# Patient Record
Sex: FEMALE | Race: BLACK OR AFRICAN AMERICAN | NOT HISPANIC OR LATINO | Employment: FULL TIME | URBAN - METROPOLITAN AREA
[De-identification: names, ages, dates, MRNs, and addresses within clinical notes are randomized per-mention and may not be internally consistent; named-entity substitution may affect disease eponyms.]

---

## 2017-03-10 ENCOUNTER — TELEPHONE (OUTPATIENT)
Dept: INTERNAL MEDICINE | Facility: CLINIC | Age: 27
End: 2017-03-10

## 2017-03-10 ENCOUNTER — TELEPHONE (OUTPATIENT)
Dept: OBGYN | Facility: CLINIC | Age: 27
End: 2017-03-10

## 2017-03-10 DIAGNOSIS — N63.0 LUMP OR MASS IN BREAST: Primary | ICD-10-CM

## 2017-03-10 NOTE — TELEPHONE ENCOUNTER
Pt calling in as she has an increased size of lump on left breast.  In 2015 this same lump was looked at, she didn't seem to know the results    She's wondering her next steps.  To have an biopsy ?  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  Biopsy up to the Radiologist.    Pt has not been seen since 5/9/2016 by Dr. Cali.  When discussing whom to see, she chose Dr. Ivan  I explained that Dr. Ivan may want to see pt prior to witting any orders.    Orders pending  Please advise  Brayan RIOS RN

## 2017-03-15 NOTE — TELEPHONE ENCOUNTER
Pt called and advised, phone number given so she can call and schedule (she said they did try calling her, but she missed their call).

## 2017-03-16 ENCOUNTER — HOSPITAL ENCOUNTER (OUTPATIENT)
Dept: ULTRASOUND IMAGING | Facility: CLINIC | Age: 27
Discharge: HOME | End: 2017-03-16
Attending: INTERNAL MEDICINE | Admitting: INTERNAL MEDICINE
Payer: COMMERCIAL

## 2017-03-16 DIAGNOSIS — N63.0 LUMP OR MASS IN BREAST: ICD-10-CM

## 2017-03-16 PROCEDURE — 76642 ULTRASOUND BREAST LIMITED: CPT | Mod: LT

## 2017-03-21 ENCOUNTER — HOSPITAL ENCOUNTER (OUTPATIENT)
Dept: ULTRASOUND IMAGING | Facility: CLINIC | Age: 27
Discharge: HOME | End: 2017-03-21
Attending: INTERNAL MEDICINE | Admitting: INTERNAL MEDICINE
Payer: COMMERCIAL

## 2017-03-21 DIAGNOSIS — N63.0 BREAST MASS: ICD-10-CM

## 2017-03-21 PROCEDURE — 27210206 US BREAST BIOPSY CORE NEEDLE LEFT

## 2017-03-21 PROCEDURE — 88305 TISSUE EXAM BY PATHOLOGIST: CPT | Mod: 26 | Performed by: RADIOLOGY

## 2017-03-21 PROCEDURE — 88305 TISSUE EXAM BY PATHOLOGIST: CPT | Performed by: RADIOLOGY

## 2017-03-21 NOTE — DISCHARGE INSTRUCTIONS
Page 1 of 1  For informational purposes only. Not to replace the advice of your health care provider. Copyright   2010 [facility]. All rights reserved. [employer] [id number] - REV 02/16.  After Your Breast Biopsy   Bleeding or bruising  Slight bruising is normal. If you bleed through the bandage, put direct pressure on the breast for 10 minutes.   If the breast begins to swell, or you have a lot of bleeding after 10 minutes of pressure, call the doctor who ordered your exam. Or, go to the emergency room.   Bandages  Keep your bandage in place until tomorrow morning. Do not get it wet.   If you have small pieces of tape on the skin, leave them in place. They will fall off on their own, or you can remove them after 5 days.   Activity  You may shower the morning after the exam. No heavy activity (lifting, vacuuming) on the day of your exam. You may go back to normal activity the next day, unless you had a lot of bleeding or pain.  Discomfort  You may take Tylenol (acetaminophen) today for pain. Tomorrow, you may take an anti-inflammatory medicine (aspirin, ibuprofen, Motrin, Aleve, Advil), unless your doctor tells you not to.  Wear your bra overnight to support the breast. You may also use an ice pack: Place it over the area for 15-20 minutes several times a day.  Infection  Infection is rare. Symptoms include fever, redness, increasing pain and fluid draining from the biopsy site. If you have any of these symptoms, please call the doctor who ordered your exam.  Results  Results may take up to 5 business days. A nurse or doctor from the [facility] will call with your results. We will also send the results to the doctor who ordered your biopsy.  If you have not heard your results in 5 days, please call the Breast Center.   Other instructions  ______________________________________________________________________________________________________________________________  Call your doctor if:    You have  bleeding that lasts more than 10 minutes.    You have pain that cannot be controlled.   You have signs of infection (fever, redness, drainage or other signs).   You have not received your results within 5 days.    Please call the [facility] Center nurse navigator at [phone] if you have questions or concerns about your biopsy.

## 2017-03-22 ENCOUNTER — TELEPHONE (OUTPATIENT)
Dept: MAMMOGRAPHY | Facility: CLINIC | Age: 27
End: 2017-03-22

## 2017-03-22 LAB — COPATH REPORT: NORMAL

## 2017-03-22 NOTE — TELEPHONE ENCOUNTER
Pathology report reviewed with breast radiologist Felipe. I phoned and informed patient of results showing benign fibroadenoma. Recommended follow up is routine screening at age 40.   Patient states no problems with biopsy site. Questions were answered and my phone number given if she has further questions or concerns.

## 2017-08-02 ENCOUNTER — HOSPITAL ENCOUNTER (EMERGENCY)
Facility: CLINIC | Age: 27
Discharge: HOME | End: 2017-08-02
Attending: EMERGENCY MEDICINE | Admitting: EMERGENCY MEDICINE
Payer: COMMERCIAL

## 2017-08-02 VITALS
HEART RATE: 79 BPM | SYSTOLIC BLOOD PRESSURE: 137 MMHG | DIASTOLIC BLOOD PRESSURE: 95 MMHG | TEMPERATURE: 97.2 F | RESPIRATION RATE: 18 BRPM | OXYGEN SATURATION: 99 %

## 2017-08-02 DIAGNOSIS — J02.9 ACUTE VIRAL PHARYNGITIS: ICD-10-CM

## 2017-08-02 LAB
DEPRECATED S PYO AG THROAT QL EIA: NORMAL
MICRO REPORT STATUS: NORMAL
SPECIMEN SOURCE: NORMAL

## 2017-08-02 PROCEDURE — 87880 STREP A ASSAY W/OPTIC: CPT | Performed by: EMERGENCY MEDICINE

## 2017-08-02 PROCEDURE — 99283 EMERGENCY DEPT VISIT LOW MDM: CPT

## 2017-08-02 PROCEDURE — 87081 CULTURE SCREEN ONLY: CPT | Performed by: EMERGENCY MEDICINE

## 2017-08-02 ASSESSMENT — ENCOUNTER SYMPTOMS
FEVER: 0
SORE THROAT: 1
COUGH: 1

## 2017-08-02 NOTE — ED AVS SNAPSHOT
[facility] Emergency Department    [address] [place] [address]    Phone:  [phone]    Fax:  [phone]                                       Yael Johnson   MRN: [id number]    Department:  [facility] Emergency Department   Date of Visit:  8/2/2017           Patient Information     Date Of Birth          1990        Your diagnoses for this visit were:     Acute viral pharyngitis        You were seen by Arnoldo Payne MD.      Follow-up Information     Follow up with PCP as needed.        Discharge Instructions         Viral Pharyngitis (Sore Throat)    You (or your child, if your child is the patient) have pharyngitis (sore throat). This infection is caused by a virus. It can cause throat pain that is worse when swallowing, aching all over, headache, and fever. The infection may be spread by coughing, kissing, or touching others after touching your mouth or nose. Antibiotic medications do not work against viruses, so they are not used for treating this condition.  Home care    If your symptoms are severe, rest at home. Return to work or school when you feel well enough.     Drink plenty of fluids to avoid dehydration.    For children: Use acetaminophen for fever, fussiness or discomfort. In infants over six months of age, you may use ibuprofen instead of acetaminophen. (NOTE: If your child has chronic liver or kidney disease or ever had a stomach ulcer or GI bleeding, talk with your doctor before using these medicines.) (NOTE: Aspirin should never be used in anyone under 18 years of age who is ill with a fever. It may cause severe liver damage.)     For adults: You may use acetaminophen or ibuprofen to control pain or fever, unless another medicine was prescribed for this. (NOTE: If you have chronic liver or kidney disease or ever had a stomach ulcer or GI bleeding, talk with your doctor before using these medicines.)    Throat lozenges or numbing throat  sprays can help reduce pain. Gargling with warm salt water will also help reduce throat pain. For this, dissolve 1/2 teaspoon of salt in 1 glass of warm water. To help soothe a sore throat, children can sip on juice or a popsicle. Children 5 years and older can also suck on a lollipop or hard candy.    Avoid salty or spicy foods, which can be irritating to the throat.  Follow-up care  Follow up with your healthcare provider or our staff if you are not improving over the next week.  When to seek medical advice  Call your healthcare provider right away if any of these occur:    Fever as directed by your doctor.  For children, seek care if:    Your child is of any age and has repeated fevers above 104 F (40 C).    Your child is younger than 2 years of age and has a fever of 100.4 F (38 C) that continues for more than 1 day.    Your child is 2 years old or older and has a fever of 100.4 F (38 C) that continues for more than 3 days.    New or worsening ear pain, sinus pain, or headache    Painful lumps in the back of neck    Stiff neck    Lymph nodes are getting larger    Inability to swallow liquids, excessive drooling, or inability to open mouth wide due to throat pain    Signs of dehydration (very dark urine or no urine, sunken eyes, dizziness)    Trouble breathing or noisy breathing    Muffled voice    New rash    Child appears to be getting sicker  Date Last Reviewed: 4/13/2015 2000-2017 [employer]. [address] [place] [address]. All rights reserved. This information is not intended as a substitute for professional medical care. Always follow your healthcare professional's instructions.          24 Hour Appointment Hotline       To make an appointment at any [facility] clinic, call [phone] ([phone]). If you don't have a family doctor or clinic, we will help you find one. [facility] clinics are conveniently located to serve the needs of you and your family.             Review  of your medicines      Our records show that you are taking the medicines listed below. If these are incorrect, please call your family doctor or clinic.        Dose / Directions Last dose taken    albuterol (2.5 MG/3ML) 0.083% neb solution   Dose:  1 vial   Quantity:  1 Box        Take 1 vial (2.5 mg) by nebulization every 6 hours as needed for shortness of breath / dyspnea   Refills:  11        fluticasone-salmeterol 100-50 MCG/DOSE diskus inhaler   Commonly known as:  ADVAIR DISKUS   Dose:  1 puff   Quantity:  1 Inhaler        Inhale 1 puff into the lungs every 12 hours   Refills:  0                Procedures and tests performed during your visit     Beta strep group A culture    Rapid strep screen      Orders Needing Specimen Collection     None      Pending Results     Date and Time Order Name Status Description    8/2/2017 0612 Beta strep group A culture In process             Pending Culture Results     Date and Time Order Name Status Description    8/2/2017 0612 Beta strep group A culture In process             Pending Results Instructions     If you had any lab results that were not finalized at the time of your Discharge, you can call the ED Lab Result RN at [phone]. You will be contacted by this team for any positive Lab results or changes in treatment. The nurses are available 7 days a week from 10A to 6:30P.  You can leave a message 24 hours per day and they will return your call.        Test Results From Your Hospital Stay        8/2/2017  6:43 AM      Component Results     Component    Specimen Description    Throat    Rapid Strep A Screen    NEGATIVE: No Group A streptococcal antigen detected by immunoassay, await   culture report.      Micro Report Status    FINAL 08/02/2017 8/2/2017  6:44 AM                Clinical Quality Measure: Blood Pressure Screening     Your blood pressure was checked while you were in the emergency department today. The last reading we obtained was  BP: (!)  "137/95 . Please read the guidelines below about what these numbers mean and what you should do about them.  If your systolic blood pressure (the top number) is less than 120 and your diastolic blood pressure (the bottom number) is less than 80, then your blood pressure is normal. There is nothing more that you need to do about it.  If your systolic blood pressure (the top number) is 120-139 or your diastolic blood pressure (the bottom number) is 80-89, your blood pressure may be higher than it should be. You should have your blood pressure rechecked within a year by a primary care provider.  If your systolic blood pressure (the top number) is 140 or greater or your diastolic blood pressure (the bottom number) is 90 or greater, you may have high blood pressure. High blood pressure is treatable, but if left untreated over time it can put you at risk for heart attack, stroke, or kidney failure. You should have your blood pressure rechecked by a primary care provider within the next 4 weeks.  If your provider in the emergency department today gave you specific instructions to follow-up with your doctor or provider even sooner than that, you should follow that instruction and not wait for up to 4 weeks for your follow-up visit.        Thank you for choosing [facility]       Thank you for choosing [facility] for your care. Our goal is always to provide you with excellent care. Hearing back from our patients is one way we can continue to improve our services. Please take a few minutes to complete the written survey that you may receive in the mail after you visit with us. Thank you!        [employer] Information     [employer] lets you send messages to your doctor, view your test results, renew your prescriptions, schedule appointments and more. To sign up, go to [email or web address] . Click on \"Log in\" on the left side of the screen, which will take you to the Welcome page. Then click on \"Sign up Now\" on the right side of the " page.     You will be asked to enter the access code listed below, as well as some personal information. Please follow the directions to create your username and password.     Your access code is: [email or web address]  Expires: 10/31/2017  6:45 AM     Your access code will  in 90 days. If you need help or a new code, please call your [facility] clinic or [phone].        Care EveryWhere ID     This is your Care EveryWhere ID. This could be used by other organizations to access your [facility] medical records  [id number]        Equal Access to Services     [facility] : Anshu Kline, jermaine goodson, tremaine shepard, demetra lucio . So [facility] [phone].    ATENCIÓN: Si habla español, tiene a moran disposición servicios gratuitos de asistencia lingüística. Llame al [phone].    We comply with applicable federal civil rights laws and [place] laws. We do not discriminate on the basis of race, color, national origin, age, disability sex, sexual orientation or gender identity.            After Visit Summary       This is your record. Keep this with you and show to your community pharmacist(s) and doctor(s) at your next visit.

## 2017-08-02 NOTE — ED AVS SNAPSHOT
[facility] Emergency Department    [address]    [facility] [address]    Phone:  [phone]    Fax:  [phone]                                       Yael Johnson   MRN: [id number]    Department:  [facility] Emergency Department   Date of Visit:  8/2/2017           After Visit Summary Signature Page     I have received my discharge instructions, and my questions have been answered. I have discussed any challenges I see with this plan with the nurse or doctor.    ..........................................................................................................................................  Patient/Patient Representative Signature      ..........................................................................................................................................  Patient Representative Print Name and Relationship to Patient    ..................................................               ................................................  Date                                            Time    ..........................................................................................................................................  Reviewed by Signature/Title    ...................................................              ..............................................  Date                                                            Time

## 2017-08-02 NOTE — LETTER
To Whom it may concern:      Yael Johnson was seen in our Emergency Department today, 08/02/17.  I expect her condition to improve over the next  day.  she may return to work/school when improved.    Sincerely,  Wendi Perry RN

## 2017-08-02 NOTE — ED PROVIDER NOTES
History     Chief Complaint:  Sore throat    HPI   Yael Johnson is a 26 year old female with history of asthma, who presents with sore throat. She woke up with a sore throat and odynophagia, which improved after taking a shower. It is now 5/10 in intensity. No otalgia or subjective fever. She reports having cough and sneezing over the past few days. She has not taken any ibuprofen / tylenol.    Allergies:  The patient has no known drug allergies.      Medications:    Advair Diskus  Albuterol neb    Past Medical History:    Moderate asthma  Tonsil hypertrophy     Past Surgical History:    History reviewed.  No significant past surgical history.      Family History:    Lung disease  HTN  DM type 2  Arthritis     Social History:  Marital Status:  Single   Smoking status: negative   Alcohol status: occasional   Patient presents alone.     Review of Systems   Constitutional: Negative for fever.   HENT: Positive for sore throat. Negative for ear pain.    Respiratory: Positive for cough.    All other systems reviewed and are negative.      Physical Exam   First Vitals:  BP: 137/95  Heart Rate: 79  Temp: 97.2  F (36.2  C)  Resp: 18  SpO2: 99 %      Physical Exam  Nursing note and vitals reviewed.  Constitutional: Cooperative. Sitting up comfortably in a chair.   HENT:   Mouth/Throat: Mucous membranes are normal. Mild erythema to the posterior oropharynx. No abscess or exudate. TM'S normal bilaterally.  Cardiovascular: Normal rate, regular rhythm and normal heart sounds.  No murmur.  Pulmonary/Chest: Effort normal and breath sounds normal. No respiratory distress. No wheezes. No rales.   Neurological: Alert.   Skin: Skin is warm and dry.  Psychiatric: Normal mood and affect.      Emergency Department Course     Laboratory:  Laboratory findings were communicated with the patient who voiced understanding of the findings.    Rapid strep screen: Negative  Beta strep group A culture: Pending     Emergency Department  Course:  Nursing notes and vitals reviewed.  I performed an exam of the patient as documented above.     Swab sample obtained for strep testing, results above.     I discussed the findings and treatment plan with the patient. She expressed understanding of this plan and consented to discharge. She will be discharged home with instructions for care and follow up. In addition, the patient will return to the emergency department if their symptoms persist, worsen, if new symptoms arise or if there is any concern.  All questions were answered.     Impression & Plan      Medical Decision Making:  Yael Johnson is a 26 year old female who presents for evaluation of a sore throat and clinical evidence of pharyngitis.  The rapid strep test is negative, and formal culture has been set up in the laboratory. There is no clinical evidence of peritonsillar abscess, retropharyngeal abscess, Lemierre's Syndrome, epiglottis, or Misael's angina. The etiology is most likely viral. I have recommended treatment with analgesics, and we will await formal culture results.  If the culture is positive, an ED physician will call the patient to initiate anti-microbial therapy. Return if increasing pain, change in voice, neck pain, vomiting, fever, or shortness of breath. Follow-up with primary physician if not improving in 3-5 days. Given well appearance, I would not test further for other etiologies of serious bacterial infections.   Patient has a concurrent cough and congestion, making viral etiologies more likely.      Diagnosis:    ICD-10-CM   1. Acute viral pharyngitis J02.8    B97.89       Disposition:   Discharge     Scribe Disclosure:  Cathy MARC, am serving as a scribe at 6:27 AM on 8/2/2017 to document services personally performed by Arnoldo Payne MD, based on my observations and the provider's statements to me.    [facility] EMERGENCY DEPARTMENT       Arnoldo Payne MD  08/02/17 0623

## 2017-08-02 NOTE — DISCHARGE INSTRUCTIONS
Viral Pharyngitis (Sore Throat)    You (or your child, if your child is the patient) have pharyngitis (sore throat). This infection is caused by a virus. It can cause throat pain that is worse when swallowing, aching all over, headache, and fever. The infection may be spread by coughing, kissing, or touching others after touching your mouth or nose. Antibiotic medications do not work against viruses, so they are not used for treating this condition.  Home care    If your symptoms are severe, rest at home. Return to work or school when you feel well enough.     Drink plenty of fluids to avoid dehydration.    For children: Use acetaminophen for fever, fussiness or discomfort. In infants over six months of age, you may use ibuprofen instead of acetaminophen. (NOTE: If your child has chronic liver or kidney disease or ever had a stomach ulcer or GI bleeding, talk with your doctor before using these medicines.) (NOTE: Aspirin should never be used in anyone under 18 years of age who is ill with a fever. It may cause severe liver damage.)     For adults: You may use acetaminophen or ibuprofen to control pain or fever, unless another medicine was prescribed for this. (NOTE: If you have chronic liver or kidney disease or ever had a stomach ulcer or GI bleeding, talk with your doctor before using these medicines.)    Throat lozenges or numbing throat sprays can help reduce pain. Gargling with warm salt water will also help reduce throat pain. For this, dissolve 1/2 teaspoon of salt in 1 glass of warm water. To help soothe a sore throat, children can sip on juice or a popsicle. Children 5 years and older can also suck on a lollipop or hard candy.    Avoid salty or spicy foods, which can be irritating to the throat.  Follow-up care  Follow up with your healthcare provider or our staff if you are not improving over the next week.  When to seek medical advice  Call your healthcare provider right away if any of these  occur:    Fever as directed by your doctor.  For children, seek care if:    Your child is of any age and has repeated fevers above 104 F (40 C).    Your child is younger than 2 years of age and has a fever of 100.4 F (38 C) that continues for more than 1 day.    Your child is 2 years old or older and has a fever of 100.4 F (38 C) that continues for more than 3 days.    New or worsening ear pain, sinus pain, or headache    Painful lumps in the back of neck    Stiff neck    Lymph nodes are getting larger    Inability to swallow liquids, excessive drooling, or inability to open mouth wide due to throat pain    Signs of dehydration (very dark urine or no urine, sunken eyes, dizziness)    Trouble breathing or noisy breathing    Muffled voice    New rash    Child appears to be getting sicker  Date Last Reviewed: 4/13/2015 2000-2017 [employer]. [address] [place] [address], [place] [address]. All rights reserved. This information is not intended as a substitute for professional medical care. Always follow your healthcare professional's instructions.

## 2017-08-04 LAB
BACTERIA SPEC CULT: NORMAL
Lab: NORMAL
MICRO REPORT STATUS: NORMAL
SPECIMEN SOURCE: NORMAL

## 2017-10-16 ENCOUNTER — TELEPHONE (OUTPATIENT)
Dept: INTERNAL MEDICINE | Facility: CLINIC | Age: 27
End: 2017-10-16

## 2017-10-16 NOTE — TELEPHONE ENCOUNTER
Patient calls requesting an appointment fro enlarged tonsils, wants them removed. Advised appointment, scheduled at 1:20pm 10/19/17 which was earliest pt could work with her schedule.

## 2017-11-09 ENCOUNTER — TELEPHONE (OUTPATIENT)
Dept: INTERNAL MEDICINE | Facility: CLINIC | Age: 27
End: 2017-11-09

## 2017-11-09 DIAGNOSIS — J45.40 MODERATE PERSISTENT ASTHMA WITHOUT COMPLICATION: ICD-10-CM

## 2017-11-09 NOTE — TELEPHONE ENCOUNTER
Yael Johnson is a 27 year old female who calls with difficulty breathing.    NURSING ASSESSMENT:  Description:  Difficulty breathing  Onset/duration:  About 1 week  Precip. factors:  Out of Advair for about 10 days, needs refill, but no longer as a primary at our office (former pt of Dr. Cali)  Associated symptoms:  Wheezing, coughing  Improves/worsens symptoms:  Albuterol neb  Last exam/Treatment:  5/9/16  Allergies:   Allergies   Allergen Reactions     Cats      Dogs        MEDICATIONS:   Taking medication(s) as prescribed? No and ran out of Advair  Taking over the counter medication(s?) N/A  Any medication side effects? No significant side effects    Any barriers to taking medication(s) as prescribed?  No  Medication(s) improving/managing symptoms?  Yes  Medication reconciliation completed: No      NURSING PLAN: Nursing advice to patient will refill Advair, schedule appt to establish care with a new provider    RECOMMENDED DISPOSITION:  See within 2 weeks - appt scheduled with Anastacia Santana.   Next 5 appointments (look out 90 days)     Nov 14, 2017  2:00 PM CST   SHORT with Anastacia Santana NP   [facility] ([facility])    [address] [place] [id number] [phone]                 Will comply with recommendation: Yes  If further questions/concerns or if symptoms do not improve, worsen or new symptoms develop, call your PCP or [facility] as soon as possible.      Guideline used:  Telephone Triage Protocols for Nurses, Fourth Edition, Eloina Mccullough    Medication is being filled for 1 time refill only due to:  Patient needs to be seen because it has been more than one year since last visit. Future appt scheduled.     Ida Garner RN

## 2017-12-14 DIAGNOSIS — J45.40 MODERATE PERSISTENT ASTHMA WITHOUT COMPLICATION: ICD-10-CM

## 2017-12-18 NOTE — TELEPHONE ENCOUNTER
Requested Prescriptions   Pending Prescriptions Disp Refills     ADVAIR DISKUS 100-50 MCG/DOSE diskus inhaler [Pharmacy Med Name: ADVAIR 100-50 DISKUS]  0     Sig: INHALE 1 PUFF INTO THE LUNGS EVERY 12 HOURS    Inhaled Steroids Protocol Failed    12/14/2017  9:43 AM       Failed - Asthma control test 20 or greater in last 6 months    Please review ACT score.          Failed - Recent (6 mo) or future visit with authorizing provider's specialty    Patient had office visit in the last 6 months or has a visit in the next 30 days with authorizing provider.  See chart review.            Passed - Patient is age 12 or older        Routing refill request to provider for review/approval because:  Failed protocol, Patient informed 11/9/17 needed OV and F/u

## 2017-12-18 NOTE — TELEPHONE ENCOUNTER
[facility] left voice message checking on status of refill request.     Former patient of Dr. Cali' - last appt 5/9/16. She was given one time refill in Nov to last until appt with Anastacia Santana. Pt has no-showed last two appts in our office.     Left voice message for pharmacy that pt is due for office visit, needs to be seen for refills.     Attempted to contact pt. Left voice message to call back.

## 2018-05-03 ENCOUNTER — OFFICE VISIT (OUTPATIENT)
Dept: FAMILY MEDICINE | Facility: CLINIC | Age: 28
End: 2018-05-03
Payer: COMMERCIAL

## 2018-05-03 VITALS
OXYGEN SATURATION: 98 % | RESPIRATION RATE: 20 BRPM | TEMPERATURE: 96.9 F | SYSTOLIC BLOOD PRESSURE: 121 MMHG | HEART RATE: 86 BPM | DIASTOLIC BLOOD PRESSURE: 81 MMHG

## 2018-05-03 DIAGNOSIS — J45.40 MODERATE PERSISTENT ASTHMA WITHOUT COMPLICATION: ICD-10-CM

## 2018-05-03 DIAGNOSIS — Z23 NEED FOR VACCINATION: ICD-10-CM

## 2018-05-03 DIAGNOSIS — N76.0 BV (BACTERIAL VAGINOSIS): Primary | ICD-10-CM

## 2018-05-03 DIAGNOSIS — N89.8 VAGINAL DISCHARGE: ICD-10-CM

## 2018-05-03 DIAGNOSIS — B96.89 BV (BACTERIAL VAGINOSIS): Primary | ICD-10-CM

## 2018-05-03 LAB
SPECIMEN SOURCE: ABNORMAL
WET PREP SPEC: ABNORMAL
WET PREP SPEC: ABNORMAL

## 2018-05-03 PROCEDURE — 90471 IMMUNIZATION ADMIN: CPT | Performed by: PHYSICIAN ASSISTANT

## 2018-05-03 PROCEDURE — 90715 TDAP VACCINE 7 YRS/> IM: CPT | Performed by: PHYSICIAN ASSISTANT

## 2018-05-03 PROCEDURE — 99214 OFFICE O/P EST MOD 30 MIN: CPT | Mod: 25 | Performed by: PHYSICIAN ASSISTANT

## 2018-05-03 PROCEDURE — 87210 SMEAR WET MOUNT SALINE/INK: CPT | Performed by: PHYSICIAN ASSISTANT

## 2018-05-03 RX ORDER — METRONIDAZOLE 500 MG/1
500 TABLET ORAL 2 TIMES DAILY
Qty: 14 TABLET | Refills: 0 | Status: SHIPPED | OUTPATIENT
Start: 2018-05-03 | End: 2018-05-31

## 2018-05-03 NOTE — PATIENT INSTRUCTIONS
"Discussed importance of safe sex practices and condom use with patient.  Treatment: Flagyl 500 BID x 7 days  ROV prn if symptoms persist or worsen.  Vaginitis Prevention and Self-Care    Wear loose-fitting, cotton clothing. Stay away from nylon and synthetics, because they hold heat and moisture close to the skin, which makes it easier for an infection to start. You may want to remove pajama bottoms or underwear when you sleep.     Avoid sex while vaginal tissues are irritated to allow them to heal.     Consider using a water-based lubricant during sexual activity.    Do not scratch the vaginal area. Relieve itching with a cold water compress or cool baths. Warm baths may also relieve pain and itching.    You may use an over-the-counter hydrocortisone cream for relief of itching.  Do not use for more that 2 weeks at a time.    Do not douche, use scented soaps, sprays or other \"feminine products\" as these can irritate vaginal tissue.    Properly clean the genital area while bathing or showering. Proper wiping after using the toilet can also help (front to back).   Contact your care provider if:    Unexpected vaginal bleeding develops.     A fever develops.     You have moderate to severe pain.     Your symptoms become more severe or frequent.     "

## 2018-05-03 NOTE — PROGRESS NOTES
SUBJECTIVE:   Yael Johnosn is a 27 year old female who presents to clinic today for the following health issues:    Vaginal Symptoms      Duration: 1 week    Description  odor    Intensity:  none    Accompanying signs and symptoms (fever/dysuria/abdominal or back pain): None    History  Sexually active: not at present  Possibility of pregnancy: No  Recent antibiotic use: no     Therapies tried and outcome: none       Patient also wondering about her asthma - history of being on higher dose of her daily Advair with good results - feels like she would benefit from this increase again.      Problem list and histories reviewed & adjusted, as indicated.  Additional history: as documented    Patient Active Problem List   Diagnosis     CARDIOVASCULAR SCREENING; LDL GOAL LESS THAN 160     Family history of diabetes mellitus     Enlarged tonsils     Moderate persistent asthma     Past Surgical History:   Procedure Laterality Date     none         Social History   Substance Use Topics     Smoking status: Never Smoker     Smokeless tobacco: Never Used     Alcohol use Yes      Comment: OCC     Family History   Problem Relation Age of Onset     Hypertension Mother      DIABETES Mother      type 2     Arthritis Mother      DIABETES Maternal Grandmother      type 2     Cardiovascular Paternal Grandfather      Lung         Current Outpatient Prescriptions   Medication Sig Dispense Refill     albuterol (2.5 MG/3ML) 0.083% nebulizer solution Take 1 vial (2.5 mg) by nebulization every 6 hours as needed for shortness of breath / dyspnea 1 Box 11     fluticasone-salmeterol (ADVAIR) 250-50 MCG/DOSE diskus inhaler Inhale 1 puff into the lungs 2 times daily 3 Inhaler 1     metroNIDAZOLE (FLAGYL) 500 MG tablet Take 1 tablet (500 mg) by mouth 2 times daily 14 tablet 0     [DISCONTINUED] ADVAIR DISKUS 100-50 MCG/DOSE diskus inhaler INHALE 1 PUFF INTO THE LUNGS EVERY 12 HOURS 1 Inhaler 11     Allergies   Allergen Reactions     Cats       Dogs        Reviewed and updated as needed this visit by clinical staff  Tobacco  Allergies  Meds  Problems  Med Hx  Surg Hx  Fam Hx  Soc Hx        Reviewed and updated as needed this visit by Provider  Allergies  Meds  Problems         ROS:  Constitutional, HEENT, cardiovascular, pulmonary, GI, , musculoskeletal, neuro, skin, endocrine and psych systems are negative, except as otherwise noted.    OBJECTIVE:     /81 (BP Location: Left arm, Patient Position: Sitting, Cuff Size: Adult Large)  Pulse 86  Temp 96.9  F (36.1  C) (Tympanic)  Resp 20  SpO2 98%  There is no height or weight on file to calculate BMI.  GENERAL: healthy, alert and no distress  RESP: lungs clear to auscultation - no rales, rhonchi or wheezes  CV: regular rate and rhythm, normal S1 S2, no S3 or S4, no murmur, click or rub, no peripheral edema and peripheral pulses strong  : self wet prep done  PSYCH: mentation appears normal, affect normal/bright    Diagnostic Test Results:  Results for orders placed or performed in visit on 05/03/18   Wet prep   Result Value Ref Range    Specimen Description Vagina     Wet Prep Clue cells seen  No Trichomonas seen   (A)     Wet Prep No yeast seen         ASSESSMENT/PLAN:       ICD-10-CM    1. BV (bacterial vaginosis) N76.0 metroNIDAZOLE (FLAGYL) 500 MG tablet    B96.89    2. Vaginal discharge N89.8 Wet prep   3. Moderate persistent asthma without complication J45.40 fluticasone-salmeterol (ADVAIR) 250-50 MCG/DOSE diskus inhaler   4. Need for vaccination Z23 TDAP VACCINE (ADACEL)     ADMIN 1st VACCINE       Patient Instructions   Discussed importance of safe sex practices and condom use with patient.  Treatment: Flagyl 500 BID x 7 days  ROV prn if symptoms persist or worsen.  Vaginitis Prevention and Self-Care    Wear loose-fitting, cotton clothing. Stay away from nylon and synthetics, because they hold heat and moisture close to the skin, which makes it easier for an infection to start.  "You may want to remove pajama bottoms or underwear when you sleep.     Avoid sex while vaginal tissues are irritated to allow them to heal.     Consider using a water-based lubricant during sexual activity.    Do not scratch the vaginal area. Relieve itching with a cold water compress or cool baths. Warm baths may also relieve pain and itching.    You may use an over-the-counter hydrocortisone cream for relief of itching.  Do not use for more that 2 weeks at a time.    Do not douche, use scented soaps, sprays or other \"feminine products\" as these can irritate vaginal tissue.    Properly clean the genital area while bathing or showering. Proper wiping after using the toilet can also help (front to back).   Contact your care provider if:    Unexpected vaginal bleeding develops.     A fever develops.     You have moderate to severe pain.     Your symptoms become more severe or frequent.         Rebecca Jama PA-C  [facility]  "

## 2018-05-03 NOTE — MR AVS SNAPSHOT
"              After Visit Summary   5/3/2018    Yael Johnson    MRN: [id number]           Patient Information     Date Of Birth          1990        Visit Information        Provider Department      5/3/2018 2:20 PM Rebecca Jama PA-C [facility] [place]        Today's Diagnoses     BV (bacterial vaginosis)    -  1    Vaginal discharge        Moderate persistent asthma without complication        Need for vaccination          Care Instructions    Discussed importance of safe sex practices and condom use with patient.  Treatment: Flagyl 500 BID x 7 days  ROV prn if symptoms persist or worsen.  Vaginitis Prevention and Self-Care    Wear loose-fitting, cotton clothing. Stay away from nylon and synthetics, because they hold heat and moisture close to the skin, which makes it easier for an infection to start. You may want to remove pajama bottoms or underwear when you sleep.     Avoid sex while vaginal tissues are irritated to allow them to heal.     Consider using a water-based lubricant during sexual activity.    Do not scratch the vaginal area. Relieve itching with a cold water compress or cool baths. Warm baths may also relieve pain and itching.    You may use an over-the-counter hydrocortisone cream for relief of itching.  Do not use for more that 2 weeks at a time.    Do not douche, use scented soaps, sprays or other \"feminine products\" as these can irritate vaginal tissue.    Properly clean the genital area while bathing or showering. Proper wiping after using the toilet can also help (front to back).   Contact your care provider if:    Unexpected vaginal bleeding develops.     A fever develops.     You have moderate to severe pain.     Your symptoms become more severe or frequent.             Follow-ups after your visit        Follow-up notes from your care team     Return if symptoms worsen or fail to improve.      Who to contact     If you have questions or need follow up information " "about today's clinic visit or your schedule please contact [facility] directly at [phone].  Normal or non-critical lab and imaging results will be communicated to you by [employer], letter or phone within 4 business days after the clinic has received the results. If you do not hear from us within 7 days, please contact the clinic through [employer] or phone. If you have a critical or abnormal lab result, we will notify you by phone as soon as possible.  Submit refill requests through [employer] or call your pharmacy and they will forward the refill request to us. Please allow 3 business days for your refill to be completed.          Additional Information About Your Visit        [employer] Information     [employer] lets you send messages to your doctor, view your test results, renew your prescriptions, schedule appointments and more. To sign up, go to [email or web address] . Click on \"Log in\" on the left side of the screen, which will take you to the Welcome page. Then click on \"Sign up Now\" on the right side of the page.     You will be asked to enter the access code listed below, as well as some personal information. Please follow the directions to create your username and password.     Your access code is: [email or web address]  Expires: 2018  3:21 PM     Your access code will  in 90 days. If you need help or a new code, please call your [place] clinic or [phone].        Care EveryWhere ID     This is your Care EveryWhere ID. This could be used by other organizations to access your [place] medical records  [id number]        Your Vitals Were     Pulse Temperature Respirations Pulse Oximetry          86 96.9  F (36.1  C) (Tympanic) 20 98%         Blood Pressure from Last 3 Encounters:   18 121/81   17 (!) 137/95   10/07/16 122/68    Weight from Last 3 Encounters:   10/07/16 242 lb 4.8 oz (109.9 kg)   16 236 lb (107 kg)   12 203 lb 1.6 oz (92.1 kg)              We Performed " the Following     ADMIN 1st VACCINE     TDAP VACCINE (ADACEL)     Wet prep          Today's Medication Changes          These changes are accurate as of 5/3/18  3:21 PM.  If you have any questions, ask your nurse or doctor.               Start taking these medicines.        Dose/Directions    fluticasone-salmeterol 250-50 MCG/DOSE diskus inhaler   Commonly known as:  ADVAIR   Used for:  Moderate persistent asthma without complication   Replaces:  ADVAIR DISKUS 100-50 MCG/DOSE diskus inhaler   Started by:  Rebecca Jama PA-C        Dose:  1 puff   Inhale 1 puff into the lungs 2 times daily   Quantity:  3 Inhaler   Refills:  1       metroNIDAZOLE 500 MG tablet   Commonly known as:  FLAGYL   Used for:  BV (bacterial vaginosis)   Started by:  Rebecca Jama PA-C        Dose:  500 mg   Take 1 tablet (500 mg) by mouth 2 times daily   Quantity:  14 tablet   Refills:  0         Stop taking these medicines if you haven't already. Please contact your care team if you have questions.     ADVAIR DISKUS 100-50 MCG/DOSE diskus inhaler   Generic drug:  fluticasone-salmeterol   Replaced by:  fluticasone-salmeterol 250-50 MCG/DOSE diskus inhaler   Stopped by:  Rebecca Jama PA-C                Where to get your medicines      These medications were sent to [facility] [address]  [address], [facility] [address]     Phone:  [phone]     fluticasone-salmeterol 250-50 MCG/DOSE diskus inhaler    metroNIDAZOLE 500 MG tablet                Primary Care Provider Fax #    Physician No Ref-Primary [phone]       No address on file        Equal Access to Services     [facility] : Anshu Kline, waaxda luchristal, qaybta kaaljohn shepard, demetra joya. So [facility] [phone].    ATENCIÓN: Si habla español, tiene a moran disposición servicios gratuitos de asistencia lingüística. Llame al [phone].    We comply  with applicable federal civil rights laws and [place] laws. We do not discriminate on the basis of race, color, national origin, age, disability, sex, sexual orientation, or gender identity.            Thank you!     Thank you for choosing [facility]  for your care. Our goal is always to provide you with excellent care. Hearing back from our patients is one way we can continue to improve our services. Please take a few minutes to complete the written survey that you may receive in the mail after your visit with us. Thank you!             Your Updated Medication List - Protect others around you: Learn how to safely use, store and throw away your medicines at www.disposemymeds.org.          This list is accurate as of 5/3/18  3:21 PM.  Always use your most recent med list.                   Brand Name Dispense Instructions for use Diagnosis    albuterol (2.5 MG/3ML) 0.083% neb solution     1 Box    Take 1 vial (2.5 mg) by nebulization every 6 hours as needed for shortness of breath / dyspnea    Mild persistent asthma without complication       fluticasone-salmeterol 250-50 MCG/DOSE diskus inhaler    ADVAIR    3 Inhaler    Inhale 1 puff into the lungs 2 times daily    Moderate persistent asthma without complication       metroNIDAZOLE 500 MG tablet    FLAGYL    14 tablet    Take 1 tablet (500 mg) by mouth 2 times daily    BV (bacterial vaginosis)

## 2018-05-03 NOTE — NURSING NOTE
Screening Questionnaire for Adult Immunization     Are you sick today?   No    Do you have allergies to medications, food or any vaccine?   No    Have you ever had a serious reaction after receiving a vaccination?   No    Do you have a long-term health problem with heart disease, lung disease,  asthma, kidney disease, diabetes, anemia, metabolic or blood disease?   Yes    Do you have cancer, leukemia, AIDS, or any immune system problem?   No    Do you take cortisone, prednisone, other steroids, or anticancer drugs, or  have you had any x-ray (radiation) treatments?   Yes    Have you had a seizure, brain, or other nervous system problem?   No    During the past year, have you received a transfusion of blood or blood       products, or been given a medicine called immune (gamma) globulin?   No    For women: Are you pregnant or is there a chance you could become         pregnant during the next month?   No    Have you received any vaccinations in the past 4 weeks?   No     Immunization questionnaire was positive for at least one answer.  Notified PLosser.      MNVFC doesn't apply on this patient      Per orders of Plosser, injection of tdap given by Meena Cuevas. Patient instructed to remain in clinic for 20 minutes afterwards, and to report any adverse reaction to me immediately.    Prior to injection verified patient identity using patient's name and date of birth.         Screening performed by Meena Cuevas, CMA

## 2018-05-04 ASSESSMENT — ASTHMA QUESTIONNAIRES: ACT_TOTALSCORE: 17

## 2018-05-07 ENCOUNTER — TELEPHONE (OUTPATIENT)
Dept: FAMILY MEDICINE | Facility: CLINIC | Age: 28
End: 2018-05-07

## 2018-05-31 ENCOUNTER — OFFICE VISIT (OUTPATIENT)
Dept: FAMILY MEDICINE | Facility: CLINIC | Age: 28
End: 2018-05-31
Payer: COMMERCIAL

## 2018-05-31 VITALS
OXYGEN SATURATION: 100 % | BODY MASS INDEX: 40.4 KG/M2 | SYSTOLIC BLOOD PRESSURE: 124 MMHG | TEMPERATURE: 98.3 F | HEIGHT: 65 IN | DIASTOLIC BLOOD PRESSURE: 67 MMHG | WEIGHT: 242.5 LBS | HEART RATE: 78 BPM | RESPIRATION RATE: 17 BRPM

## 2018-05-31 DIAGNOSIS — Z11.3 SCREEN FOR STD (SEXUALLY TRANSMITTED DISEASE): ICD-10-CM

## 2018-05-31 DIAGNOSIS — J45.40 MODERATE PERSISTENT ASTHMA WITHOUT COMPLICATION: ICD-10-CM

## 2018-05-31 DIAGNOSIS — N76.0 BV (BACTERIAL VAGINOSIS): ICD-10-CM

## 2018-05-31 DIAGNOSIS — N89.8 VAGINAL ODOR: Primary | ICD-10-CM

## 2018-05-31 DIAGNOSIS — J35.1 ENLARGED TONSILS: ICD-10-CM

## 2018-05-31 DIAGNOSIS — M25.562 LEFT KNEE PAIN, UNSPECIFIED CHRONICITY: ICD-10-CM

## 2018-05-31 DIAGNOSIS — E66.01 MORBID OBESITY (H): ICD-10-CM

## 2018-05-31 DIAGNOSIS — B96.89 BV (BACTERIAL VAGINOSIS): ICD-10-CM

## 2018-05-31 DIAGNOSIS — Z83.3 FAMILY HISTORY OF DIABETES MELLITUS: ICD-10-CM

## 2018-05-31 LAB
BASOPHILS # BLD AUTO: 0 10E9/L (ref 0–0.2)
BASOPHILS NFR BLD AUTO: 0.2 %
DIFFERENTIAL METHOD BLD: NORMAL
EOSINOPHIL # BLD AUTO: 0.3 10E9/L (ref 0–0.7)
EOSINOPHIL NFR BLD AUTO: 3.7 %
ERYTHROCYTE [DISTWIDTH] IN BLOOD BY AUTOMATED COUNT: 14.4 % (ref 10–15)
HBA1C MFR BLD: 5.5 % (ref 0–5.6)
HCT VFR BLD AUTO: 39.4 % (ref 35–47)
HGB BLD-MCNC: 13.1 G/DL (ref 11.7–15.7)
LYMPHOCYTES # BLD AUTO: 2.1 10E9/L (ref 0.8–5.3)
LYMPHOCYTES NFR BLD AUTO: 23.9 %
Lab: ABNORMAL
MCH RBC QN AUTO: 27 PG (ref 26.5–33)
MCHC RBC AUTO-ENTMCNC: 33.2 G/DL (ref 31.5–36.5)
MCV RBC AUTO: 81 FL (ref 78–100)
MONOCYTES # BLD AUTO: 0.6 10E9/L (ref 0–1.3)
MONOCYTES NFR BLD AUTO: 6.9 %
NEUTROPHILS # BLD AUTO: 5.7 10E9/L (ref 1.6–8.3)
NEUTROPHILS NFR BLD AUTO: 65.3 %
PLATELET # BLD AUTO: 199 10E9/L (ref 150–450)
RBC # BLD AUTO: 4.86 10E12/L (ref 3.8–5.2)
SPECIMEN SOURCE: ABNORMAL
WBC # BLD AUTO: 8.7 10E9/L (ref 4–11)
WET PREP SPEC: ABNORMAL

## 2018-05-31 PROCEDURE — 87389 HIV-1 AG W/HIV-1&-2 AB AG IA: CPT | Performed by: FAMILY MEDICINE

## 2018-05-31 PROCEDURE — 83036 HEMOGLOBIN GLYCOSYLATED A1C: CPT | Performed by: FAMILY MEDICINE

## 2018-05-31 PROCEDURE — 86803 HEPATITIS C AB TEST: CPT | Performed by: FAMILY MEDICINE

## 2018-05-31 PROCEDURE — 84443 ASSAY THYROID STIM HORMONE: CPT | Performed by: FAMILY MEDICINE

## 2018-05-31 PROCEDURE — 87210 SMEAR WET MOUNT SALINE/INK: CPT | Performed by: FAMILY MEDICINE

## 2018-05-31 PROCEDURE — 87591 N.GONORRHOEAE DNA AMP PROB: CPT | Performed by: FAMILY MEDICINE

## 2018-05-31 PROCEDURE — 87340 HEPATITIS B SURFACE AG IA: CPT | Performed by: FAMILY MEDICINE

## 2018-05-31 PROCEDURE — 86706 HEP B SURFACE ANTIBODY: CPT | Performed by: FAMILY MEDICINE

## 2018-05-31 PROCEDURE — 99214 OFFICE O/P EST MOD 30 MIN: CPT | Performed by: FAMILY MEDICINE

## 2018-05-31 PROCEDURE — 87491 CHLMYD TRACH DNA AMP PROBE: CPT | Performed by: FAMILY MEDICINE

## 2018-05-31 PROCEDURE — 80061 LIPID PANEL: CPT | Performed by: FAMILY MEDICINE

## 2018-05-31 PROCEDURE — 86780 TREPONEMA PALLIDUM: CPT | Performed by: FAMILY MEDICINE

## 2018-05-31 PROCEDURE — 80053 COMPREHEN METABOLIC PANEL: CPT | Performed by: FAMILY MEDICINE

## 2018-05-31 PROCEDURE — 36415 COLL VENOUS BLD VENIPUNCTURE: CPT | Performed by: FAMILY MEDICINE

## 2018-05-31 PROCEDURE — 85025 COMPLETE CBC W/AUTO DIFF WBC: CPT | Performed by: FAMILY MEDICINE

## 2018-05-31 RX ORDER — METRONIDAZOLE 7.5 MG/G
1 GEL VAGINAL AT BEDTIME
Qty: 70 G | Refills: 0 | Status: SHIPPED | OUTPATIENT
Start: 2018-05-31 | End: 2018-06-05

## 2018-05-31 RX ORDER — ALBUTEROL SULFATE 90 UG/1
2 AEROSOL, METERED RESPIRATORY (INHALATION) EVERY 6 HOURS PRN
Qty: 1 INHALER | Refills: 1 | Status: SHIPPED | OUTPATIENT
Start: 2018-05-31 | End: 2020-11-11

## 2018-05-31 NOTE — PROGRESS NOTES
SUBJECTIVE:   Yael Johnson is a 27 year old female who presents to clinic today for the following health issues:      Vaginal Symptoms        Duration: >1 week     Description  Odor unusual than normal  No BV a lot of times, may be twice now  Symptoms never cleared up  since 5/3/18  Has an odor like cleaning supplies  In bathroom wipes seat with something but smell is also on her underwear so doesnt think from wiping the seat    Intensity:  none    Accompanying signs and symptoms (fever/dysuria/abdominal or back pain): None    History  Sexually active: not at present, last in dec 2017  Possibility of pregnancy: No  Recent antibiotic use: yes , flagyl 5/3/18 for 7 days for BV    Therapies tried and outcome: none        Hx of morbid obesity, enlarged tonsils, moderate persistent asthma on albuterol neb, inhaler and Advair, FH of diabetes, allergic to cats and dogs, seen by [facility] 5/3/18 given flagyl 1 week for BV and Advair dose increased and given Tdap. Here as symptoms persisted despite treatment. Has had No itching or burning.     No sex since dec 2017       LMP: uses tori and usually when tori says will start gets it so pretty regular LMP 5/19/18    Asthma improved, ACT =24 still on lower dose Advair , will have 20 doses of that then go on to higher dose, asked for higher dose at last visit felt getting wheezy more often on lower dose but improved on her own on lower dose but would still like to continue being on the higher dose and plans to start that by end of the month. Does not have a rescue inhaler though has a albuterol for nebs at home. Finishing up last Advair, not started higher one yet, ACT,improved. Needs rescue inhaler to have on hand. Asthma action plan given.     Enlarged tonsils, lives with family. No snoring? Not loudly any way. No apnea episodes. No recent recurrent throat infections this past year.    Left knee sometimes hurts after sitting long, sharp like pain on extending leg after  sitting a while. Occurring one months, no fall or injury, no swelling or redness or warmth. ? Locking, no buckling. No change with going up or down stairs.     No fever or chills, no headache or dizziness, no double or blurry vision, no facial pain, earache, sore throat, runny nose, post nasal drip, no trouble hearing, smelling, tasting or swallowing, no cough , no chest pain, trouble breathing or palpitations, No abdominal pain, heart burn, reflux, nausea or vomiting or diarrhea or constipation, no blood in stools or black stools, no weight loss or night sweats. No dysuria, hematuria, frequency, urgency, hesitancy, incontinence, No rash.    Problem list and histories reviewed & adjusted, as indicated.  Additional history: as documented    Patient Active Problem List   Diagnosis     Family history of diabetes mellitus     Enlarged tonsils     Moderate persistent asthma     Morbid obesity (H)     Past Surgical History:   Procedure Laterality Date     US BREAST BIOPSY LT  2017    benign, still has a lump       Social History   Substance Use Topics     Smoking status: Never Smoker     Smokeless tobacco: Never Used     Alcohol use Yes      Comment: OCC     Family History   Problem Relation Age of Onset     Hypertension Mother      DIABETES Mother      type 2     Arthritis Mother      DIABETES Maternal Grandmother      type 2     Cardiovascular Paternal Grandfather      Lung         Current Outpatient Prescriptions   Medication Sig Dispense Refill     albuterol (2.5 MG/3ML) 0.083% nebulizer solution Take 1 vial (2.5 mg) by nebulization every 6 hours as needed for shortness of breath / dyspnea 1 Box 11     albuterol (PROAIR HFA/PROVENTIL HFA/VENTOLIN HFA) 108 (90 Base) MCG/ACT Inhaler Inhale 2 puffs into the lungs every 6 hours as needed for shortness of breath / dyspnea or wheezing 1 Inhaler 1     fluticasone-salmeterol (ADVAIR) 250-50 MCG/DOSE diskus inhaler Inhale 1 puff into the lungs 2 times daily 3 Inhaler 1      "metroNIDAZOLE (METROGEL) 0.75 % vaginal gel Place 1 applicator (5 g) vaginally At Bedtime for 5 days 70 g 0     Allergies   Allergen Reactions     Cats      Dogs      Recent Labs   Lab Test  05/31/18   1458  10/07/16   1449  05/09/16   1606  04/20/13   2125  05/16/11   1327   A1C  5.5   --    --    --    --    LDL   --    --   70   --    --    HDL   --    --   30*   --    --    TRIG   --    --   69   --    --    ALT   --    --   20  24  12   CR   --    --   1.02  0.71   --    GFRESTIMATED   --    --   67  >90   --    GFRESTBLACK   --    --   81  >90   --    POTASSIUM   --    --   3.8  3.8   --    TSH   --   0.98  0.82   --   0.75      BP Readings from Last 3 Encounters:   05/31/18 124/67   05/03/18 121/81   08/02/17 (!) 137/95    Wt Readings from Last 3 Encounters:   05/31/18 242 lb 8 oz (110 kg)   10/07/16 242 lb 4.8 oz (109.9 kg)   05/09/16 236 lb (107 kg)                  Labs reviewed in EPIC    Reviewed and updated as needed this visit by clinical staff       Reviewed and updated as needed this visit by Provider         ROS:  Constitutional, HEENT, cardiovascular, pulmonary, GI, , musculoskeletal, neuro, skin, endocrine and psych systems are negative, except as otherwise noted.    OBJECTIVE:     /67 (BP Location: Right arm, Patient Position: Chair, Cuff Size: Adult Large)  Pulse 78  Temp 98.3  F (36.8  C) (Oral)  Resp 17  Ht 5' 4.5\" (1.638 m)  Wt 242 lb 8 oz (110 kg)  LMP 05/19/2018  SpO2 100%  BMI 40.98 kg/m2  Body mass index is 40.98 kg/(m^2).  GENERAL: healthy, alert and no distress  EYES: Eyes grossly normal to inspection, PERRL and conjunctivae and sclerae normal  HENT: ear canals and TM's normal, nose and mouth without ulcers or lesions  NECK: no adenopathy, no asymmetry, masses, or scars and thyroid normal to palpation  RESP: lungs clear to auscultation - no rales, rhonchi or wheezes  CV: regular rate and rhythm, normal S1 S2, no S3 or S4, no murmur, click or rub, no peripheral edema and " peripheral pulses strong  ABDOMEN: soft, non tender, no hepatosplenomegaly, no masses and bowel sounds normal  MS: no gross musculoskeletal defects noted, no edema  SKIN: no suspicious lesions or rashes  NEURO: Normal strength and tone, mentation intact and speech normal  PSYCH: mentation appears normal, affect normal/bright    Diagnostic Test Results:  Results for orders placed or performed in visit on 05/31/18 (from the past 24 hour(s))   Wet prep   Result Value Ref Range    Specimen Description Vagina     Special Requests Vagina     Wet Prep Clue cells seen (A)     Wet Prep No Trichomonas seen     Wet Prep No yeast seen    Hemoglobin A1c   Result Value Ref Range    Hemoglobin A1C 5.5 0 - 5.6 %   CBC with platelets differential   Result Value Ref Range    WBC 8.7 4.0 - 11.0 10e9/L    RBC Count 4.86 3.8 - 5.2 10e12/L    Hemoglobin 13.1 11.7 - 15.7 g/dL    Hematocrit 39.4 35.0 - 47.0 %    MCV 81 78 - 100 fl    MCH 27.0 26.5 - 33.0 pg    MCHC 33.2 31.5 - 36.5 g/dL    RDW 14.4 10.0 - 15.0 %    Platelet Count 199 150 - 450 10e9/L    Diff Method Automated Method     % Neutrophils 65.3 %    % Lymphocytes 23.9 %    % Monocytes 6.9 %    % Eosinophils 3.7 %    % Basophils 0.2 %    Absolute Neutrophil 5.7 1.6 - 8.3 10e9/L    Absolute Lymphocytes 2.1 0.8 - 5.3 10e9/L    Absolute Monocytes 0.6 0.0 - 1.3 10e9/L    Absolute Eosinophils 0.3 0.0 - 0.7 10e9/L    Absolute Basophils 0.0 0.0 - 0.2 10e9/L       ASSESSMENT/PLAN:   Hx of obesity, enlarged tonsils, moderate persistent asthma, FH of diabetes, allergic to cats and dogs, seen by [facility] 5/3/18 and given flagyl for BV  Tdap and Advair dose increased but not started yet, here for     1. Vaginal odor  Go to lab for blood tests. Work on weight loss. Return for a physical  list of suggestions that may help treat vaginal infections and may help maintain a healthy vaginal environment.    Many of these suggestions are for;    1. boosting your immune system so you can heal faster  2.  changing the vaginal environment to a more acid state    3.  increasing the good healthy bacteria    Read through them and try the ones that seem to fit you and your life style.    Soak in a warm bath tub, no soap, no bubble bath and no oils.  Add one cup vinegar or lemon juice to bath water once in a while,     Keep a water bottle with a squirt top in your bathroom, fill with warm water and use as a spray after wiping, then pat dry.  May add 1-3 TBS vinegar to help maintain an acidic environment.    Wear cotton underwear; loose pants or skirt, no pantyhose.  No thong underwear.    Do not wear underwear to bed.  The vaginal environment needs to breathe.    Keep vaginal area dry, you can even use a hairdryer on cool setting after shower or bath    To boost your immune system increase daily intake of;  1. Rest  2. Fluids (2-3 quarts per day),    3. Foods such as nuts, grains, raw vegetables, yogurt, darin, grapefruit, unsweetened cranberries and juices (8 oz daily)  4. Vitamin intake (if not pregnant)              Vitamin B complex 100 mg              Calcium 1000 mg              Magnesium 500 mg              Vitamin C 2-4 grams              Vitamin E 1,000 IU              Vitamin A 50,000 IU    Decrease daily intake of refined sugars, honey, red meat and alcohol    At each meal drink 1tsp apple cider vinegar and 1 tsp honey in   cup warm water    Acidophilus 4-5 TBS in one quart of water 3-4 times daily or as tablets 2-3 tabs 3-4 times a day    Apply plain yogurt externally to vaginal area, chamomile or chickweed cream - applied externally for relief of itching (may be found commercially).    Echinacea - 3 times a day for chronic problem or every 2 hours for acute symptoms    Take garlic daily, capsules or fresh.      Boric acid, insert 2 capsules  00  daily into vagina for seven days (found at most [employer] stores or co-ops)    If your symptoms do not resolve or if you have questions please call the clinic.   "    - Wet prep  - NEISSERIA GONORRHOEA PCR  - CHLAMYDIA TRACHOMATIS PCR  - metroNIDAZOLE (METROGEL) 0.75 % vaginal gel; Place 1 applicator (5 G) vaginally At Bedtime for 5 days  Dispense: 70 G; Refill: 0    2. BV (bacterial vaginosis)  The lab test shows evidence of bacterial vaginosis(BV).It is one of the most common cause of vaginitis.It represents a complex change in the vaginal dani.Approximately 50 to 75 percent of women with BV are asymptomatic. Those with symptoms present with an unpleasant, \"fishy smelling\" discharge that is more noticeable after coitus.I do recommend treatment with metronidazole gel as given flagyl oral not too long ago. Avoid alcohol while using this medication.  Please call if any questions or concerns. Supportive care options to try to improve vaginal health given.   - metroNIDAZOLE (METROGEL) 0.75 % vaginal gel; Place 1 applicator (5 G) vaginally At Bedtime for 5 days  Dispense: 70 G; Refill: 0    3. Screen for STD (sexually transmitted disease)  - NEISSERIA GONORRHOEA PCR  - CHLAMYDIA TRACHOMATIS PCR  - Hepatitis B Surface Antibody  - Hepatitis B surface antigen  - Hepatitis C Screen Reflex to HCV RNA Quant and Genotype  - HIV Antigen Antibody Combo  - Treponema Abs w Reflex to RPR and Titer    4. Left knee pain, unspecified chronicity  Exam benign. Weight loss will help. Do quad strengthening for left knee pain for now. If not better or worse can do imaging and or refer to PT / ortho.     5. Moderate persistent asthma without complication  Improved on its on pn previous lower dose Advair still has 20 doses of that left, will go to 250/50 dose by end of month. Asthma action plan given. Reassess in a year if worth staying on higher dose or lower dose if cn be controlled on it then  - albuterol (PROAIR HFA/PROVENTIL HFA/VENTOLIN HFA) 108 (90 Base) MCG/ACT Inhaler; Inhale 2 puffs into the lungs every 6 hours as needed for shortness of breath / dyspnea or wheezing  Dispense: 1 Inhaler; " Refill: 1    6. Enlarged tonsils  See ENT if snoring or recurrent infections from enlarged tonsils.  - OTOLARYNGOLOGY REFERRAL    7. Morbid obesity (H)  Diet, exercise, weight loss at least 5 5 body weight. Discussed effect on groin health ( humidity ) and knees ) and risk of diabetes given family hx and snoring and sleep apnea  - TSH with free T4 reflex  - CBC with platelets differential  - Comprehensive metabolic panel  - Lipid panel reflex to direct LDL Non-fasting    8. Family history of diabetes mellitus  - Hemoglobin A1c    See Patient Instructions    Angela Pelayo MD  [facility]

## 2018-05-31 NOTE — PROGRESS NOTES
Lyle Blaser,  Some of your results came back and are within acceptable limits. -Normal red blood cell (hgb) levels, normal white blood cell count and normal platelet levels.. If you have any further concerns please do not hesitate to contact us by message, phone or making an appointment.  Have a good day   Dr Pierce XAVIER

## 2018-05-31 NOTE — PATIENT INSTRUCTIONS
"The lab test shows evidence of bacterial vaginosis(BV).It is one of the most common cause of vaginitis.It represents a complex change in the vaginal dani.Approximately 50 to 75 percent of women with BV are asymptomatic. Those with symptoms present with an unpleasant, \"fishy smelling\" discharge that is more noticeable after coitus.I do recommend treatment with metronidazole gel. Avoid alcohol while using this medication.  Please call if any questions or concerns.     Go to lab for blood tests  Asthma action plan given  work on weight loss  Do quad strengthening for left knee pain for now  See ENt if snoring or recurrent infections form enlarged tonsils  Return for a physical      Here is a list of suggestions that may help treat vaginal infections and may help maintain a healthy vaginal environment.    Many of these suggestions are for;    1. boosting your immune system so you can heal faster  2. changing the vaginal environment to a more acid state    3.  increasing the good healthy bacteria    Read through them and try the ones that seem to fit you and your life style.    Soak in a warm bath tub, no soap, no bubble bath and no oils.  Add one cup vinegar or lemon juice to bath water once in a while,     Keep a water bottle with a squirt top in your bathroom, fill with warm water and use as a spray after wiping, then pat dry.  May add 1-3 TBS vinegar to help maintain an acidic environment.    Wear cotton underwear; loose pants or skirt, no pantyhose.  No thong underwear.    Do not wear underwear to bed.  The vaginal environment needs to breathe.    Keep vaginal area dry, you can even use a hairdryer on cool setting after shower or bath    To boost your immune system increase daily intake of;  1. Rest  2. Fluids (2-3 quarts per day),    3. Foods such as nuts, grains, raw vegetables, yogurt, darin, grapefruit, unsweetened cranberries and juices (8 oz daily)  4. Vitamin intake (if not pregnant)              Vitamin B " complex 100mg              Calcium 1000mg              Magnesium 500mg              Vitamin C 2-4 grams              Vitamin E 1,000 IU              Vitamin A 50,000 IU    Decrease daily intake of refined sugars, honey, red meat and alcohol    At each meal drink 1tsp apple cider vinegar and 1 tsp honey in   cup warm water    Acidophilus 4-5 TBS in one quart of water 3-4 times daily or as tablets 2-3 tabs 3-4 times a day    Apply plain yogurt externally to vaginal area, chamomile or chickweed cream - applied externally for relief of itching (may be found commercially).    Echinacea - 3 times a day for chronic problem or every 2 hours for acute symptoms    Take garlic daily, capsules or fresh.      Boric acid, insert 2 capsules  00  daily into vagina for seven days (found at most health food stores or co-ops)    If your symptoms do not resolve or if you have questions please call the clinic.

## 2018-05-31 NOTE — LETTER
My Asthma Action Plan  Name: Yael Johnson   YOB: 1990  Date: 5/31/2018   My doctor: Angela Pelayo MD   My clinic: [facility]        My Control Medicine: Fluticasone + salmeterol (Advair) -  Diskus 250/50 mcg 1 puff twice a day ( currently completing 100/50 1 puff twice a day )   My Rescue Medicine: Albuterol (Proair/Ventolin/Proventil) inhaler 2 puffs every 6 hrs as needed    My Asthma Severity: mild persistent  Avoid your asthma triggers: animal dander, exercise or sports and cold air               GREEN ZONE   Good Control    I feel good    No cough or wheeze    Can work, sleep and play without asthma symptoms       Take your asthma control medicine every day.     1. If exercise triggers your asthma, take your rescue medication    15 minutes before exercise or sports, and    During exercise if you have asthma symptoms  2. Spacer to use with inhaler: If you have a spacer, make sure to use it with your inhaler             YELLOW ZONE Getting Worse  I have ANY of these:    I do not feel good    Cough or wheeze    Chest feels tight    Wake up at night   1. Keep taking your Green Zone medications  2. Start taking your rescue medicine:    every 20 minutes for up to 1 hour. Then every 4 hours for 24-48 hours.  3. If you stay in the Yellow Zone for more than 12-24 hours, contact your doctor.  4. If you do not return to the Green Zone in 12-24 hours or you get worse, start taking your oral steroid medicine if prescribed by your provider.           RED ZONE Medical Alert - Get Help  I have ANY of these:    I feel awful    Medicine is not helping    Breathing getting harder    Trouble walking or talking    Nose opens wide to breathe       1. Take your rescue medicine NOW  2. If your provider has prescribed an oral steroid medicine, start taking it NOW  3. Call your doctor NOW  4. If you are still in the Red Zone after 20 minutes and you have not reached your doctor:    Take your rescue  medicine again and    Call 911 or go to the emergency room right away    See your regular doctor within 2 weeks of an Emergency Room or Urgent Care visit for follow-up treatment.          Annual Reminders:  Meet with Asthma Educator,  Flu Shot in the Fall, consider Pneumonia Vaccination for patients with asthma (aged 19 and older).    Pharmacy:    [facility] [id number] [place], [place] - [address] AT [facility] [place] [place] [address] [facility] [place], [place] - [address]  [id number] - [place], [place] - [address]                      Asthma Triggers  How To Control Things That Make Your Asthma Worse    Triggers are things that make your asthma worse.  Look at the list below to help you find your triggers and what you can do about them.  You can help prevent asthma flare-ups by staying away from your triggers.      Trigger                                                          What you can do   Cigarette Smoke  Tobacco smoke can make asthma worse. Do not allow smoking in your home, car or around you.  Be sure no one smokes at a child s day care or school.  If you smoke, ask your health care provider for ways to help you quit.  Ask family members to quit too.  Ask your health care provider for a referral to Quit Plan to help you quit smoking, or call 2-153-111-PLAN.     Colds, Flu, Bronchitis  These are common triggers of asthma. Wash your hands often.  Don t touch your eyes, nose or mouth.  Get a flu shot every year.     Dust Mites  These are tiny bugs that live in cloth or carpet. They are too small to see. Wash sheets and blankets in hot water every week.   Encase pillows and mattress in dust mite proof covers.  Avoid having carpet if you can. If you have carpet, vacuum weekly.   Use a dust mask and HEPA vacuum.   Pollen and Outdoor Mold  Some people are allergic to trees, grass, or weed pollen, or molds. Try to keep your windows closed.  Limit time  out doors when pollen count is high.   Ask you health care provider about taking medicine during allergy season.     Animal Dander  Some people are allergic to skin flakes, urine or saliva from pets with fur or feathers. Keep pets with fur or feathers out of your home.    If you can t keep the pet outdoors, then keep the pet out of your bedroom.  Keep the bedroom door closed.  Keep pets off cloth furniture and away from stuffed toys.     Mice, Rats, and Cockroaches  Some people are allergic to the waste from these pests.   Cover food and garbage.  Clean up spills and food crumbs.  Store grease in the refrigerator.   Keep food out of the bedroom.   Indoor Mold  This can be a trigger if your home has high moisture. Fix leaking faucets, pipes, or other sources of water.   Clean moldy surfaces.  Dehumidify basement if it is damp and smelly.   Smoke, Strong Odors, and Sprays  These can reduce air quality. Stay away from strong odors and sprays, such as perfume, powder, hair spray, paints, smoke incense, paint, cleaning products, candles and new carpet.   Exercise or Sports  Some people with asthma have this trigger. Be active!  Ask your doctor about taking medicine before sports or exercise to prevent symptoms.    Warm up for 5-10 minutes before and after sports or exercise.     Other Triggers of Asthma  Cold air:  Cover your nose and mouth with a scarf.  Sometimes laughing or crying can be a trigger.  Some medicines and food can trigger asthma.

## 2018-05-31 NOTE — MR AVS SNAPSHOT
"              After Visit Summary   5/31/2018    Yael Johnson    MRN: [id number]           Patient Information     Date Of Birth          1990        Visit Information        Provider Department      5/31/2018 2:00 PM Angela Pelayo MD [facility]        Today's Diagnoses     Vaginal odor    -  1    BV (bacterial vaginosis)        Screen for STD (sexually transmitted disease)        Morbid obesity (H)        Moderate persistent asthma without complication        Left knee pain, unspecified chronicity        Enlarged tonsils        Family history of diabetes mellitus          Care Instructions    The lab test shows evidence of bacterial vaginosis(BV).It is one of the most common cause of vaginitis.It represents a complex change in the vaginal dani.Approximately 50 to 75 percent of women with BV are asymptomatic. Those with symptoms present with an unpleasant, \"fishy smelling\" discharge that is more noticeable after coitus.I do recommend treatment with metronidazole gel. Avoid alcohol while using this medication.  Please call if any questions or concerns.     Go to lab for blood tests  Asthma action plan given  work on weight loss  Do quad strengthening for left knee pain for now  See ENt if snoring or recurrent infections form enlarged tonsils  Return for a physical      Here is a list of suggestions that may help treat vaginal infections and may help maintain a healthy vaginal environment.    Many of these suggestions are for;    1. boosting your immune system so you can heal faster  2. changing the vaginal environment to a more acid state    3.  increasing the good healthy bacteria    Read through them and try the ones that seem to fit you and your life style.    Soak in a warm bath tub, no soap, no bubble bath and no oils.  Add one cup vinegar or lemon juice to bath water once in a while,     Keep a water bottle with a squirt top in your bathroom, fill with warm water and use as a spray " after wiping, then pat dry.  May add 1-3 TBS vinegar to help maintain an acidic environment.    Wear cotton underwear; loose pants or skirt, no pantyhose.  No thong underwear.    Do not wear underwear to bed.  The vaginal environment needs to breathe.    Keep vaginal area dry, you can even use a hairdryer on cool setting after shower or bath    To boost your immune system increase daily intake of;  1. Rest  2. Fluids (2-3 quarts per day),    3. Foods such as nuts, grains, raw vegetables, yogurt, darin, grapefruit, unsweetened cranberries and juices (8 oz daily)  4. Vitamin intake (if not pregnant)              Vitamin B complex 100mg              Calcium 1000mg              Magnesium 500mg              Vitamin C 2-4 grams              Vitamin E 1,000 IU              Vitamin A 50,000 IU    Decrease daily intake of refined sugars, honey, red meat and alcohol    At each meal drink 1tsp apple cider vinegar and 1 tsp honey in   cup warm water    Acidophilus 4-5 TBS in one quart of water 3-4 times daily or as tablets 2-3 tabs 3-4 times a day    Apply plain yogurt externally to vaginal area, chamomile or chickweed cream - applied externally for relief of itching (may be found commercially).    Echinacea - 3 times a day for chronic problem or every 2 hours for acute symptoms    Take garlic daily, capsules or fresh.      Boric acid, insert 2 capsules  00  daily into vagina for seven days (found at most health food stores or co-ops)    If your symptoms do not resolve or if you have questions please call the clinic.              Follow-ups after your visit        Additional Services     OTOLARYNGOLOGY REFERRAL       Your provider has referred you to: [facility] - [place] [phone]  [email or web address]  [facility]: [facility] [place] [phone] [email or web address]    Please be aware that coverage of these  "services is subject to the terms and limitations of your health insurance plan.  Call member services at your health plan with any benefit or coverage questions.      Please bring the following with you to your appointment:    (1) Any X-Rays, CTs or MRIs which have been performed.  Contact the facility where they were done to arrange for  prior to your scheduled appointment.   (2) List of current medications  (3) This referral request   (4) Any documents/labs given to you for this referral                  Who to contact     If you have questions or need follow up information about today's clinic visit or your schedule please contact [facility] directly at [phone].  Normal or non-critical lab and imaging results will be communicated to you by MyChart, letter or phone within 4 business days after the clinic has received the results. If you do not hear from us within 7 days, please contact the clinic through MyChart or phone. If you have a critical or abnormal lab result, we will notify you by phone as soon as possible.  Submit refill requests through [employer] or call your pharmacy and they will forward the refill request to us. Please allow 3 business days for your refill to be completed.          Additional Information About Your Visit        [employer] Information     [employer] lets you send messages to your doctor, view your test results, renew your prescriptions, schedule appointments and more. To sign up, go to [email or web address] . Click on \"Log in\" on the left side of the screen, which will take you to the Welcome page. Then click on \"Sign up Now\" on the right side of the page.     You will be asked to enter the access code listed below, as well as some personal information. Please follow the directions to create your username and password.     Your access code is: [email or web address]  Expires: 2018  3:21 PM     Your access code will  in 90 days. If you need help or a new code, " "please call your [facility] clinic or [phone].        Care EveryWhere ID     This is your Care EveryWhere ID. This could be used by other organizations to access your [facility] medical records  [id number]        Your Vitals Were     Pulse Temperature Respirations Height Last Period Pulse Oximetry    78 98.3  F (36.8  C) (Oral) 17 5' 4.5\" (1.638 m) 05/19/2018 100%    BMI (Body Mass Index)                   40.98 kg/m2            Blood Pressure from Last 3 Encounters:   05/31/18 124/67   05/03/18 121/81   08/02/17 (!) 137/95    Weight from Last 3 Encounters:   05/31/18 242 lb 8 oz (110 kg)   10/07/16 242 lb 4.8 oz (109.9 kg)   05/09/16 236 lb (107 kg)              We Performed the Following     CBC with platelets differential     CHLAMYDIA TRACHOMATIS PCR     Comprehensive metabolic panel     Hemoglobin A1c     Hepatitis B Surface Antibody     Hepatitis B surface antigen     Hepatitis C Screen Reflex to HCV RNA Quant and Genotype     HIV Antigen Antibody Combo     Lipid panel reflex to direct LDL Non-fasting     NEISSERIA GONORRHOEA PCR     OTOLARYNGOLOGY REFERRAL     Treponema Abs w Reflex to RPR and Titer     TSH with free T4 reflex     Wet prep          Today's Medication Changes          These changes are accurate as of 5/31/18  2:43 PM.  If you have any questions, ask your nurse or doctor.               Start taking these medicines.        Dose/Directions    metroNIDAZOLE 0.75 % vaginal gel   Commonly known as:  METROGEL   Used for:  Vaginal odor, BV (bacterial vaginosis)   Started by:  Angela Pelayo MD        Dose:  1 applicator   Place 1 applicator (5 g) vaginally At Bedtime for 5 days   Quantity:  70 g   Refills:  0         These medicines have changed or have updated prescriptions.        Dose/Directions    * albuterol (2.5 MG/3ML) 0.083% neb solution   This may have changed:  Another medication with the same name was added. Make sure you understand how and when to take each.   Used for:  Mild persistent " asthma without complication   Changed by:  Angela Pelayo MD        Dose:  1 vial   Take 1 vial (2.5 mg) by nebulization every 6 hours as needed for shortness of breath / dyspnea   Quantity:  1 Box   Refills:  11       * albuterol 108 (90 Base) MCG/ACT Inhaler   Commonly known as:  PROAIR HFA/PROVENTIL HFA/VENTOLIN HFA   This may have changed:  You were already taking a medication with the same name, and this prescription was added. Make sure you understand how and when to take each.   Used for:  Moderate persistent asthma without complication   Changed by:  Angela Pelayo MD        Dose:  2 puff   Inhale 2 puffs into the lungs every 6 hours as needed for shortness of breath / dyspnea or wheezing   Quantity:  1 Inhaler   Refills:  1       * Notice:  This list has 2 medication(s) that are the same as other medications prescribed for you. Read the directions carefully, and ask your doctor or other care provider to review them with you.         Where to get your medicines      These medications were sent to [facility] [address]  [address] [facility] [address]     Phone:  [phone]     albuterol 108 (90 Base) MCG/ACT Inhaler    metroNIDAZOLE 0.75 % vaginal gel                Primary Care Provider Fax #    Physician No Ref-Primary [phone]       No address on file        Equal Access to Services     ZONIA FITZGERALD AH: Hadii adrian tripletto Sodanuta, waaxda luqadaha, qaybta kaalmada adedrake, demetra joya. So [facility] [phone].    ATENCIÓN: Si habla español, tiene a moran disposición servicios gratuitos de asistencia lingüística. Llame al [phone].    We comply with applicable federal civil rights laws and [place] laws. We do not discriminate on the basis of race, color, national origin, age, disability, sex, sexual orientation, or gender identity.            Thank you!     Thank you for choosing [facility]  for your care. Our  goal is always to provide you with excellent care. Hearing back from our patients is one way we can continue to improve our services. Please take a few minutes to complete the written survey that you may receive in the mail after your visit with us. Thank you!             Your Updated Medication List - Protect others around you: Learn how to safely use, store and throw away your medicines at www.disposemymeds.org.          This list is accurate as of 5/31/18  2:43 PM.  Always use your most recent med list.                   Brand Name Dispense Instructions for use Diagnosis    * albuterol (2.5 MG/3ML) 0.083% neb solution     1 Box    Take 1 vial (2.5 mg) by nebulization every 6 hours as needed for shortness of breath / dyspnea    Mild persistent asthma without complication       * albuterol 108 (90 Base) MCG/ACT Inhaler    PROAIR HFA/PROVENTIL HFA/VENTOLIN HFA    1 Inhaler    Inhale 2 puffs into the lungs every 6 hours as needed for shortness of breath / dyspnea or wheezing    Moderate persistent asthma without complication       fluticasone-salmeterol 250-50 MCG/DOSE diskus inhaler    ADVAIR    3 Inhaler    Inhale 1 puff into the lungs 2 times daily    Moderate persistent asthma without complication       metroNIDAZOLE 0.75 % vaginal gel    METROGEL    70 g    Place 1 applicator (5 g) vaginally At Bedtime for 5 days    Vaginal odor, BV (bacterial vaginosis)       * Notice:  This list has 2 medication(s) that are the same as other medications prescribed for you. Read the directions carefully, and ask your doctor or other care provider to review them with you.

## 2018-06-01 LAB
ALBUMIN SERPL-MCNC: 4 G/DL (ref 3.4–5)
ALP SERPL-CCNC: 65 U/L (ref 40–150)
ALT SERPL W P-5'-P-CCNC: 22 U/L (ref 0–50)
ANION GAP SERPL CALCULATED.3IONS-SCNC: 9 MMOL/L (ref 3–14)
AST SERPL W P-5'-P-CCNC: 16 U/L (ref 0–45)
BILIRUB SERPL-MCNC: 0.6 MG/DL (ref 0.2–1.3)
BUN SERPL-MCNC: 11 MG/DL (ref 7–30)
C TRACH DNA SPEC QL NAA+PROBE: NEGATIVE
CALCIUM SERPL-MCNC: 8.7 MG/DL (ref 8.5–10.1)
CHLORIDE SERPL-SCNC: 106 MMOL/L (ref 94–109)
CHOLEST SERPL-MCNC: 96 MG/DL
CO2 SERPL-SCNC: 24 MMOL/L (ref 20–32)
CREAT SERPL-MCNC: 0.73 MG/DL (ref 0.52–1.04)
GFR SERPL CREATININE-BSD FRML MDRD: >90 ML/MIN/1.7M2
GLUCOSE SERPL-MCNC: 97 MG/DL (ref 70–99)
HBV SURFACE AB SERPL IA-ACNC: >1000 M[IU]/ML
HBV SURFACE AG SERPL QL IA: NONREACTIVE
HCV AB SERPL QL IA: NONREACTIVE
HDLC SERPL-MCNC: 29 MG/DL
HIV 1+2 AB+HIV1 P24 AG SERPL QL IA: NONREACTIVE
LDLC SERPL CALC-MCNC: 52 MG/DL
N GONORRHOEA DNA SPEC QL NAA+PROBE: NEGATIVE
NONHDLC SERPL-MCNC: 67 MG/DL
POTASSIUM SERPL-SCNC: 3.5 MMOL/L (ref 3.4–5.3)
PROT SERPL-MCNC: 8 G/DL (ref 6.8–8.8)
SODIUM SERPL-SCNC: 139 MMOL/L (ref 133–144)
SPECIMEN SOURCE: NORMAL
SPECIMEN SOURCE: NORMAL
T PALLIDUM AB SER QL: NONREACTIVE
TRIGL SERPL-MCNC: 73 MG/DL
TSH SERPL DL<=0.005 MIU/L-ACNC: 0.55 MU/L (ref 0.4–4)

## 2018-06-01 ASSESSMENT — ASTHMA QUESTIONNAIRES: ACT_TOTALSCORE: 24

## 2018-06-01 NOTE — PROGRESS NOTES
Lyle Ms. Johnson,  Your results came back negative for Hep B, Hep C & HIV infection. You are immune to hep b from prior vaccinations which is good. If you have any further concerns please do not hesitate to contact us by message, phone or making an appointment.  Have a good day   Dr Pierce XAVIER

## 2018-06-01 NOTE — PROGRESS NOTES
Lyle Ms. Johnson,  Your results came back and are within acceptable limits. -Liver and gallbladder tests are normal. (ALT,AST, Alk phos, bilirubin), kidney function is normal (Cr, GFR), Sodium is normal, Potassium is normal, Calcium is normal, Glucose is normal (diabetes screening test).   -Cholesterol levels (LDL,HDL, Triglycerides) are normal.  ADVISE: rechecking in 1 year.  -TSH (thyroid stimulating hormone) level is normal which indicates normal thyroid function.. If you have any further concerns please do not hesitate to contact us by message, phone or making an appointment.  Have a good day   Dr Pierce XAVIER

## 2018-06-01 NOTE — PROGRESS NOTES
Lyle Ms. Johnson,  Your results came back and are within acceptable limits. Negative for syphilis. If you have any further concerns please do not hesitate to contact us by message, phone or making an appointment.  Have a good day   Dr Pierce XAVIER

## 2018-06-01 NOTE — PROGRESS NOTES
Lyle Ms. Johnson,  Your results came back and are within acceptable limits. -Chlamydia and gonnohrea tests are normal.. If you have any further concerns please do not hesitate to contact us by message, phone or making an appointment.  Have a good day   Dr Pierce XAVIER

## 2018-12-14 ENCOUNTER — PATIENT OUTREACH (OUTPATIENT)
Dept: CARE COORDINATION | Facility: CLINIC | Age: 28
End: 2018-12-14

## 2018-12-17 NOTE — PROGRESS NOTES
Clinic Care Coordination Contact    Received call from patient with question about Advair inhaler.    Patient states she is almost out of her inhaler.  She recently started new job and insurance is not active until 1 jan.  Patient states she cannot afford to pay out of pocket for inhaler.   Patient is wondering if pcp could write fill the Rx and she could be billed for Advir.    RN CC reviewed chart. Patient has not been seen at [facility] in over a year.  PCP is listed as Dr. Angela Pelayo, patient verified this was correct.      RNCC encouraged patient to speak to her PCP directly about this.  Another option would be to go to Advair 's website and request a sample to get by for next few weeks.      Plan:  Patient will request sample from .  No other needs at this time.  Encounter will be closed.      Olga Lidia Lilly RN-BSN   Care Coordination  Phone:  [phone]  Email: [email or web address]

## 2019-01-03 ENCOUNTER — TELEPHONE (OUTPATIENT)
Dept: FAMILY MEDICINE | Facility: CLINIC | Age: 29
End: 2019-01-03

## 2019-01-03 NOTE — TELEPHONE ENCOUNTER
Prior Authorization Retail Medication Request    Medication/Dose: advair  ICD code (if different than what is on RX):    Previously Tried and Failed:    Rationale:      Insurance Name:  [facility]/[employer] health  Insurance ID:  [id number]      Pharmacy Information (if different than what is on RX)  Name:    Phone:        Thank you!  Ghada Hsu  [facility]  On behalf of [facility]

## 2019-01-04 NOTE — TELEPHONE ENCOUNTER
Pt called about her Advair.  Pt is wanting the 100/50.  I did tell the pt that the advair was sent to her pharmacy yest.  It looks like this med may be requiring a PA.    Pt will check with her pharmacy.  PT did just get new insurance starting 1/1/2019.  I did try to educate pt on the PA process.    Pt is not willing to try a med covered by her insurance.  I recommended pt talk to her insurance about Advair and alt options.  HUGO Brody

## 2019-01-05 ENCOUNTER — NURSE TRIAGE (OUTPATIENT)
Dept: NURSING | Facility: CLINIC | Age: 29
End: 2019-01-05

## 2019-01-05 NOTE — TELEPHONE ENCOUNTER
Tariq   has some food stuck in back of throat and   after drinking water , food passed but  she then examined her tonsils and notes white patch on one; has no sore throat or fever or is ill in any wayl; has always had enlarged tonsils   advised to  gargle with  salt water to clean area and if patch remains make an appointment to see PCP for evaluation   Understands and will comply   Khushi Lr RN  FNA      Reason for Disposition    All other mouth symptoms (Exceptions: dry mouth from not drinking enough liquids, chapped lips)    Additional Information    Negative: Severe difficulty breathing (e.g., struggling for each breath, speaks in single words, stridor)    Negative: Sounds like a life-threatening emergency to the triager    Negative: Injury to tooth or teeth    Negative: Injury to mouth    Negative: Canker sore suspected (i.e., aphthous ulcer) in the mouth    Negative: Cold sore suspected (i.e., fever blister sore) on the outer lip    Negative: Tooth is painful or swelling around a tooth    Negative: Mouth is painful    Negative: Throat is painful    Negative: Tongue swelling    Negative: Lip swelling    Negative: [1] Drooling or spitting out saliva (because can't swallow) AND [2] new onset    Negative: [1] Bleeding from mouth AND [2] won't stop after 10 minutes of direct pressure    Negative: Electrical burn of mouth    Negative: Chemical burn of mouth    Negative: [1] Difficulty breathing AND [2] not severe    Negative: Patient sounds very sick or weak to the triager    Negative: Receiving chemotherapy or radiation therapy    Negative: White patches that stick to tongue or inner cheek    Negative: [1] Dry mouth AND [2] new onset AND [3] unexplained(Exceptions: recurrent ongoing problem or dry mouth from mild dehydration)    Negative: Weak immune system (e.g., HIV positive, cancer chemo, splenectomy, organ transplant, chronic steroids)    Negative: Dentures rub and cause pain    Negative: Painless lump  in mouth    Negative: Red patch in mouth    Negative: White patch in mouth    Protocols used: MOUTH SYMPTOMS-ADULT-AH

## 2019-01-08 NOTE — PROGRESS NOTES
SUBJECTIVE:   Yael Johnson is a 28 year old female who presents to clinic today for the following health issues:      SYMPTOMS      Duration: Friday, resolved yesterday    Description  White spots on left side of throat which self-resolved    Severity: no pain    Accompanying signs and symptoms: fatigued lately    History (predisposing factors):  Enlarged tonsils for past 2 years    Precipitating or alleviating factors: None    Therapies tried and outcome:  none    Friday developed white spots on tonsils, no throat pain.  Spots were on left tonsil scattered throughout.  Hx of enlarged tonsils x 2 years.  No other URI symptoms.  Otherwise felt normal.  She was eating food, felt like it got stuck, flushed it down with water, then looked in mirror to check and noticed dots.  Has noticed white spots once before, thinks was followed by sore throat, unsure how long ago this was.      New sexual partner, wondering if this could be STD.  Had std check recently, did not have oral swab.    Using condoms and pull out method.  Periods are regular.  No hx migraines.    Patient Active Problem List   Diagnosis     Family history of diabetes mellitus     Enlarged tonsils     Moderate persistent asthma     Morbid obesity (H)     Past Surgical History:   Procedure Laterality Date     US BREAST BIOPSY LT  2017    benign, still has a lump       Social History     Tobacco Use     Smoking status: Never Smoker     Smokeless tobacco: Never Used   Substance Use Topics     Alcohol use: Yes     Comment: OCC     Family History   Problem Relation Age of Onset     Hypertension Mother      Diabetes Mother         type 2     Arthritis Mother      Diabetes Maternal Grandmother         type 2     Cardiovascular Paternal Grandfather         Lung         Current Outpatient Medications   Medication Sig Dispense Refill     ADVAIR DISKUS 100-50 MCG/DOSE inhaler INHALE 1 PUFF INTO THE LUNGS EVERY 12 HOURS 60 Inhaler 0     albuterol (2.5 MG/3ML)  0.083% nebulizer solution Take 1 vial (2.5 mg) by nebulization every 6 hours as needed for shortness of breath / dyspnea 1 Box 11     albuterol (PROAIR HFA/PROVENTIL HFA/VENTOLIN HFA) 108 (90 Base) MCG/ACT Inhaler Inhale 2 puffs into the lungs every 6 hours as needed for shortness of breath / dyspnea or wheezing 1 Inhaler 1     etonogestrel-ethinyl estradiol (NUVARING) 0.12-0.015 MG/24HR vaginal ring Place 1 each vaginally every 28 days 3 each 3     fluticasone-salmeterol (ADVAIR) 250-50 MCG/DOSE diskus inhaler Inhale 1 puff into the lungs 2 times daily (Patient not taking: Reported on 1/9/2019) 3 Inhaler 1       ROS:  Const, HEENT,  as above, otherwise negative       OBJECTIVE:                                                    /64 (BP Location: Left arm, Patient Position: Chair, Cuff Size: Adult Large)   Pulse 95   Temp 98.6  F (37  C) (Oral)   Resp 12   Wt 115.2 kg (254 lb)   SpO2 100%   BMI 42.93 kg/m     GENERAL APPEARANCE: healthy, alert and no distress  EYES: Eyes grossly normal to inspection and conjunctivae and sclerae normal  HENT: oropharynx appears normal, tonsils +2 bilaterally without exudate, tonsils are pitted  RESP: respirations nonlabored   PSYCH: mentation appears normal and affect normal/bright        ASSESSMENT/PLAN:                                                    (J35.8) Tonsillar exudate  (primary encounter diagnosis)  Comment: suspect food debris, self resolved after a few days.  No pain or URI symptoms to suggest infection  Plan: NEISSERIA GONORRHOEA PCR, CHLAMYDIA TRACHOMATIS        PCR        Discussed appearance and symptoms not consistent with infection, she would like to be sure and so we did chlamydia/GC swab today.  Reviewed pitted tonsils and food debris.      (Z30.015) Encounter for initial prescription of vaginal ring hormonal contraceptive  Comment: sexually active using condoms and pull out, no estrogen contraindications  Plan: etonogestrel-ethinyl estradiol  (NUVARING)         0.12-0.015 MG/24HR vaginal ring        Reviewed risks and benefits of various options for contraception/cycle control including OCP, depo, impant, IUD, patch, and ring.  Reviewed VTE risk with estrogen products.  She is interested in starting nuvaring. Discussed quick start and reviewed dosing, discussed using backup for 10 days after starting the pill.  Reviewed possible side effects including spotting which should resolve in 3 months.  Discussed importance of adherence for effective contraception.  Recommended condom use with new partners to prevent STDs.          See Patient Instructions    Francoise Pérez [facility]    Patient Instructions     Patient Education   1.  Start nuvaring today or tomorrow  2.  Throat swab  3.  I think white spots were just food debris    Birth Control Methods  Birth control methods are used to help prevent pregnancy. There are many different methods to choose from. Talk to your healthcare provider about which method is right for you. Be sure to ask your provider about the effectiveness of each method. Also ask about the benefits, risks, and side effects of each method.  Hormones  Some birth control methods work by releasing hormones such as progestin and estrogen. These methods include hormone implants, hormone shots, the vaginal ring, the patch, and birth control pills. They all work by stopping ovulation (release of the egg from the ovary). The implant is a small device that needs to be placed in the upper arm by a trained healthcare provider. It works for up to 3 years. Hormone injections must be repeated every 3 months. The vaginal ring must be replaced monthly (it can be removed during the fourth week of each cycle). The patch must be replaced weekly (it is not worn during the fourth week of each cycle). Birth control pills must be taken every day. All of these methods are effective and can be stopped at any time.  Intrauterine device  (IUD)  An IUD is a small, T-shaped device. It must be placed in the uterus by a trained healthcare provider. There are different types of IUDs available. They work by causing changes in the uterus that make it harder for sperm to reach the egg. Depending on the type of IUD you have, it may work for several years or longer. The IUD is a reversible birth control method. This means it can be removed at any time.  Condom  A condom is a sheath that forms a thin barrier between the penis and the vagina. It helps prevent pregnancy by keeping sperm from entering the vagina. When latex condoms are used, they have the added benefit of protecting against most STIs (sexually transmitted infections). Condoms are used each time there is sexual intercourse and should be discarded after each use. Ask your healthcare provider about the different types of condoms available. These include both the male condom and female condom.  Spermicide  Spermicides come as foams, jellies, creams, suppositories, and tablets.  They help prevent pregnancy by killing sperm. When used alone they are not that reliable. They work best when combined with other birth control methods such as diaphragms and cervical caps.  Sponge, diaphragm, and cervical cap  All of these methods help prevent pregnancy by covering the opening of the uterus (cervix). This prevents sperm from passing through.  The sponge contains spermicide. It can be bought over the counter. The sponge must be left in place for at least 6 hours after the last time you have sex. However, it should not stay in place for more than 24 hours. It should be discarded after it is used.  The diaphragm and cervical cap must be fitted and prescribed by your healthcare provider. Both are used with spermicide. The diaphragm must be left in place for at least 6 hours after sex. However, it should not stay in place for more than 24 hours. It can be washed and reused. The cervical cap must be left in place  for at least 6 hours after sex. However, it should not stay in place for more than 48 hours. It can be washed and reused.  Withdrawal method  This is when the man pulls his penis out of the vagina just before ejaculation ( coming ). This lowers the amount of sperm entering the vagina. Be aware that fluids released just before ejaculation often still contain some sperm, so this method is not as reliable as certain other methods.  Rhythm method  This method requires that you know when in your menstrual cycle you are likely to become pregnant. Then, you avoid sex during those days. This requires careful planning and good discipline. Your healthcare provider can explain more about how this works.  Tubal ligation and vasectomy  These are surgical methods to prevent pregnancy. Tubal ligation is an option for women. The fallopian tubes are blocked or cut (ligated). This keeps the egg from passing into the uterus or sperm from reaching the egg. Vasectomy is an option for men. The tubes that normally carry sperm to the penis are either closed or blocked. Both tubal ligation and vasectomy are permanent birth control methods. This means reversal is either not possible or unlikely to work. They are good choices for women and men who know that they do not want to have children in the future.  Date Last Reviewed: 11/1/2017 2000-2018 [employer]. [address] [place] [address], [place] [address]. All rights reserved. This information is not intended as a substitute for professional medical care. Always follow your healthcare professional's instructions.

## 2019-01-08 NOTE — TELEPHONE ENCOUNTER
Advair was approved per note below but not clear if this is the old insurance or the insurance that is starting 1/1/19    [facility] pharmacy called. Pt did  the Advair Diskus 150/100    Attempted to reach, unable. Left message  to call back. (called pharmacy after making this call to pt - not sure if we need to talk to her now unless to relay a message from PCP)    Arun - will you need to see pt in clinic or do you want her called back later to recheck ACT now that she got her Advair?      Pretty Issa RN

## 2019-01-08 NOTE — TELEPHONE ENCOUNTER
ACT Total Scores 5/31/2018 5/31/2018 1/8/2019   ACT TOTAL SCORE - - -   ASTHMA ER VISITS - - -   ASTHMA HOSPITALIZATIONS - - -   ACT TOTAL SCORE (Goal Greater than or Equal to 20) 24 24 16   In the past 12 months, how many times did you visit the emergency room for your asthma without being admitted to the hospital? 1 1 0   In the past 12 months, how many times were you hospitalized overnight because of your asthma? 0 0 0     Pt score was 16 today, Pt notes that this is due to being out of preventive inhaler for the past 3 weeks. She normally doesn't wake up at night with wheezing nor does she typically need to use her inhaler so often or feel so short of breath.    Sylvie Duvall, CMA

## 2019-01-09 ENCOUNTER — OFFICE VISIT (OUTPATIENT)
Dept: FAMILY MEDICINE | Facility: CLINIC | Age: 29
End: 2019-01-09
Payer: COMMERCIAL

## 2019-01-09 VITALS
RESPIRATION RATE: 12 BRPM | BODY MASS INDEX: 42.93 KG/M2 | DIASTOLIC BLOOD PRESSURE: 64 MMHG | TEMPERATURE: 98.6 F | HEART RATE: 95 BPM | OXYGEN SATURATION: 100 % | WEIGHT: 254 LBS | SYSTOLIC BLOOD PRESSURE: 108 MMHG

## 2019-01-09 DIAGNOSIS — J35.8 TONSILLAR EXUDATE: Primary | ICD-10-CM

## 2019-01-09 DIAGNOSIS — Z30.015 ENCOUNTER FOR INITIAL PRESCRIPTION OF VAGINAL RING HORMONAL CONTRACEPTIVE: ICD-10-CM

## 2019-01-09 PROCEDURE — 87591 N.GONORRHOEAE DNA AMP PROB: CPT | Performed by: NURSE PRACTITIONER

## 2019-01-09 PROCEDURE — 87491 CHLMYD TRACH DNA AMP PROBE: CPT | Performed by: NURSE PRACTITIONER

## 2019-01-09 PROCEDURE — 99213 OFFICE O/P EST LOW 20 MIN: CPT | Performed by: NURSE PRACTITIONER

## 2019-01-09 RX ORDER — ETONOGESTREL AND ETHINYL ESTRADIOL VAGINAL RING .015; .12 MG/D; MG/D
1 RING VAGINAL
Qty: 3 EACH | Refills: 3 | Status: SHIPPED | OUTPATIENT
Start: 2019-01-09 | End: 2019-03-08

## 2019-01-09 ASSESSMENT — ASTHMA QUESTIONNAIRES: ACT_TOTALSCORE: 16

## 2019-01-09 NOTE — PATIENT INSTRUCTIONS
Patient Education   1.  Start nuvaring today or tomorrow  2.  Throat swab  3.  I think white spots were just food debris    Birth Control Methods  Birth control methods are used to help prevent pregnancy. There are many different methods to choose from. Talk to your healthcare provider about which method is right for you. Be sure to ask your provider about the effectiveness of each method. Also ask about the benefits, risks, and side effects of each method.  Hormones  Some birth control methods work by releasing hormones such as progestin and estrogen. These methods include hormone implants, hormone shots, the vaginal ring, the patch, and birth control pills. They all work by stopping ovulation (release of the egg from the ovary). The implant is a small device that needs to be placed in the upper arm by a trained healthcare provider. It works for up to 3 years. Hormone injections must be repeated every 3 months. The vaginal ring must be replaced monthly (it can be removed during the fourth week of each cycle). The patch must be replaced weekly (it is not worn during the fourth week of each cycle). Birth control pills must be taken every day. All of these methods are effective and can be stopped at any time.  Intrauterine device (IUD)  An IUD is a small, T-shaped device. It must be placed in the uterus by a trained healthcare provider. There are different types of IUDs available. They work by causing changes in the uterus that make it harder for sperm to reach the egg. Depending on the type of IUD you have, it may work for several years or longer. The IUD is a reversible birth control method. This means it can be removed at any time.  Condom  A condom is a sheath that forms a thin barrier between the penis and the vagina. It helps prevent pregnancy by keeping sperm from entering the vagina. When latex condoms are used, they have the added benefit of protecting against most STIs (sexually transmitted  infections). Condoms are used each time there is sexual intercourse and should be discarded after each use. Ask your healthcare provider about the different types of condoms available. These include both the male condom and female condom.  Spermicide  Spermicides come as foams, jellies, creams, suppositories, and tablets.  They help prevent pregnancy by killing sperm. When used alone they are not that reliable. They work best when combined with other birth control methods such as diaphragms and cervical caps.  Sponge, diaphragm, and cervical cap  All of these methods help prevent pregnancy by covering the opening of the uterus (cervix). This prevents sperm from passing through.  The sponge contains spermicide. It can be bought over the counter. The sponge must be left in place for at least 6 hours after the last time you have sex. However, it should not stay in place for more than 24 hours. It should be discarded after it is used.  The diaphragm and cervical cap must be fitted and prescribed by your healthcare provider. Both are used with spermicide. The diaphragm must be left in place for at least 6 hours after sex. However, it should not stay in place for more than 24 hours. It can be washed and reused. The cervical cap must be left in place for at least 6 hours after sex. However, it should not stay in place for more than 48 hours. It can be washed and reused.  Withdrawal method  This is when the man pulls his penis out of the vagina just before ejaculation ( coming ). This lowers the amount of sperm entering the vagina. Be aware that fluids released just before ejaculation often still contain some sperm, so this method is not as reliable as certain other methods.  Rhythm method  This method requires that you know when in your menstrual cycle you are likely to become pregnant. Then, you avoid sex during those days. This requires careful planning and good discipline. Your healthcare provider can explain more about  how this works.  Tubal ligation and vasectomy  These are surgical methods to prevent pregnancy. Tubal ligation is an option for women. The fallopian tubes are blocked or cut (ligated). This keeps the egg from passing into the uterus or sperm from reaching the egg. Vasectomy is an option for men. The tubes that normally carry sperm to the penis are either closed or blocked. Both tubal ligation and vasectomy are permanent birth control methods. This means reversal is either not possible or unlikely to work. They are good choices for women and men who know that they do not want to have children in the future.  Date Last Reviewed: 11/1/2017 2000-2018 [employer]. [address] [place] [address], [place] [address]. All rights reserved. This information is not intended as a substitute for professional medical care. Always follow your healthcare professional's instructions.

## 2019-01-10 LAB
C TRACH DNA SPEC QL NAA+PROBE: NEGATIVE
N GONORRHOEA DNA SPEC QL NAA+PROBE: NEGATIVE
SPECIMEN SOURCE: NORMAL
SPECIMEN SOURCE: NORMAL

## 2019-01-21 NOTE — TELEPHONE ENCOUNTER
RECORDS RECEIVED FROM: internal ( [employer] appt)   DATE RECEIVED: 1/21/19   NOTES STATUS DETAILS   OFFICE NOTE from referring provider N/A    OFFICE NOTE from other specialist N/A    DISCHARGE SUMMARY from hospital N/A    DISCHARGE REPORT from the ER N/A    OPERATIVE REPORT N/A    MEDICATION LIST Internal    IMPLANT RECORD/STICKER N/A    LABS     CBC/DIFF N/A    CULTURES N/A    INJECTIONS DONE IN RADIOLOGY N/A    MRI N/A    CT SCAN N/A    XRAYS (IMAGES & REPORTS) N/A    TUMOR     PATHOLOGY  Slides & report N/A    My left knee pops out of socket and I have to squat bounce to pop it back into place otherwise it hurts so bad I cannot walk  * not seen for this before per pt.

## 2019-01-22 DIAGNOSIS — M25.562 LEFT KNEE PAIN, UNSPECIFIED CHRONICITY: Primary | ICD-10-CM

## 2019-01-23 ENCOUNTER — PRE VISIT (OUTPATIENT)
Dept: ORTHOPEDICS | Facility: CLINIC | Age: 29
End: 2019-01-23

## 2019-02-18 PROBLEM — N92.6 IRREGULAR MENSTRUAL CYCLE: Status: ACTIVE | Noted: 2019-02-18

## 2019-03-08 ENCOUNTER — OFFICE VISIT (OUTPATIENT)
Dept: FAMILY MEDICINE | Facility: CLINIC | Age: 29
End: 2019-03-08
Payer: COMMERCIAL

## 2019-03-08 ENCOUNTER — MYC MEDICAL ADVICE (OUTPATIENT)
Dept: FAMILY MEDICINE | Facility: CLINIC | Age: 29
End: 2019-03-08

## 2019-03-08 VITALS
TEMPERATURE: 98 F | DIASTOLIC BLOOD PRESSURE: 85 MMHG | OXYGEN SATURATION: 97 % | RESPIRATION RATE: 16 BRPM | SYSTOLIC BLOOD PRESSURE: 130 MMHG | HEART RATE: 79 BPM

## 2019-03-08 DIAGNOSIS — N92.6 IRREGULAR MENSTRUAL CYCLE: ICD-10-CM

## 2019-03-08 DIAGNOSIS — Z83.3 FAMILY HISTORY OF DIABETES MELLITUS: ICD-10-CM

## 2019-03-08 DIAGNOSIS — J45.40 MODERATE PERSISTENT ASTHMA WITHOUT COMPLICATION: ICD-10-CM

## 2019-03-08 DIAGNOSIS — R00.2 PALPITATIONS: Primary | ICD-10-CM

## 2019-03-08 DIAGNOSIS — F43.20 ADJUSTMENT DISORDER, UNSPECIFIED TYPE: ICD-10-CM

## 2019-03-08 DIAGNOSIS — E66.01 MORBID OBESITY (H): ICD-10-CM

## 2019-03-08 DIAGNOSIS — J35.1 ENLARGED TONSILS: ICD-10-CM

## 2019-03-08 LAB
ALBUMIN SERPL-MCNC: 3.9 G/DL (ref 3.4–5)
ALP SERPL-CCNC: 64 U/L (ref 40–150)
ALT SERPL W P-5'-P-CCNC: 18 U/L (ref 0–50)
ANION GAP SERPL CALCULATED.3IONS-SCNC: 7 MMOL/L (ref 3–14)
AST SERPL W P-5'-P-CCNC: 15 U/L (ref 0–45)
BASOPHILS # BLD AUTO: 0 10E9/L (ref 0–0.2)
BASOPHILS NFR BLD AUTO: 0.3 %
BILIRUB SERPL-MCNC: 0.4 MG/DL (ref 0.2–1.3)
BUN SERPL-MCNC: 13 MG/DL (ref 7–30)
CALCIUM SERPL-MCNC: 9 MG/DL (ref 8.5–10.1)
CHLORIDE SERPL-SCNC: 109 MMOL/L (ref 94–109)
CO2 SERPL-SCNC: 23 MMOL/L (ref 20–32)
CREAT SERPL-MCNC: 0.76 MG/DL (ref 0.52–1.04)
DIFFERENTIAL METHOD BLD: NORMAL
EOSINOPHIL # BLD AUTO: 0.3 10E9/L (ref 0–0.7)
EOSINOPHIL NFR BLD AUTO: 4 %
ERYTHROCYTE [DISTWIDTH] IN BLOOD BY AUTOMATED COUNT: 15 % (ref 10–15)
FERRITIN SERPL-MCNC: 16 NG/ML (ref 12–150)
GFR SERPL CREATININE-BSD FRML MDRD: >90 ML/MIN/{1.73_M2}
GLUCOSE SERPL-MCNC: 90 MG/DL (ref 70–99)
HBA1C MFR BLD: 5.3 % (ref 0–5.6)
HCG UR QL: NEGATIVE
HCT VFR BLD AUTO: 40.4 % (ref 35–47)
HGB BLD-MCNC: 13 G/DL (ref 11.7–15.7)
IRON SERPL-MCNC: 73 UG/DL (ref 35–180)
LYMPHOCYTES # BLD AUTO: 2.1 10E9/L (ref 0.8–5.3)
LYMPHOCYTES NFR BLD AUTO: 28 %
MCH RBC QN AUTO: 26.5 PG (ref 26.5–33)
MCHC RBC AUTO-ENTMCNC: 32.2 G/DL (ref 31.5–36.5)
MCV RBC AUTO: 82 FL (ref 78–100)
MONOCYTES # BLD AUTO: 0.5 10E9/L (ref 0–1.3)
MONOCYTES NFR BLD AUTO: 7.1 %
NEUTROPHILS # BLD AUTO: 4.5 10E9/L (ref 1.6–8.3)
NEUTROPHILS NFR BLD AUTO: 60.6 %
PLATELET # BLD AUTO: 180 10E9/L (ref 150–450)
POTASSIUM SERPL-SCNC: 4 MMOL/L (ref 3.4–5.3)
PROT SERPL-MCNC: 8 G/DL (ref 6.8–8.8)
RBC # BLD AUTO: 4.9 10E12/L (ref 3.8–5.2)
SODIUM SERPL-SCNC: 139 MMOL/L (ref 133–144)
TSH SERPL DL<=0.005 MIU/L-ACNC: 0.58 MU/L (ref 0.4–4)
WBC # BLD AUTO: 7.5 10E9/L (ref 4–11)

## 2019-03-08 PROCEDURE — 80053 COMPREHEN METABOLIC PANEL: CPT | Performed by: FAMILY MEDICINE

## 2019-03-08 PROCEDURE — 99214 OFFICE O/P EST MOD 30 MIN: CPT | Performed by: FAMILY MEDICINE

## 2019-03-08 PROCEDURE — 93000 ELECTROCARDIOGRAM COMPLETE: CPT | Performed by: FAMILY MEDICINE

## 2019-03-08 PROCEDURE — 36415 COLL VENOUS BLD VENIPUNCTURE: CPT | Performed by: FAMILY MEDICINE

## 2019-03-08 PROCEDURE — 85025 COMPLETE CBC W/AUTO DIFF WBC: CPT | Performed by: FAMILY MEDICINE

## 2019-03-08 PROCEDURE — 81025 URINE PREGNANCY TEST: CPT | Performed by: FAMILY MEDICINE

## 2019-03-08 PROCEDURE — 84443 ASSAY THYROID STIM HORMONE: CPT | Performed by: FAMILY MEDICINE

## 2019-03-08 PROCEDURE — 83540 ASSAY OF IRON: CPT | Performed by: FAMILY MEDICINE

## 2019-03-08 PROCEDURE — 82728 ASSAY OF FERRITIN: CPT | Performed by: FAMILY MEDICINE

## 2019-03-08 PROCEDURE — 83036 HEMOGLOBIN GLYCOSYLATED A1C: CPT | Performed by: FAMILY MEDICINE

## 2019-03-08 ASSESSMENT — ANXIETY QUESTIONNAIRES
IF YOU CHECKED OFF ANY PROBLEMS ON THIS QUESTIONNAIRE, HOW DIFFICULT HAVE THESE PROBLEMS MADE IT FOR YOU TO DO YOUR WORK, TAKE CARE OF THINGS AT HOME, OR GET ALONG WITH OTHER PEOPLE: NOT DIFFICULT AT ALL
2. NOT BEING ABLE TO STOP OR CONTROL WORRYING: SEVERAL DAYS
1. FEELING NERVOUS, ANXIOUS, OR ON EDGE: SEVERAL DAYS
6. BECOMING EASILY ANNOYED OR IRRITABLE: MORE THAN HALF THE DAYS
5. BEING SO RESTLESS THAT IT IS HARD TO SIT STILL: NOT AT ALL
3. WORRYING TOO MUCH ABOUT DIFFERENT THINGS: SEVERAL DAYS
7. FEELING AFRAID AS IF SOMETHING AWFUL MIGHT HAPPEN: NEARLY EVERY DAY
GAD7 TOTAL SCORE: 9

## 2019-03-08 ASSESSMENT — PATIENT HEALTH QUESTIONNAIRE - PHQ9
5. POOR APPETITE OR OVEREATING: SEVERAL DAYS
SUM OF ALL RESPONSES TO PHQ QUESTIONS 1-9: 11

## 2019-03-08 NOTE — PATIENT INSTRUCTIONS
Suspect anxiety and weight playing a role  Continue lower dose advair if working  Work on lifestyle  Labs today   Meet with psychology for diagnostic testing, counseling and then psyche if needs meds regarding concern about family hx of bipolar  EKg and labs today   Meet with cardiology   Do holter   Meet with bariatrics  consider sleep in future

## 2019-03-08 NOTE — RESULT ENCOUNTER NOTE
Lyle Johnson,  Graeme of your results came back and are within acceptable limits. -Normal red blood cell (hgb) levels, normal white blood cell count and normal platelet levels.. If you have any further concerns please do not hesitate to contact us by message, phone or making an appointment.  Have a good day   Dr Pierce XAVIER

## 2019-03-08 NOTE — PROGRESS NOTES
SUBJECTIVE:   Yael Johnson is a 28 year old female who presents to clinic today for the following health issues:  Here with mother     Palpitations -    Onset: 3 month(S) ago     Description:                  How long do the palpitations last: 1 day-3 hrs                 What are you doing when you notice them: laying down   Have you passed out: no      Accompanying Signs & Symptoms:   Chest pain: no  Shortness of breath: YES  Lightheadedness: no  Excessive sweating (Diaphoresis): no  Fatigue: no        Nausea: no        Changes in weight: YES    Precipitating and/or Alleviating factors:    Increase in stress level: no  Any new medications: YES- inhaler? At 1 time pt was using father's inhaler  Caffeine use: YES- little   Alcohol/drug use/withdrawal: no    History:         Personal history of heart problems: no         Family history of heart problems: no    Therapies tried and outcome: try not to think about it with minor relief     DECLINE FLU VACCINE       Hx of morbid obesity, enlarged tonsils, moderate persistent asthma on albuterol inhaler & neb prn & Advair 100/50 bid, FH of diabetes, prior left breast biopsy that was benign & still had  a lump in , with hx of irregular menstrual cycle no longer on nuvaring, allergic to cats and dogs, seen by Wiley 5/3/18 & seen by me first time on 18 for vaginal odor & treated for BV with Metrogel. Std screen was negative. Had left knee pain, exam was benign & advised quad strengthening , weight loss & referred to ENT for enlarged tonsils. Cbc, CMP, lipids, TSH, hep B,C, HIV , syphilis & GC were negative. Seen 19 by Beto for sore throat , no infection seen. GC oral was negative. Was given nuvaring,  Advair dose increased but not started, remains stable on prior lower dosing. [place]  negative.     Here for palpitations aware of it since in the middle of 2018. Her brother in law  of a heart attack  & that day was when she started having those  symptoms. So thought maybe was anxiety initially then worried about dying from a heart attack too as is over weight. When first started felt palpitations lasted couple days and now lasts  few hours, doesnt occur as frequently now. Maybe will notice it weekly. Usually notes it when she is lying down at night. When at work she does not feel it. Neither does she notice it when she is walking to the bus unless has to run. Usually when occurs at night she says a prayer and goes to bed and next am when she gets up she is fine. With th palpitations she has no associated symptoms but if thinks about it at most may feel a little short of breath but not much     She also notes her Mom is concerned about her having bipolar disorder. She & her friends do not think she ha it but her Mom has a certain percentage of bipolar & feels she might have it too. Her mother had Adult ADHD too. Notes periods of increased increased sexuality but no periods of getting by with no to less sleep & denies any visual or auditory. Not currently sexually active hallucinations    Asthma stable on Advair 100/50 & and albuterol  Prn. Used dads Symbicort inhaler in nov when didn't have insurance  & wondering if that contributed to palpitations, uses Albuterol neb only if running out of med     Has had no fever or chills, no headache or dizziness, no double or blurry vision, wear contacts, no facial pain, earache, sore throat, runny nose, post nasal drip, no trouble hearing, smelling, tasting or swallowing, no cough , no chest pain, trouble breathing No abdominal pain, heart burn, reflux, nausea or vomiting or diarrhea or constipation, no blood in stools or black stools, no weight loss or night sweats. No dysuria, hematuria, frequency, urgency, hesitancy, incontinence, No pelvic complaints. No leg swelling or joint pain. No rash.    PHQ-9 ([employer]) 3/8/2019   1.  Little interest or pleasure in doing things 0   2.  Feeling down, depressed, or hopeless 1    3.  Trouble falling or staying asleep, or sleeping too much 0   4.  Feeling tired or having little energy 3   5.  Poor appetite or overeating 3   6.  Feeling bad about yourself 2   7.  Trouble concentrating 1   8.  Moving slowly or restless 1   9.  Suicidal or self-harm thoughts 0   PHQ-9 Total Score 11   Difficulty at work, home, or with people Not difficult at all   JAYE-7   [employer], 2002; Used with Permission) 3/8/2019   1. Feeling nervous, anxious, or on edge 1   2. Not being able to stop or control worrying 1   3. Worrying too much about different things 1   4. Trouble relaxing 1   5. Being so restless that it is hard to sit still 0   6. Becoming easily annoyed or irritable 2   7. Feeling afraid, as if something awful might happen 3   JAYE-7 Total Score 9   If you checked any problems, how difficult have they made it for you to do your work, take care of things at home, or get along with other people? Not difficult at all     Problem list and histories reviewed & adjusted, as indicated.  Additional history: as documented    Patient Active Problem List   Diagnosis     Family history of diabetes mellitus     Enlarged tonsils     Moderate persistent asthma     Morbid obesity (H)     Irregular menstrual cycle     Past Surgical History:   Procedure Laterality Date     US BREAST BIOPSY LT  2017    benign, still has a lump       Social History     Tobacco Use     Smoking status: Never Smoker     Smokeless tobacco: Never Used   Substance Use Topics     Alcohol use: Yes     Comment: OCC     Family History   Problem Relation Age of Onset     Hypertension Mother      Diabetes Mother         type 2     Arthritis Mother      Bipolar Disorder Mother         per mom     Diabetes Maternal Grandmother         type 2     Cardiovascular Paternal Grandfather         Lung         Current Outpatient Medications   Medication Sig Dispense Refill     ADVAIR DISKUS 100-50 MCG/DOSE inhaler INHALE 1 PUFF INTO THE LUNGS EVERY 12 HOURS 1  Inhaler 0     albuterol (2.5 MG/3ML) 0.083% nebulizer solution Take 1 vial (2.5 mg) by nebulization every 6 hours as needed for shortness of breath / dyspnea 1 Box 11     albuterol (PROAIR HFA/PROVENTIL HFA/VENTOLIN HFA) 108 (90 Base) MCG/ACT Inhaler Inhale 2 puffs into the lungs every 6 hours as needed for shortness of breath / dyspnea or wheezing 1 Inhaler 1     Allergies   Allergen Reactions     Cats      Dogs      Recent Labs   Lab Test 03/08/19  1232 05/31/18  1458  05/09/16  1606   A1C 5.3 5.5  --   --    LDL  --  52  --  70   HDL  --  29*  --  30*   TRIG  --  73  --  69   ALT 18 22  --  20   CR 0.76 0.73  --  1.02   GFRESTIMATED >90 >90  --  67   GFRESTBLACK >90 >90  --  81   POTASSIUM 4.0 3.5  --  3.8   TSH 0.58 0.55   < > 0.82    < > = values in this interval not displayed.      BP Readings from Last 3 Encounters:   03/08/19 130/85   01/09/19 108/64   05/31/18 124/67    Wt Readings from Last 3 Encounters:   01/09/19 115.2 kg (254 lb)   05/31/18 110 kg (242 lb 8 oz)   10/07/16 109.9 kg (242 lb 4.8 oz)                  Labs reviewed in EPIC    Reviewed and updated as needed this visit by clinical staff       Reviewed and updated as needed this visit by Provider         ROS:  Constitutional, HEENT, cardiovascular, pulmonary, GI, , musculoskeletal, neuro, skin, endocrine and psych systems are negative, except as otherwise noted.    OBJECTIVE:     /85 (BP Location: Left arm, Patient Position: Chair, Cuff Size: Adult Large)   Pulse 79   Temp 98  F (36.7  C) (Oral)   Resp 16   LMP 02/19/2019 (Exact Date)   SpO2 97%   There is no height or weight on file to calculate BMI.  GENERAL: alert, no distress and obese  EYES: Eyes grossly normal to inspection, PERRL and conjunctivae and sclerae normal  HENT: ear canals and TM's normal, nose and mouth without ulcers or lesions  NECK: no adenopathy, no asymmetry, masses, or scars and thyroid normal to palpation  RESP: lungs clear to auscultation - no rales,  rhonchi or wheezes  CV: regular rate and rhythm, normal S1 S2, no S3 or S4, no murmur, click or rub, no peripheral edema and peripheral pulses strong  ABDOMEN: soft, non tender, no hepatosplenomegaly, no masses and bowel sounds normal  MS: no gross musculoskeletal defects noted, no edema  SKIN: no suspicious lesions or rashes  NEURO: Normal strength and tone, mentation intact and speech normal  PSYCH: mentation appears normal, affect normal/bright    Diagnostic Test Results:  No results found for this or any previous visit (from the past 24 hour(s)).    ASSESSMENT/PLAN:     1. Palpitations  Currently. Started day brother in law  in 2018. Exam benign. No red flag symptom or sign. EKG NSR. Suspect anxiety and weight playing a role. Asthma well controlled to continue lower dosing of Advair if working. To work on lifestyle. Labs today to evaluate other causes. Referred to psychology for diagnostic testing, counseling and then psyche if needs meds regarding concern about family hx of bipolar & ADHD. Referred to cardiology, Holter ordered.   Meet with bariatrics. Consider seeing sleep in future given obesity & possible hx of snoring. Irregular menstrual cycle could be later to weight& stress. See gyn as planned 3/30 & do PAP with them as due this year.   - CBC with platelets differential  - Comprehensive metabolic panel  - TSH with free T4 reflex  - Iron  - Ferritin  - Zio Patch Holter 48 Hour Adult Pediatric; Future  - SLEEP EVALUATION & MANAGEMENT REFERRAL - ADULT [facility]  [phone] (Age 2 and up); Future  - CARDIO  ADULT REFERRAL  - EKG 12-lead complete w/read - Clinics    2. Morbid obesity (H)  Weight loss, diet & exercise advised. See bariatrics. Consider sleep eval. Wants to wait on latter for now.   - TSH with free T4 reflex  - Hemoglobin A1c  - SLEEP EVALUATION & MANAGEMENT REFERRAL - ADULT [facility]  [phone] (Age 2 and up); Future  - BARIATRIC ADULT REFERRAL    3. Irregular menstrual cycle  Could be later to weight& stress. See gyn as planned 3/30 & do PAP with them as due this year.   - CBC with platelets differential  - TSH with free T4 reflex  - Iron  - Ferritin  - HCG Qual, Urine ([id number])    4. Family history of diabetes mellitus  - Hemoglobin A1c    5. Moderate persistent asthma without complication  Asthma well controlled to continue lower dosing of Advair if working. On Advair 100/50 bid, albuterol prn. Given asthma action plan.   - CBC with platelets differential    6. Enlarged tonsils  Asymptomatic. Possible snoring. Told in past. Is obese. Possible undiagnosed sleep apnea  - CBC with platelets differential  - SLEEP EVALUATION & MANAGEMENT REFERRAL - ADULT -[facility]  [phone] (Age 2 and up); Future    7. Adjustment disorder, unspecified type  Mom has bipolar  Thinks she ha it too. Does not meet all criteria. Referred to psychology for diagnostic testing, counseling and then psyche if needs meds regarding concern about family hx of bipolar & ADHD.  - MENTAL HEALTH REFERRAL  - Adult; Outpatient Treatment, Assessments and Testing, Psychiatry and Medication Management; General Psychological Testing; FMG: [facility] [phone]; Individual/Couples/Family/Group Therapy/Health Ps...    Work on weight loss  Regular exercise  See Patient Instructions    Angela Pelayo MD  [facility]

## 2019-03-08 NOTE — RESULT ENCOUNTER NOTE
Lyle Ms. Johnson,  Your results came back negative for diabetes & negative for pregnancy . If you have any further concerns please do not hesitate to contact us by message, phone or making an appointment.  Have a good day   Dr Pierce XAVIER

## 2019-03-09 ASSESSMENT — ANXIETY QUESTIONNAIRES: GAD7 TOTAL SCORE: 9

## 2019-03-09 ASSESSMENT — ASTHMA QUESTIONNAIRES: ACT_TOTALSCORE: 23

## 2019-03-09 NOTE — RESULT ENCOUNTER NOTE
Lyle Ms. Johnson,  Your results came back and are within acceptable limits. -Liver and gallbladder tests are normal (ALT,AST, Alk phos, bilirubin), kidney function is normal (Cr, GFR), sodium is normal, potassium is normal, calcium is normal, glucose is normal.  -TSH (thyroid stimulating hormone) level is normal which indicates normal thyroid function.  - iron level is normal but iron store levels (ferritin) on lower end.  ADVISE: increasing iron in your diet and consider taking iron supplement for 2 months (ferrous gluconate 325 mg twice daily - to avoid constipation from the supplement you should increase fluid intake and fiber in your diet)  Also, recheck your labs in 2 months. If you have any further concerns please do not hesitate to contact us by message, phone or making an appointment.  Have a good day   Dr Pierce XAVIER

## 2019-03-14 ENCOUNTER — ANCILLARY PROCEDURE (OUTPATIENT)
Dept: CARDIOLOGY | Facility: CLINIC | Age: 29
End: 2019-03-14
Attending: FAMILY MEDICINE
Payer: COMMERCIAL

## 2019-03-14 DIAGNOSIS — R00.2 PALPITATIONS: ICD-10-CM

## 2019-03-14 DIAGNOSIS — M25.562 LEFT KNEE PAIN, UNSPECIFIED CHRONICITY: ICD-10-CM

## 2019-03-14 PROCEDURE — 93225 XTRNL ECG REC<48 HRS REC: CPT | Mod: ZF

## 2019-03-14 NOTE — LETTER
March 27, 2019      Yael Johnson  [address]  [place] [address]        Dear ,    We are writing to inform you of your test results.    The heart monitor showed no alarming rhythm or pauses & was essentially normal       If you have any questions or concerns, please call the clinic at the number listed above.       Sincerely,    Angela Pelayo MD/nr

## 2019-03-14 NOTE — PROGRESS NOTES
Per Dr. Miller, patient to have 48 hour Zio monitor placed.  Diagnosis: Palpitations  Monitor placed: Yes  Patient Instructed: Yes  Patient verbalized understanding: Yes  Holter # [id number]  Placed by Bharat Garza

## 2019-03-20 ENCOUNTER — OFFICE VISIT (OUTPATIENT)
Dept: OBGYN | Facility: CLINIC | Age: 29
End: 2019-03-20
Payer: COMMERCIAL

## 2019-03-20 VITALS
BODY MASS INDEX: 43.49 KG/M2 | DIASTOLIC BLOOD PRESSURE: 80 MMHG | SYSTOLIC BLOOD PRESSURE: 126 MMHG | WEIGHT: 261 LBS | HEART RATE: 99 BPM | HEIGHT: 65 IN

## 2019-03-20 DIAGNOSIS — N92.6 IRREGULAR MENSES: Primary | ICD-10-CM

## 2019-03-20 DIAGNOSIS — N76.0 BV (BACTERIAL VAGINOSIS): ICD-10-CM

## 2019-03-20 DIAGNOSIS — B96.89 BV (BACTERIAL VAGINOSIS): ICD-10-CM

## 2019-03-20 DIAGNOSIS — N89.8 VAGINAL DISCHARGE: ICD-10-CM

## 2019-03-20 LAB
HCG UR QL: NEGATIVE
PROGEST SERPL-MCNC: 6.5 NG/ML

## 2019-03-20 PROCEDURE — 36415 COLL VENOUS BLD VENIPUNCTURE: CPT | Performed by: OBSTETRICS & GYNECOLOGY

## 2019-03-20 PROCEDURE — 99203 OFFICE O/P NEW LOW 30 MIN: CPT | Performed by: OBSTETRICS & GYNECOLOGY

## 2019-03-20 PROCEDURE — 81025 URINE PREGNANCY TEST: CPT | Performed by: OBSTETRICS & GYNECOLOGY

## 2019-03-20 PROCEDURE — 84144 ASSAY OF PROGESTERONE: CPT | Performed by: OBSTETRICS & GYNECOLOGY

## 2019-03-20 ASSESSMENT — MIFFLIN-ST. JEOR: SCORE: 1916.83

## 2019-03-20 NOTE — PROGRESS NOTES
"SUBJECTIVE:  Yael Johnson is a 28 year old  who presents to evaluate irregular menses.  Periods in the past have been very regular, but beginning in 2018 menstrual periods are further apart.  She also had an episode of brown discharge for about a week and 1:30..  She currently is not sexually active and does not need contraception, does hope to become pregnant in the future.  LMP was  - 2019.  She also notes that she has had bacterial vaginosis recurrences about every 2 months.  She did gain about 40 pounds of weight between  and .  [facility] past [facility] but is not sure whether her cervix was examined.    OBJECTIVE: /80   Pulse 99   Ht 1.638 m (5' 4.5\")   Wt 118.4 kg (261 lb)   LMP 2019 (Exact Date)   PF 97 L/min   BMI 44.11 kg/m       Pelvic exam:  External genitalia appeared normal without lesion.  Urethral meatus, urethra, bladder, perineum, and anus appeared normal. Cervix and vagina appeared normal, without lesion.          ASSESSMENT: Irregular menses, likely oligo ovulatory.  Recurrent BV.    PLAN: We discussed the above.  We discussed methods to try to evaluate for ovulation.  We discussed option of ovulation induction when pregnancy is desired if she is anovulatory.  We discussed PCOS.  We discussed possibility of oral contraceptive pills to regulate menses if pregnancy is not immediately desired.  She will have a serum progesterone level drawn today and call for the results.  We discussed the possible treatment options for recurrent BV, including suppressive treatment.  After the appointment she mentioned to the office personnel that she thought she might have BV again, and she will come in for a self collect wet prep.    Please note greater than 50% of this 30 minute appointment were spent in counseling with the patient of the issues described above in the history of present illness and in the plan, " including evaluation and managment of irregular menses, recurrent BV.

## 2019-04-11 PROBLEM — F43.20 ADJUSTMENT DISORDER, UNSPECIFIED TYPE: Status: ACTIVE | Noted: 2019-04-11

## 2019-04-11 PROBLEM — R79.0 LOW FERRITIN: Status: ACTIVE | Noted: 2019-04-11

## 2019-04-11 PROBLEM — R00.2 PALPITATIONS: Status: ACTIVE | Noted: 2019-04-11

## 2019-04-17 ENCOUNTER — OFFICE VISIT (OUTPATIENT)
Dept: CARDIOLOGY | Facility: CLINIC | Age: 29
End: 2019-04-17
Attending: FAMILY MEDICINE
Payer: COMMERCIAL

## 2019-04-17 VITALS
OXYGEN SATURATION: 96 % | DIASTOLIC BLOOD PRESSURE: 84 MMHG | SYSTOLIC BLOOD PRESSURE: 127 MMHG | HEIGHT: 65 IN | BODY MASS INDEX: 44.45 KG/M2 | HEART RATE: 102 BPM | WEIGHT: 266.8 LBS

## 2019-04-17 DIAGNOSIS — R00.2 PALPITATIONS: ICD-10-CM

## 2019-04-17 PROCEDURE — G0463 HOSPITAL OUTPT CLINIC VISIT: HCPCS | Mod: ZF

## 2019-04-17 PROCEDURE — 99203 OFFICE O/P NEW LOW 30 MIN: CPT | Mod: ZP | Performed by: INTERNAL MEDICINE

## 2019-04-17 ASSESSMENT — MIFFLIN-ST. JEOR: SCORE: 1951.08

## 2019-04-17 ASSESSMENT — PAIN SCALES - GENERAL: PAINLEVEL: NO PAIN (0)

## 2019-04-17 NOTE — LETTER
2019       RE: Yael Johnson  [address]  [place] [address]     Dear Colleague,    Thank you for referring your patient, Yael Johnson, to the [facility] at [facility]. Please see a copy of my visit note below.        CARDIOLOGY CONSULT    PCP: Angela Pelayo MD   Consulting provider: Angela Pelayo MD     HPI: Yael Johnson is a 28 year old female being seen today for evaluation of palpitations.  Her palpitations started about 2-1/2 months ago following  for a relative who was overweight. This was her brother in law and he  from heart attack. She will noticed her irregular heart rhythm when she is in bed, and primarily when she is laying on her stomach. No history of loss of consciousness chest discomfort or shortness of breath on exertion. Prior history of asthma but no recent change in exertional status. Able to walk 2 flight of stairs (FOS) without stopping at normal or fast pace.   Seen by Dr Pelayo on 3/8/19 and received Z[employer] monitor on 3/14/19 that did not show any events.   The patient denies a history of chest discomfort, dyspnea, PND (paroxysmal nocturnal dyspnea), orthopnea, pedal edema, lightheadedness and syncope.      The patient's risk factor profile is: (-) HTN, (-) DM, (-) hypercholesterolemia, (-) tobacco use, (-) fam Hx premature CAD.   The patient has no history of cardiovascular disease (CAD, CHF, arrhythmia, valvular heart disease).  The patient has no Hx of PAD or cerebrovascular disease.  The patient has not undergone prior cardiovascular evaluation and has never had an ECHO, stress study, cardiac catheterization, or EP study.     The ASCVD Risk score (Rekha DC Jr., et al., 2013) failed to calculate for the following reasons:    The 2013 ASCVD risk score is only valid for ages 40 to 79    PAST MEDICAL HISTORY:  Past Medical History:   Diagnosis Date     Asthma      Enlarged tonsils        CURRENT  MEDICATIONS:  Current Outpatient Medications   Medication Sig Dispense Refill     ADVAIR DISKUS 100-50 MCG/DOSE inhaler INHALE 1 PUFF INTO THE LUNGS EVERY 12 HOURS 1 Inhaler 0     albuterol (2.5 MG/3ML) 0.083% nebulizer solution Take 1 vial (2.5 mg) by nebulization every 6 hours as needed for shortness of breath / dyspnea 1 Box 11     albuterol (PROAIR HFA/PROVENTIL HFA/VENTOLIN HFA) 108 (90 Base) MCG/ACT Inhaler Inhale 2 puffs into the lungs every 6 hours as needed for shortness of breath / dyspnea or wheezing 1 Inhaler 1       PAST SURGICAL HISTORY:  Past Surgical History:   Procedure Laterality Date     US BREAST BIOPSY LT  2017    benign, still has a lump       ALLERGIES  Cats and Dogs    FAMILY HX:  Family History   Problem Relation Age of Onset     Hypertension Mother      Diabetes Mother         type 2     Arthritis Mother      Bipolar Disorder Mother         per mom     Diabetes Maternal Grandmother         type 2     Cardiovascular Paternal Grandfather         Lung       SOCIAL HX:  Social History     Socioeconomic History     Marital status: Single     Spouse name: Not on file     Number of children: Not on file     Years of education: Not on file     Highest education level: Not on file   Occupational History     Not on file   Social Needs     Financial resource strain: Not on file     Food insecurity:     Worry: Not on file     Inability: Not on file     Transportation needs:     Medical: Not on file     Non-medical: Not on file   Tobacco Use     Smoking status: Never Smoker     Smokeless tobacco: Never Used   Substance and Sexual Activity     Alcohol use: Yes     Comment: OCC     Drug use: No     Sexual activity: Yes     Partners: Male   Lifestyle     Physical activity:     Days per week: Not on file     Minutes per session: Not on file     Stress: Not on file   Relationships     Social connections:     Talks on phone: Not on file     Gets together: Not on file     Attends Caodaism service: Not on file  "    Active member of club or organization: Not on file     Attends meetings of clubs or organizations: Not on file     Relationship status: Not on file     Intimate partner violence:     Fear of current or ex partner: Not on file     Emotionally abused: Not on file     Physically abused: Not on file     Forced sexual activity: Not on file   Other Topics Concern     Parent/sibling w/ CABG, MI or angioplasty before 65F 55M? Not Asked   Social History Narrative    2019: lives with room mates, independnatly, no pets         Care giver, sitting job, lives with family,        ROS:  Constitutional: No recent fever, chills, or sweats. No significant weight gain/loss.   ENT: No epistaxis.  Allergies/Immunologic: As above.   Respiratory: No hemoptysis.   Cardiovascular: As per HPI.   GI: No hematemesis, melena, or hematochezia.   : No hematuria.   Skin: No petechia or ecchymosis.   Endocrinology: Negative for diabetes   Musculoskeletal: No myalgia.    VITAL SIGNS:  /84 (BP Location: Right arm, Patient Position: Chair, Cuff Size: Adult Large)   Pulse 102   Ht 1.651 m (5' 5\")   Wt 121 kg (266 lb 12.8 oz)   SpO2 96%   BMI 44.40 kg/m       Body mass index is 44.4 kg/m .  Wt Readings from Last 2 Encounters:   04/17/19 121 kg (266 lb 12.8 oz)   03/20/19 118.4 kg (261 lb)       PHYSICAL EXAM  Yael Johnson is a 28 year old female in no acute distress.  HEENT: Unremarkable.  Neck: JVP normal.  Carotids bilaterally without bruits.  Lungs: CTA.  Cor: RRR. Normal S1 and S2.  No murmur, rub, or gallop.   Abd: Soft, nontender, nondistended.  NABS.  No pulsatile mass.  Extremities: No C/C/E.  Pulses symmetric in upper and lower extremities.  Neuro: Grossly intact.    LABS    Lab Results   Component Value Date    WBC 7.5 03/08/2019     Lab Results   Component Value Date    RBC 4.90 03/08/2019     Lab Results   Component Value Date    HGB 13.0 03/08/2019     Lab Results   Component Value Date    HCT 40.4 03/08/2019     No " components found for: MCT  Lab Results   Component Value Date    MCV 82 03/08/2019     Lab Results   Component Value Date    MCH 26.5 03/08/2019     Lab Results   Component Value Date    MCHC 32.2 03/08/2019     Lab Results   Component Value Date    RDW 15.0 03/08/2019     Lab Results   Component Value Date     03/08/2019      Recent Labs   Lab Test 03/08/19  1232 05/31/18  1458    139   POTASSIUM 4.0 3.5   CHLORIDE 109 106   CO2 23 24   ANIONGAP 7 9   GLC 90 97   BUN 13 11   CR 0.76 0.73   RODNEY 9.0 8.7     Recent Labs   Lab Test 05/31/18  1458 05/09/16  1606   CHOL 96 114   HDL 29* 30*   LDL 52 70   TRIG 73 69   NHDL 67 84        EKG:  3/8/19 with sinus rhythm 68 bpm. Anteroseptal Q wave in V1-2 that are somewhat non-specific.     ECHO: Never    STRESS TEST:  Never    CARDIAC CATH:  Never    CXR:  2015 with normal or small heart size. Images reviewed with patient.     Event monitoring: [employer] 3/14/19 for 47 hours without atrial fibrillation or other major arrhythmias. Ectopic burden less than 1%        ASSESSMENT AND PLAN:    1. Palpitations      Palpitations only at rest while in bed and not related to exertion.  Able to walk 2 flights of stairs without difficulty.  Normal size heart on chest x-ray and normal cardiovascular exam. 47 hour monitoring without significant arrhythmia.   Patient worried that her weight puts her at risk for cardiovascular events including heart attack.  Not on contraception and she does not smoke. Advised that increased weight alone not strong risk factor and while she eats healthy, avoids smoking, avoids further weight gain and observers periodically her blood pressure she will be at low risk. Did not discuss risk for diabetes that could later put her for risk of heart disease.     Spent time discussing her EKG and 24-hour monitor results.  Patient reassured about her cardiovascular risk and advised to exercise 150 minutes/week and avoid further weight gain.    Follow up:  As needed based on symptoms.    Clinic nurse:  Pablo Ross LPN   Nurse Care Coordinator- Heart Care   [phone] option 1, than option 3    Academic Mailing address:  [facility]  Department of Internal Medicine ([facility] [address])  [address] [place] [id number]    Med Reconcile: Reviewed and verified all current medications with the patient. The updated medication list was printed and given to the patient.  Patient stated she understood all health information given and agreed to call with further questions or concerns.      Again, thank you for allowing me to participate in the care of your patient.      Sincerely,    Princess Jeffrey MD

## 2019-04-17 NOTE — PATIENT INSTRUCTIONS
"You were seen today in the Cardiovascular Clinic at the [facility].     Cardiology Providers you saw during your visit: Dr. Jeffrey     Diagnosis:   Encounter Diagnosis   Name Primary?     Palpitations         Results: Discussed with you today EKG/Event monitor      Orders:   No orders of the defined types were placed in this encounter.      Current Medication List  Current Outpatient Medications   Medication Sig Dispense Refill     ADVAIR DISKUS 100-50 MCG/DOSE inhaler INHALE 1 PUFF INTO THE LUNGS EVERY 12 HOURS 1 Inhaler 0     albuterol (2.5 MG/3ML) 0.083% nebulizer solution Take 1 vial (2.5 mg) by nebulization every 6 hours as needed for shortness of breath / dyspnea 1 Box 11     albuterol (PROAIR HFA/PROVENTIL HFA/VENTOLIN HFA) 108 (90 Base) MCG/ACT Inhaler Inhale 2 puffs into the lungs every 6 hours as needed for shortness of breath / dyspnea or wheezing 1 Inhaler 1         Medications Discontinued:  There are no discontinued medications.      Recommendations:   1. No further testing needed at this time    Follow-up: As needed         Please feel free to call me with any questions or concerns.       Pablo Ross LPN      Questions: [phone].   First press #1 for the [facility] and then press #3 for \"Medical Questions\" to reach us Cardiology Nurses.     [phone].   First press #1 for the University and then press #1      On Call Cardiologist for after hours or on weekends: [phone]   option #4 and ask to speak to the on-call Cardiologist.          If you need a medication refill please contact your pharmacy.  Please allow 3 business days for your refill to be completed.  ________________________________________________________________________________________________________________________________        "

## 2019-04-17 NOTE — LETTER
2019      RE: Yael Johnson  [address]  [place] [address]       Dear Colleague,    Thank you for the opportunity to participate in the care of your patient, Yael Johnson, at the [facility]. Please see a copy of my visit note below.        CARDIOLOGY CONSULT    PCP: Angela Pelayo MD   Consulting provider: Angela Pelayo MD     HPI: Yael Johnson is a 28 year old female being seen today for evaluation of palpitations.  Her palpitations started about 2-1/2 months ago following  for a relative who was overweight. This was her brother in law and he  from heart attack. She will noticed her irregular heart rhythm when she is in bed, and primarily when she is laying on her stomach. No history of loss of consciousness chest discomfort or shortness of breath on exertion. Prior history of asthma but no recent change in exertional status. Able to walk 2 flight of stairs (FOS) without stopping at normal or fast pace.   Seen by Dr Pelayo on 3/8/19 and received Ziopatch monitor on 3/14/19 that did not show any events.   The patient denies a history of chest discomfort, dyspnea, PND (paroxysmal nocturnal dyspnea), orthopnea, pedal edema, lightheadedness and syncope.      The patient's risk factor profile is: (-) HTN, (-) DM, (-) hypercholesterolemia, (-) tobacco use, (-) fam Hx premature CAD.   The patient has no history of cardiovascular disease (CAD, CHF, arrhythmia, valvular heart disease).  The patient has no Hx of PAD or cerebrovascular disease.  The patient has not undergone prior cardiovascular evaluation and has never had an ECHO, stress study, cardiac catheterization, or EP study.     The ASCVD Risk score (Rekha DC Jr., et al., 2013) failed to calculate for the following reasons:    The 2013 ASCVD risk score is only valid for ages 40 to 79    PAST MEDICAL HISTORY:  Past Medical History:   Diagnosis Date     Asthma       Enlarged tonsils        CURRENT MEDICATIONS:  Current Outpatient Medications   Medication Sig Dispense Refill     ADVAIR DISKUS 100-50 MCG/DOSE inhaler INHALE 1 PUFF INTO THE LUNGS EVERY 12 HOURS 1 Inhaler 0     albuterol (2.5 MG/3ML) 0.083% nebulizer solution Take 1 vial (2.5 mg) by nebulization every 6 hours as needed for shortness of breath / dyspnea 1 Box 11     albuterol (PROAIR HFA/PROVENTIL HFA/VENTOLIN HFA) 108 (90 Base) MCG/ACT Inhaler Inhale 2 puffs into the lungs every 6 hours as needed for shortness of breath / dyspnea or wheezing 1 Inhaler 1       PAST SURGICAL HISTORY:  Past Surgical History:   Procedure Laterality Date     US BREAST BIOPSY LT  2017    benign, still has a lump       ALLERGIES  Cats and Dogs    FAMILY HX:  Family History   Problem Relation Age of Onset     Hypertension Mother      Diabetes Mother         type 2     Arthritis Mother      Bipolar Disorder Mother         per mom     Diabetes Maternal Grandmother         type 2     Cardiovascular Paternal Grandfather         Lung       SOCIAL HX:  Social History     Socioeconomic History     Marital status: Single     Spouse name: Not on file     Number of children: Not on file     Years of education: Not on file     Highest education level: Not on file   Occupational History     Not on file   Social Needs     Financial resource strain: Not on file     Food insecurity:     Worry: Not on file     Inability: Not on file     Transportation needs:     Medical: Not on file     Non-medical: Not on file   Tobacco Use     Smoking status: Never Smoker     Smokeless tobacco: Never Used   Substance and Sexual Activity     Alcohol use: Yes     Comment: OCC     Drug use: No     Sexual activity: Yes     Partners: Male   Lifestyle     Physical activity:     Days per week: Not on file     Minutes per session: Not on file     Stress: Not on file   Relationships     Social connections:     Talks on phone: Not on file     Gets together: Not on file      "Attends Hinduism service: Not on file     Active member of club or organization: Not on file     Attends meetings of clubs or organizations: Not on file     Relationship status: Not on file     Intimate partner violence:     Fear of current or ex partner: Not on file     Emotionally abused: Not on file     Physically abused: Not on file     Forced sexual activity: Not on file   Other Topics Concern     Parent/sibling w/ CABG, MI or angioplasty before 65F 55M? Not Asked   Social History Narrative    2019: lives with room mates, independnatly, no pets         Care giver, sitting job, lives with family,        ROS:  Constitutional: No recent fever, chills, or sweats. No significant weight gain/loss.   ENT: No epistaxis.  Allergies/Immunologic: As above.   Respiratory: No hemoptysis.   Cardiovascular: As per HPI.   GI: No hematemesis, melena, or hematochezia.   : No hematuria.   Skin: No petechia or ecchymosis.   Endocrinology: Negative for diabetes   Musculoskeletal: No myalgia.    VITAL SIGNS:  /84 (BP Location: Right arm, Patient Position: Chair, Cuff Size: Adult Large)   Pulse 102   Ht 1.651 m (5' 5\")   Wt 121 kg (266 lb 12.8 oz)   SpO2 96%   BMI 44.40 kg/m       Body mass index is 44.4 kg/m .  Wt Readings from Last 2 Encounters:   04/17/19 121 kg (266 lb 12.8 oz)   03/20/19 118.4 kg (261 lb)       PHYSICAL EXAM  Yael Johnson is a 28 year old female in no acute distress.  HEENT: Unremarkable.  Neck: JVP normal.  Carotids bilaterally without bruits.  Lungs: CTA.  Cor: RRR. Normal S1 and S2.  No murmur, rub, or gallop.   Abd: Soft, nontender, nondistended.  NABS.  No pulsatile mass.  Extremities: No C/C/E.  Pulses symmetric in upper and lower extremities.  Neuro: Grossly intact.    LABS    Lab Results   Component Value Date    WBC 7.5 03/08/2019     Lab Results   Component Value Date    RBC 4.90 03/08/2019     Lab Results   Component Value Date    HGB 13.0 03/08/2019     Lab Results   Component Value " Date    HCT 40.4 03/08/2019     No components found for: MCT  Lab Results   Component Value Date    MCV 82 03/08/2019     Lab Results   Component Value Date    MCH 26.5 03/08/2019     Lab Results   Component Value Date    MCHC 32.2 03/08/2019     Lab Results   Component Value Date    RDW 15.0 03/08/2019     Lab Results   Component Value Date     03/08/2019      Recent Labs   Lab Test 03/08/19  1232 05/31/18  1458    139   POTASSIUM 4.0 3.5   CHLORIDE 109 106   CO2 23 24   ANIONGAP 7 9   GLC 90 97   BUN 13 11   CR 0.76 0.73   RODNEY 9.0 8.7     Recent Labs   Lab Test 05/31/18  1458 05/09/16  1606   CHOL 96 114   HDL 29* 30*   LDL 52 70   TRIG 73 69   NHDL 67 84        EKG:  3/8/19 with sinus rhythm 68 bpm. Anteroseptal Q wave in V1-2 that are somewhat non-specific.     ECHO: Never    STRESS TEST:  Never    CARDIAC CATH:  Never    CXR:  2015 with normal or small heart size. Images reviewed with patient.     Event monitoring: Assisteratch 3/14/19 for 47 hours without atrial fibrillation or other major arrhythmias. Ectopic burden less than 1%        ASSESSMENT AND PLAN:    1. Palpitations      Palpitations only at rest while in bed and not related to exertion.  Able to walk 2 flights of stairs without difficulty.  Normal size heart on chest x-ray and normal cardiovascular exam. 47 hour monitoring without significant arrhythmia.   Patient worried that her weight puts her at risk for cardiovascular events including heart attack.  Not on contraception and she does not smoke. Advised that increased weight alone not strong risk factor and while she eats healthy, avoids smoking, avoids further weight gain and observers periodically her blood pressure she will be at low risk. Did not discuss risk for diabetes that could later put her for risk of heart disease.     Spent time discussing her EKG and 24-hour monitor results.  Patient reassured about her cardiovascular risk and advised to exercise 150 minutes/week and avoid  further weight gain.    Follow up: As needed based on symptoms.    Clinic nurse:  Pablo Ross LPN   Nurse Care Coordinator- Heart Care   [phone] option 1, than option 3    Academic Mailing address:  [facility]  Department of Internal Medicine ([facility])  [address] [place] [id number]    Med Reconcile: Reviewed and verified all current medications with the patient. The updated medication list was printed and given to the patient.  Patient stated she understood all health information given and agreed to call with further questions or concerns.      Please do not hesitate to contact me if you have any questions/concerns.     Sincerely,     Princess Jeffrey MD

## 2019-04-17 NOTE — LETTER
2019      RE: Yael Johnson  [address]  [place] [address]           CARDIOLOGY CONSULT    PCP: Angela Pelayo MD   Consulting provider: Angela Pelayo MD     HPI: Yael Johnson is a 28 year old female being seen today for evaluation of palpitations.  Her palpitations started about 2-1/2 months ago following  for a relative who was overweight. This was her brother in law and he  from heart attack. She will noticed her irregular heart rhythm when she is in bed, and primarily when she is laying on her stomach. No history of loss of consciousness chest discomfort or shortness of breath on exertion. Prior history of asthma but no recent change in exertional status. Able to walk 2 flight of stairs (FOS) without stopping at normal or fast pace.   Seen by Dr Pelayo on 3/8/19 and received Ziopatch monitor on 3/14/19 that did not show any events.   The patient denies a history of chest discomfort, dyspnea, PND (paroxysmal nocturnal dyspnea), orthopnea, pedal edema, lightheadedness and syncope.      The patient's risk factor profile is: (-) HTN, (-) DM, (-) hypercholesterolemia, (-) tobacco use, (-) fam Hx premature CAD.   The patient has no history of cardiovascular disease (CAD, CHF, arrhythmia, valvular heart disease).  The patient has no Hx of PAD or cerebrovascular disease.  The patient has not undergone prior cardiovascular evaluation and has never had an ECHO, stress study, cardiac catheterization, or EP study.     The ASCVD Risk score (Rekha DC Jr., et al., 2013) failed to calculate for the following reasons:    The 2013 ASCVD risk score is only valid for ages 40 to 79    PAST MEDICAL HISTORY:  Past Medical History:   Diagnosis Date     Asthma      Enlarged tonsils        CURRENT MEDICATIONS:  Current Outpatient Medications   Medication Sig Dispense Refill     ADVAIR DISKUS 100-50 MCG/DOSE inhaler INHALE 1 PUFF INTO THE LUNGS EVERY 12 HOURS 1 Inhaler 0     albuterol (2.5 MG/3ML) 0.083%  nebulizer solution Take 1 vial (2.5 mg) by nebulization every 6 hours as needed for shortness of breath / dyspnea 1 Box 11     albuterol (PROAIR HFA/PROVENTIL HFA/VENTOLIN HFA) 108 (90 Base) MCG/ACT Inhaler Inhale 2 puffs into the lungs every 6 hours as needed for shortness of breath / dyspnea or wheezing 1 Inhaler 1       PAST SURGICAL HISTORY:  Past Surgical History:   Procedure Laterality Date     US BREAST BIOPSY LT  2017    benign, still has a lump       ALLERGIES  Cats and Dogs    FAMILY HX:  Family History   Problem Relation Age of Onset     Hypertension Mother      Diabetes Mother         type 2     Arthritis Mother      Bipolar Disorder Mother         per mom     Diabetes Maternal Grandmother         type 2     Cardiovascular Paternal Grandfather         Lung       SOCIAL HX:  Social History     Socioeconomic History     Marital status: Single     Spouse name: Not on file     Number of children: Not on file     Years of education: Not on file     Highest education level: Not on file   Occupational History     Not on file   Social Needs     Financial resource strain: Not on file     Food insecurity:     Worry: Not on file     Inability: Not on file     Transportation needs:     Medical: Not on file     Non-medical: Not on file   Tobacco Use     Smoking status: Never Smoker     Smokeless tobacco: Never Used   Substance and Sexual Activity     Alcohol use: Yes     Comment: OCC     Drug use: No     Sexual activity: Yes     Partners: Male   Lifestyle     Physical activity:     Days per week: Not on file     Minutes per session: Not on file     Stress: Not on file   Relationships     Social connections:     Talks on phone: Not on file     Gets together: Not on file     Attends Uatsdin service: Not on file     Active member of club or organization: Not on file     Attends meetings of clubs or organizations: Not on file     Relationship status: Not on file     Intimate partner violence:     Fear of current or ex  "partner: Not on file     Emotionally abused: Not on file     Physically abused: Not on file     Forced sexual activity: Not on file   Other Topics Concern     Parent/sibling w/ CABG, MI or angioplasty before 65F 55M? Not Asked   Social History Narrative    2019: lives with room mates, independnatly, no pets         Care giver, sitting job, lives with family,        ROS:  Constitutional: No recent fever, chills, or sweats. No significant weight gain/loss.   ENT: No epistaxis.  Allergies/Immunologic: As above.   Respiratory: No hemoptysis.   Cardiovascular: As per HPI.   GI: No hematemesis, melena, or hematochezia.   : No hematuria.   Skin: No petechia or ecchymosis.   Endocrinology: Negative for diabetes   Musculoskeletal: No myalgia.    VITAL SIGNS:  /84 (BP Location: Right arm, Patient Position: Chair, Cuff Size: Adult Large)   Pulse 102   Ht 1.651 m (5' 5\")   Wt 121 kg (266 lb 12.8 oz)   SpO2 96%   BMI 44.40 kg/m       Body mass index is 44.4 kg/m .  Wt Readings from Last 2 Encounters:   04/17/19 121 kg (266 lb 12.8 oz)   03/20/19 118.4 kg (261 lb)       PHYSICAL EXAM  Yael Johnson is a 28 year old female in no acute distress.  HEENT: Unremarkable.  Neck: JVP normal.  Carotids bilaterally without bruits.  Lungs: CTA.  Cor: RRR. Normal S1 and S2.  No murmur, rub, or gallop.   Abd: Soft, nontender, nondistended.  NABS.  No pulsatile mass.  Extremities: No C/C/E.  Pulses symmetric in upper and lower extremities.  Neuro: Grossly intact.    LABS    Lab Results   Component Value Date    WBC 7.5 03/08/2019     Lab Results   Component Value Date    RBC 4.90 03/08/2019     Lab Results   Component Value Date    HGB 13.0 03/08/2019     Lab Results   Component Value Date    HCT 40.4 03/08/2019     No components found for: MCT  Lab Results   Component Value Date    MCV 82 03/08/2019     Lab Results   Component Value Date    MCH 26.5 03/08/2019     Lab Results   Component Value Date    MCHC 32.2 03/08/2019 "     Lab Results   Component Value Date    RDW 15.0 03/08/2019     Lab Results   Component Value Date     03/08/2019      Recent Labs   Lab Test 03/08/19  1232 05/31/18  1458    139   POTASSIUM 4.0 3.5   CHLORIDE 109 106   CO2 23 24   ANIONGAP 7 9   GLC 90 97   BUN 13 11   CR 0.76 0.73   RODNEY 9.0 8.7     Recent Labs   Lab Test 05/31/18  1458 05/09/16  1606   CHOL 96 114   HDL 29* 30*   LDL 52 70   TRIG 73 69   NHDL 67 84        EKG:  3/8/19 with sinus rhythm 68 bpm. Anteroseptal Q wave in V1-2 that are somewhat non-specific.     ECHO: Never    STRESS TEST:  Never    CARDIAC CATH:  Never    CXR:  2015 with normal or small heart size. Images reviewed with patient.     Event monitoring: Ziopatch 3/14/19 for 47 hours without atrial fibrillation or other major arrhythmias. Ectopic burden less than 1%        ASSESSMENT AND PLAN:    1. Palpitations      Palpitations only at rest while in bed and not related to exertion.  Able to walk 2 flights of stairs without difficulty.  Normal size heart on chest x-ray and normal cardiovascular exam. 47 hour monitoring without significant arrhythmia.   Patient worried that her weight puts her at risk for cardiovascular events including heart attack.  Not on contraception and she does not smoke. Advised that increased weight alone not strong risk factor and while she eats healthy, avoids smoking, avoids further weight gain and observers periodically her blood pressure she will be at low risk. Did not discuss risk for diabetes that could later put her for risk of heart disease.     Spent time discussing her EKG and 24-hour monitor results.  Patient reassured about her cardiovascular risk and advised to exercise 150 minutes/week and avoid further weight gain.    Follow up: As needed based on symptoms.    Clinic nurse:  Pablo Ross LPN   Nurse Care Coordinator- Heart Care   [phone] option 1, than option 3    Academic Mailing address:  [facility]  [place]  Department of Internal Medicine ([facility] [address])  [address] [place] [id number]      Med Reconcile: Reviewed and verified all current medications with the patient. The updated medication list was printed and given to the patient.  Patient stated she understood all health information given and agreed to call with further questions or concerns.      Princess Jeffrey MD

## 2019-04-17 NOTE — PROGRESS NOTES
CARDIOLOGY CONSULT    PCP: Angela Pelayo MD   Consulting provider: Angela Pelayo MD     HPI: Yael Johnson is a 28 year old female being seen today for evaluation of palpitations.  Her palpitations started about 2-1/2 months ago following  for a relative who was overweight. This was her brother in law and he  from heart attack. She will noticed her irregular heart rhythm when she is in bed, and primarily when she is laying on her stomach. No history of loss of consciousness chest discomfort or shortness of breath on exertion. Prior history of asthma but no recent change in exertional status. Able to walk 2 flight of stairs (FOS) without stopping at normal or fast pace.   Seen by Dr Pelayo on 3/8/19 and received Ziopatch monitor on 3/14/19 that did not show any events.   The patient denies a history of chest discomfort, dyspnea, PND (paroxysmal nocturnal dyspnea), orthopnea, pedal edema, lightheadedness and syncope.      The patient's risk factor profile is: (-) HTN, (-) DM, (-) hypercholesterolemia, (-) tobacco use, (-) fam Hx premature CAD.   The patient has no history of cardiovascular disease (CAD, CHF, arrhythmia, valvular heart disease).  The patient has no Hx of PAD or cerebrovascular disease.  The patient has not undergone prior cardiovascular evaluation and has never had an ECHO, stress study, cardiac catheterization, or EP study.     The ASCVD Risk score (Rekha DC Jr., et al., 2013) failed to calculate for the following reasons:    The 2013 ASCVD risk score is only valid for ages 40 to 79    PAST MEDICAL HISTORY:  Past Medical History:   Diagnosis Date     Asthma      Enlarged tonsils        CURRENT MEDICATIONS:  Current Outpatient Medications   Medication Sig Dispense Refill     ADVAIR DISKUS 100-50 MCG/DOSE inhaler INHALE 1 PUFF INTO THE LUNGS EVERY 12 HOURS 1 Inhaler 0     albuterol (2.5 MG/3ML) 0.083% nebulizer solution Take 1 vial (2.5 mg) by nebulization every 6 hours as needed for  shortness of breath / dyspnea 1 Box 11     albuterol (PROAIR HFA/PROVENTIL HFA/VENTOLIN HFA) 108 (90 Base) MCG/ACT Inhaler Inhale 2 puffs into the lungs every 6 hours as needed for shortness of breath / dyspnea or wheezing 1 Inhaler 1       PAST SURGICAL HISTORY:  Past Surgical History:   Procedure Laterality Date     US BREAST BIOPSY LT  2017    benign, still has a lump       ALLERGIES  Cats and Dogs    FAMILY HX:  Family History   Problem Relation Age of Onset     Hypertension Mother      Diabetes Mother         type 2     Arthritis Mother      Bipolar Disorder Mother         per mom     Diabetes Maternal Grandmother         type 2     Cardiovascular Paternal Grandfather         Lung       SOCIAL HX:  Social History     Socioeconomic History     Marital status: Single     Spouse name: Not on file     Number of children: Not on file     Years of education: Not on file     Highest education level: Not on file   Occupational History     Not on file   Social Needs     Financial resource strain: Not on file     Food insecurity:     Worry: Not on file     Inability: Not on file     Transportation needs:     Medical: Not on file     Non-medical: Not on file   Tobacco Use     Smoking status: Never Smoker     Smokeless tobacco: Never Used   Substance and Sexual Activity     Alcohol use: Yes     Comment: OCC     Drug use: No     Sexual activity: Yes     Partners: Male   Lifestyle     Physical activity:     Days per week: Not on file     Minutes per session: Not on file     Stress: Not on file   Relationships     Social connections:     Talks on phone: Not on file     Gets together: Not on file     Attends Mandaeism service: Not on file     Active member of club or organization: Not on file     Attends meetings of clubs or organizations: Not on file     Relationship status: Not on file     Intimate partner violence:     Fear of current or ex partner: Not on file     Emotionally abused: Not on file     Physically abused: Not  "on file     Forced sexual activity: Not on file   Other Topics Concern     Parent/sibling w/ CABG, MI or angioplasty before 65F 55M? Not Asked   Social History Narrative    2019: lives with room mates, independnatly, no pets         Care giver, sitting job, lives with family,        ROS:  Constitutional: No recent fever, chills, or sweats. No significant weight gain/loss.   ENT: No epistaxis.  Allergies/Immunologic: As above.   Respiratory: No hemoptysis.   Cardiovascular: As per HPI.   GI: No hematemesis, melena, or hematochezia.   : No hematuria.   Skin: No petechia or ecchymosis.   Endocrinology: Negative for diabetes   Musculoskeletal: No myalgia.    VITAL SIGNS:  /84 (BP Location: Right arm, Patient Position: Chair, Cuff Size: Adult Large)   Pulse 102   Ht 1.651 m (5' 5\")   Wt 121 kg (266 lb 12.8 oz)   SpO2 96%   BMI 44.40 kg/m      Body mass index is 44.4 kg/m .  Wt Readings from Last 2 Encounters:   04/17/19 121 kg (266 lb 12.8 oz)   03/20/19 118.4 kg (261 lb)       PHYSICAL EXAM  Yael Johnson is a 28 year old female in no acute distress.  HEENT: Unremarkable.  Neck: JVP normal.  Carotids bilaterally without bruits.  Lungs: CTA.  Cor: RRR. Normal S1 and S2.  No murmur, rub, or gallop.   Abd: Soft, nontender, nondistended.  NABS.  No pulsatile mass.  Extremities: No C/C/E.  Pulses symmetric in upper and lower extremities.  Neuro: Grossly intact.    LABS    Lab Results   Component Value Date    WBC 7.5 03/08/2019     Lab Results   Component Value Date    RBC 4.90 03/08/2019     Lab Results   Component Value Date    HGB 13.0 03/08/2019     Lab Results   Component Value Date    HCT 40.4 03/08/2019     No components found for: MCT  Lab Results   Component Value Date    MCV 82 03/08/2019     Lab Results   Component Value Date    MCH 26.5 03/08/2019     Lab Results   Component Value Date    MCHC 32.2 03/08/2019     Lab Results   Component Value Date    RDW 15.0 03/08/2019     Lab Results   Component " Value Date     03/08/2019      Recent Labs   Lab Test 03/08/19  1232 05/31/18  1458    139   POTASSIUM 4.0 3.5   CHLORIDE 109 106   CO2 23 24   ANIONGAP 7 9   GLC 90 97   BUN 13 11   CR 0.76 0.73   RODNEY 9.0 8.7     Recent Labs   Lab Test 05/31/18  1458 05/09/16  1606   CHOL 96 114   HDL 29* 30*   LDL 52 70   TRIG 73 69   NHDL 67 84        EKG:  3/8/19 with sinus rhythm 68 bpm. Anteroseptal Q wave in V1-2 that are somewhat non-specific.     ECHO: Never    STRESS TEST:  Never    CARDIAC CATH:  Never    CXR:  2015 with normal or small heart size. Images reviewed with patient.     Event monitoring: Ziopatch 3/14/19 for 47 hours without atrial fibrillation or other major arrhythmias. Ectopic burden less than 1%        ASSESSMENT AND PLAN:    1. Palpitations      Palpitations only at rest while in bed and not related to exertion.  Able to walk 2 flights of stairs without difficulty.  Normal size heart on chest x-ray and normal cardiovascular exam. 47 hour monitoring without significant arrhythmia.   Patient worried that her weight puts her at risk for cardiovascular events including heart attack.  Not on contraception and she does not smoke. Advised that increased weight alone not strong risk factor and while she eats healthy, avoids smoking, avoids further weight gain and observers periodically her blood pressure she will be at low risk. Did not discuss risk for diabetes that could later put her for risk of heart disease.     Spent time discussing her EKG and 24-hour monitor results.  Patient reassured about her cardiovascular risk and advised to exercise 150 minutes/week and avoid further weight gain.    Follow up: As needed based on symptoms.      Princess Jeffrey MD    Division of Cardiology  [facility] [address] [address]    [phone] Appointments  [phone] Fax  [phone] After hours    Clinic nurse:  Pablo  MATT Ross   Nurse Care Coordinator- Heart Care   [phone] option 1, than option 3    Academic Mailing address:  [facility]  Department of Internal Medicine ([facility] 018)  [address] [place] [address]

## 2019-04-17 NOTE — LETTER
2019      RE: Yael Johnson  [address]  [place] [address]           CARDIOLOGY CONSULT    PCP: Angela Pelayo MD   Consulting provider: Angela Pelayo MD     HPI: Yael Johnson is a 28 year old female being seen today for evaluation of palpitations.  Her palpitations started about 2-1/2 months ago following  for a relative who was overweight. This was her brother in law and he  from heart attack. She will noticed her irregular heart rhythm when she is in bed, and primarily when she is laying on her stomach. No history of loss of consciousness chest discomfort or shortness of breath on exertion. Prior history of asthma but no recent change in exertional status. Able to walk 2 flight of stairs (FOS) without stopping at normal or fast pace.   Seen by Dr Pelayo on 3/8/19 and received Ziopatch monitor on 3/14/19 that did not show any events.   The patient denies a history of chest discomfort, dyspnea, PND (paroxysmal nocturnal dyspnea), orthopnea, pedal edema, lightheadedness and syncope.      The patient's risk factor profile is: (-) HTN, (-) DM, (-) hypercholesterolemia, (-) tobacco use, (-) fam Hx premature CAD.   The patient has no history of cardiovascular disease (CAD, CHF, arrhythmia, valvular heart disease).  The patient has no Hx of PAD or cerebrovascular disease.  The patient has not undergone prior cardiovascular evaluation and has never had an ECHO, stress study, cardiac catheterization, or EP study.     The ASCVD Risk score (Rekha DC Jr., et al., 2013) failed to calculate for the following reasons:    The 2013 ASCVD risk score is only valid for ages 40 to 79    PAST MEDICAL HISTORY:  Past Medical History:   Diagnosis Date     Asthma      Enlarged tonsils        CURRENT MEDICATIONS:  Current Outpatient Medications   Medication Sig Dispense Refill     ADVAIR DISKUS 100-50 MCG/DOSE inhaler INHALE 1 PUFF INTO THE LUNGS EVERY 12 HOURS 1 Inhaler 0     albuterol (2.5 MG/3ML) 0.083%  nebulizer solution Take 1 vial (2.5 mg) by nebulization every 6 hours as needed for shortness of breath / dyspnea 1 Box 11     albuterol (PROAIR HFA/PROVENTIL HFA/VENTOLIN HFA) 108 (90 Base) MCG/ACT Inhaler Inhale 2 puffs into the lungs every 6 hours as needed for shortness of breath / dyspnea or wheezing 1 Inhaler 1       PAST SURGICAL HISTORY:  Past Surgical History:   Procedure Laterality Date     US BREAST BIOPSY LT  2017    benign, still has a lump       ALLERGIES  Cats and Dogs    FAMILY HX:  Family History   Problem Relation Age of Onset     Hypertension Mother      Diabetes Mother         type 2     Arthritis Mother      Bipolar Disorder Mother         per mom     Diabetes Maternal Grandmother         type 2     Cardiovascular Paternal Grandfather         Lung       SOCIAL HX:  Social History     Socioeconomic History     Marital status: Single     Spouse name: Not on file     Number of children: Not on file     Years of education: Not on file     Highest education level: Not on file   Occupational History     Not on file   Social Needs     Financial resource strain: Not on file     Food insecurity:     Worry: Not on file     Inability: Not on file     Transportation needs:     Medical: Not on file     Non-medical: Not on file   Tobacco Use     Smoking status: Never Smoker     Smokeless tobacco: Never Used   Substance and Sexual Activity     Alcohol use: Yes     Comment: OCC     Drug use: No     Sexual activity: Yes     Partners: Male   Lifestyle     Physical activity:     Days per week: Not on file     Minutes per session: Not on file     Stress: Not on file   Relationships     Social connections:     Talks on phone: Not on file     Gets together: Not on file     Attends Religion service: Not on file     Active member of club or organization: Not on file     Attends meetings of clubs or organizations: Not on file     Relationship status: Not on file     Intimate partner violence:     Fear of current or ex  "partner: Not on file     Emotionally abused: Not on file     Physically abused: Not on file     Forced sexual activity: Not on file   Other Topics Concern     Parent/sibling w/ CABG, MI or angioplasty before 65F 55M? Not Asked   Social History Narrative    2019: lives with room mates, independnatly, no pets         Care giver, sitting job, lives with family,        ROS:  Constitutional: No recent fever, chills, or sweats. No significant weight gain/loss.   ENT: No epistaxis.  Allergies/Immunologic: As above.   Respiratory: No hemoptysis.   Cardiovascular: As per HPI.   GI: No hematemesis, melena, or hematochezia.   : No hematuria.   Skin: No petechia or ecchymosis.   Endocrinology: Negative for diabetes   Musculoskeletal: No myalgia.    VITAL SIGNS:  /84 (BP Location: Right arm, Patient Position: Chair, Cuff Size: Adult Large)   Pulse 102   Ht 1.651 m (5' 5\")   Wt 121 kg (266 lb 12.8 oz)   SpO2 96%   BMI 44.40 kg/m       Body mass index is 44.4 kg/m .  Wt Readings from Last 2 Encounters:   04/17/19 121 kg (266 lb 12.8 oz)   03/20/19 118.4 kg (261 lb)       PHYSICAL EXAM  Yael Johnson is a 28 year old female in no acute distress.  HEENT: Unremarkable.  Neck: JVP normal.  Carotids bilaterally without bruits.  Lungs: CTA.  Cor: RRR. Normal S1 and S2.  No murmur, rub, or gallop.   Abd: Soft, nontender, nondistended.  NABS.  No pulsatile mass.  Extremities: No C/C/E.  Pulses symmetric in upper and lower extremities.  Neuro: Grossly intact.    LABS    Lab Results   Component Value Date    WBC 7.5 03/08/2019     Lab Results   Component Value Date    RBC 4.90 03/08/2019     Lab Results   Component Value Date    HGB 13.0 03/08/2019     Lab Results   Component Value Date    HCT 40.4 03/08/2019     No components found for: MCT  Lab Results   Component Value Date    MCV 82 03/08/2019     Lab Results   Component Value Date    MCH 26.5 03/08/2019     Lab Results   Component Value Date    MCHC 32.2 03/08/2019 "     Lab Results   Component Value Date    RDW 15.0 03/08/2019     Lab Results   Component Value Date     03/08/2019      Recent Labs   Lab Test 03/08/19  1232 05/31/18  1458    139   POTASSIUM 4.0 3.5   CHLORIDE 109 106   CO2 23 24   ANIONGAP 7 9   GLC 90 97   BUN 13 11   CR 0.76 0.73   RODNEY 9.0 8.7     Recent Labs   Lab Test 05/31/18  1458 05/09/16  1606   CHOL 96 114   HDL 29* 30*   LDL 52 70   TRIG 73 69   NHDL 67 84        EKG:  3/8/19 with sinus rhythm 68 bpm. Anteroseptal Q wave in V1-2 that are somewhat non-specific.     ECHO: Never    STRESS TEST:  Never    CARDIAC CATH:  Never    CXR:  2015 with normal or small heart size. Images reviewed with patient.     Event monitoring: Ziopatch 3/14/19 for 47 hours without atrial fibrillation or other major arrhythmias. Ectopic burden less than 1%        ASSESSMENT AND PLAN:    1. Palpitations      Palpitations only at rest while in bed and not related to exertion.  Able to walk 2 flights of stairs without difficulty.  Normal size heart on chest x-ray and normal cardiovascular exam. 47 hour monitoring without significant arrhythmia.   Patient worried that her weight puts her at risk for cardiovascular events including heart attack.  Not on contraception and she does not smoke. Advised that increased weight alone not strong risk factor and while she eats healthy, avoids smoking, avoids further weight gain and observers periodically her blood pressure she will be at low risk. Did not discuss risk for diabetes that could later put her for risk of heart disease.     Spent time discussing her EKG and 24-hour monitor results.  Patient reassured about her cardiovascular risk and advised to exercise 150 minutes/week and avoid further weight gain.    Follow up: As needed based on symptoms.    Clinic nurse:  Pablo Ross LPN   Nurse Care Coordinator- Heart Care   [phone] option 1, than option 3    Academic Mailing address:  [facility]  [place]  Department of Internal Medicine ([facility] [address])  [address] [place] [id number]      Med Reconcile: Reviewed and verified all current medications with the patient. The updated medication list was printed and given to the patient.  Patient stated she understood all health information given and agreed to call with further questions or concerns.      Princess Jeffrey MD

## 2019-05-14 DIAGNOSIS — J45.40 MODERATE PERSISTENT ASTHMA WITHOUT COMPLICATION: ICD-10-CM

## 2019-05-14 NOTE — TELEPHONE ENCOUNTER
"Requested Prescriptions   Pending Prescriptions Disp Refills     ADVAIR DISKUS 100-50 MCG/DOSE inhaler [Pharmacy Med Name: ADVAIR 100-50 DISKUS]  0     Sig: INHALE 1 PUFF INTO THE LUNGS EVERY 12 HOURS  Last Written Prescription Date:  2/5/2019  Last Fill Quantity: 1,  # refills: 0   Last Office Visit: 3/8/2019   Future Office Visit:            Inhaled Steroids Protocol Passed - 5/14/2019  1:41 AM        Passed - Patient is age 12 or older        Passed - Asthma control assessment score within normal limits in last 6 months     Please review ACT score.   ACT Total Scores 1/8/2019 2/27/2019 3/8/2019   ACT TOTAL SCORE - - -   ASTHMA ER VISITS - - -   ASTHMA HOSPITALIZATIONS - - -   ACT TOTAL SCORE (Goal Greater than or Equal to 20) 16 23 23   In the past 12 months, how many times did you visit the emergency room for your asthma without being admitted to the hospital? 0 0 0   In the past 12 months, how many times were you hospitalized overnight because of your asthma? 0 0 0           Passed - Medication is active on med list        Passed - Recent (6 mo) or future (30 days) visit within the authorizing provider's specialty     Patient had office visit in the last 6 months or has a visit in the next 30 days with authorizing provider or within the authorizing provider's specialty.  See \"Patient Info\" tab in inbasket, or \"Choose Columns\" in Meds & Orders section of the refill encounter.              "

## 2019-05-16 NOTE — TELEPHONE ENCOUNTER
Prescription approved per [facility] Refill Protocol.  Estelle Guevara RN

## 2019-08-10 ENCOUNTER — HOSPITAL ENCOUNTER (EMERGENCY)
Facility: CLINIC | Age: 29
Discharge: HOME | End: 2019-08-10
Attending: EMERGENCY MEDICINE | Admitting: EMERGENCY MEDICINE
Payer: COMMERCIAL

## 2019-08-10 VITALS
DIASTOLIC BLOOD PRESSURE: 74 MMHG | WEIGHT: 252.2 LBS | TEMPERATURE: 97.8 F | HEART RATE: 80 BPM | HEIGHT: 65 IN | RESPIRATION RATE: 15 BRPM | BODY MASS INDEX: 42.02 KG/M2 | OXYGEN SATURATION: 100 % | SYSTOLIC BLOOD PRESSURE: 116 MMHG

## 2019-08-10 DIAGNOSIS — R45.851 SUICIDE IDEATION: ICD-10-CM

## 2019-08-10 LAB
ALCOHOL BREATH TEST: 0.11 (ref 0–0.01)
AMPHETAMINES UR QL SCN: NEGATIVE
BARBITURATES UR QL: NEGATIVE
BENZODIAZ UR QL: NEGATIVE
CANNABINOIDS UR QL SCN: NEGATIVE
COCAINE UR QL: NEGATIVE
ETHANOL UR QL SCN: POSITIVE
HCG UR QL: NEGATIVE
OPIATES UR QL SCN: NEGATIVE

## 2019-08-10 PROCEDURE — 99283 EMERGENCY DEPT VISIT LOW MDM: CPT | Mod: Z6 | Performed by: EMERGENCY MEDICINE

## 2019-08-10 PROCEDURE — 90791 PSYCH DIAGNOSTIC EVALUATION: CPT

## 2019-08-10 PROCEDURE — 80307 DRUG TEST PRSMV CHEM ANLYZR: CPT | Performed by: FAMILY MEDICINE

## 2019-08-10 PROCEDURE — 99285 EMERGENCY DEPT VISIT HI MDM: CPT | Mod: 25 | Performed by: EMERGENCY MEDICINE

## 2019-08-10 PROCEDURE — 81025 URINE PREGNANCY TEST: CPT | Performed by: FAMILY MEDICINE

## 2019-08-10 PROCEDURE — 80320 DRUG SCREEN QUANTALCOHOLS: CPT | Performed by: FAMILY MEDICINE

## 2019-08-10 ASSESSMENT — MIFFLIN-ST. JEOR: SCORE: 1884.85

## 2019-08-10 NOTE — ED AVS SNAPSHOT
[facility] [place], Emergency Department  [address]  [place] [address]  Phone:  [phone]  Fax:  [phone]                                    Yael Johnson   MRN: [id number]    Department:  [facility], Emergency Department   Date of Visit:  8/10/2019           After Visit Summary Signature Page    I have received my discharge instructions, and my questions have been answered. I have discussed any challenges I see with this plan with the nurse or doctor.    ..........................................................................................................................................  Patient/Patient Representative Signature      ..........................................................................................................................................  Patient Representative Print Name and Relationship to Patient    ..................................................               ................................................  Date                                   Time    ..........................................................................................................................................  Reviewed by Signature/Title    ...................................................              ..............................................  Date                                               Time          22EPIC Rev 08/18

## 2019-08-10 NOTE — ED NOTES
Bed: ED16A  Expected date: 8/10/19  Expected time: 4:30 AM  Means of arrival:   Comments:  [facility] Pelayo. 28 y.o F SI with plan, coop, not on a hold. Y. 5 mins

## 2019-08-10 NOTE — LETTER
August 10, 2019      To Whom It May Concern:      Yael Johnson was seen in our Emergency Department today, 08/10/19.  I expect her condition to improve over the next 2 days.  She may return to work/school when improved.    Sincerely,        Hayley Burgos MD
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
## 2019-08-10 NOTE — ED TRIAGE NOTES
Paramedics state that pt was drinking and called 911 stating she was suicidal w/plan to right eye on OTC tylenol/aspirin.  /96

## 2019-09-05 DIAGNOSIS — J45.40 MODERATE PERSISTENT ASTHMA WITHOUT COMPLICATION: ICD-10-CM

## 2019-09-05 NOTE — TELEPHONE ENCOUNTER
"Requested Prescriptions   Pending Prescriptions Disp Refills     ADVAIR DISKUS 100-50 MCG/DOSE inhaler [Pharmacy Med Name: ADVAIR 100-50 DISKUS]  2     Sig: INHALE 1 PUFF INTO THE LUNGS EVERY 12 HOURS  Last Written Prescription Date:  5/16/2019  Last Fill Quantity: 1,  # refills: 2   Last Office Visit: 3/8/2019   Future Office Visit:            Inhaled Steroids Protocol Failed - 9/5/2019  3:41 AM        Failed - Asthma control assessment score within normal limits in last 6 months     Please review ACT score.   ACT Total Scores 1/8/2019 2/27/2019 3/8/2019   ACT TOTAL SCORE - - -   ASTHMA ER VISITS - - -   ASTHMA HOSPITALIZATIONS - - -   ACT TOTAL SCORE (Goal Greater than or Equal to 20) 16 23 23   In the past 12 months, how many times did you visit the emergency room for your asthma without being admitted to the hospital? 0 0 0   In the past 12 months, how many times were you hospitalized overnight because of your asthma? 0 0 0           Failed - Recent (6 mo) or future (30 days) visit within the authorizing provider's specialty     Patient had office visit in the last 6 months or has a visit in the next 30 days with authorizing provider or within the authorizing provider's specialty.  See \"Patient Info\" tab in inbasket, or \"Choose Columns\" in Meds & Orders section of the refill encounter.            Passed - Patient is age 12 or older        Passed - Medication is active on med list          "

## 2019-09-07 ENCOUNTER — NURSE TRIAGE (OUTPATIENT)
Dept: NURSING | Facility: CLINIC | Age: 29
End: 2019-09-07

## 2019-09-08 NOTE — TELEPHONE ENCOUNTER
Pt states the fingertips on one hand are swollen which began while cutting up cauliflower tonight. Denies breathing or swallowing difficulty. Denies hives or itching. Fingertips not itchy just swollen. Advised call/see provider next week when clinic is open. Advised call back if new or worsening sx.     Reason for Disposition    Nursing judgment    Additional Information    Negative: Nursing judgment    Negative: Information only call; adult is not ill or injured    Negative: Nursing judgment    Negative: Nursing judgment    Negative: Nursing judgment    Negative: Nursing judgment    Negative: Nursing judgment    Negative: Nursing judgment    Negative: Nursing judgment    Negative: Nursing judgment    Negative: Nursing judgment    Negative: Nursing judgment    Protocols used: NO GUIDELINE OR REFERENCE [id number]

## 2019-09-09 NOTE — TELEPHONE ENCOUNTER
Panel Management Review      Patient has the following on her problem list:     Asthma review     ACT Total Scores 3/8/2019   ACT TOTAL SCORE -   ASTHMA ER VISITS -   ASTHMA HOSPITALIZATIONS -   ACT TOTAL SCORE (Goal Greater than or Equal to 20) 23   In the past 12 months, how many times did you visit the emergency room for your asthma without being admitted to the hospital? 0   In the past 12 months, how many times were you hospitalized overnight because of your asthma? 0      1. Is Asthma diagnosis on the Problem List? Yes    2. Is Asthma listed on Health Maintenance? Yes    3. Patient is due for:  ACT      Composite cancer screening  Chart review shows that this patient is due/due soon for the following None  Summary:    Patient is due/failing the following:   ACT    Action needed:   Patient needs to do ACT.    Type of outreach:    Phone, left message for patient to call back.     Questions for provider review:    None                                                                                                                                    Diane Deleon [facility]       Chart routed to Care Team .

## 2019-09-09 NOTE — TELEPHONE ENCOUNTER
Routing refill request to provider for review/approval because:  --RN protocol requires every six month office visit for asthma patients.  --Please let TC know if you do want patient in for an office visit.      MA  --Please contact patient for ACT.  Thank you.

## 2019-09-19 ENCOUNTER — TRANSFERRED RECORDS (OUTPATIENT)
Dept: MULTI SPECIALTY CLINIC | Facility: CLINIC | Age: 29
End: 2019-09-19

## 2019-09-20 LAB — PAP-ABSTRACT: NORMAL

## 2019-09-20 NOTE — TELEPHONE ENCOUNTER
Panel Management Review      Patient has the following on her problem list:     Asthma review     ACT Total Scores 3/8/2019   ACT TOTAL SCORE -   ASTHMA ER VISITS -   ASTHMA HOSPITALIZATIONS -   ACT TOTAL SCORE (Goal Greater than or Equal to 20) 23   In the past 12 months, how many times did you visit the emergency room for your asthma without being admitted to the hospital? 0   In the past 12 months, how many times were you hospitalized overnight because of your asthma? 0      1. Is Asthma diagnosis on the Problem List? Yes    2. Is Asthma listed on Health Maintenance? Yes    3. Patient is due for:  ACT      Composite cancer screening  Chart review shows that this patient is due/due soon for the following None  Summary:    Patient is due/failing the following:   ACT    Action needed:   Patient needs to do ACT.    Type of outreach:    Phone, spoke to patient and completed ACT. Score=21    Questions for provider review:    None                                                                                                                                    Sylvie Duvall, [facility]       Chart routed to Care Team .

## 2019-09-21 ASSESSMENT — ASTHMA QUESTIONNAIRES: ACT_TOTALSCORE: 21

## 2019-11-03 ENCOUNTER — HEALTH MAINTENANCE LETTER (OUTPATIENT)
Age: 29
End: 2019-11-03

## 2019-11-26 ENCOUNTER — TELEPHONE (OUTPATIENT)
Dept: FAMILY MEDICINE | Facility: CLINIC | Age: 29
End: 2019-11-26

## 2019-11-26 NOTE — TELEPHONE ENCOUNTER
EDIL Nieves: spoke patient, she declines red flag symptoms. Symptoms have been present for approximately 4 months    Patient decided she will re-schedule her surgery and reschedule her pre-op    Her office visit has been changed on Friday to address the symptoms she mentioned. Due to her work schedule, she is unable to be seen at  or evaluated sooner    Thank You!  Flory Angeles, RN  Triage Nurse

## 2019-11-26 NOTE — TELEPHONE ENCOUNTER
"Huddled with Dr. Pelayo. Patient has office visit scheduled 11/29/2019 for pre-op.     In office visit notes, patient describes \"I also need to be checked because I have been having a pinching chest pain on the left side of my chest by heart also my right leg gets tired easy and I want to check for a blood clot.\"    Patient will need additional office visit to address these symptoms. She needs to be evaluated sooner for them    Thank You!  Flory Angeles, RN  Triage Nurse  "

## 2019-11-28 PROBLEM — F32.9 MAJOR DEPRESSIVE DISORDER, REMISSION STATUS UNSPECIFIED, UNSPECIFIED WHETHER RECURRENT: Status: ACTIVE | Noted: 2019-11-28

## 2019-11-28 PROBLEM — N84.0 UTERINE POLYP: Status: ACTIVE | Noted: 2019-11-28

## 2019-11-28 PROBLEM — N93.9 ABNORMAL UTERINE BLEEDING (AUB): Status: ACTIVE | Noted: 2019-11-28

## 2019-11-28 PROBLEM — F43.20 ADJUSTMENT DISORDER, UNSPECIFIED TYPE: Status: RESOLVED | Noted: 2019-04-11 | Resolved: 2019-11-28

## 2019-11-28 PROBLEM — F41.1 GAD (GENERALIZED ANXIETY DISORDER): Status: ACTIVE | Noted: 2019-11-28

## 2019-12-06 ENCOUNTER — MYC MEDICAL ADVICE (OUTPATIENT)
Dept: FAMILY MEDICINE | Facility: CLINIC | Age: 29
End: 2019-12-06

## 2019-12-06 ENCOUNTER — OFFICE VISIT (OUTPATIENT)
Dept: FAMILY MEDICINE | Facility: CLINIC | Age: 29
End: 2019-12-06
Payer: COMMERCIAL

## 2019-12-06 VITALS
SYSTOLIC BLOOD PRESSURE: 138 MMHG | RESPIRATION RATE: 14 BRPM | HEART RATE: 82 BPM | OXYGEN SATURATION: 99 % | DIASTOLIC BLOOD PRESSURE: 72 MMHG | TEMPERATURE: 97.3 F | BODY MASS INDEX: 42.6 KG/M2 | WEIGHT: 256 LBS

## 2019-12-06 DIAGNOSIS — R29.898 LEG FATIGUE: ICD-10-CM

## 2019-12-06 DIAGNOSIS — R07.89 ATYPICAL CHEST PAIN: Primary | ICD-10-CM

## 2019-12-06 DIAGNOSIS — N93.9 ABNORMAL UTERINE BLEEDING (AUB): ICD-10-CM

## 2019-12-06 DIAGNOSIS — R79.0 LOW FERRITIN: ICD-10-CM

## 2019-12-06 DIAGNOSIS — M25.562 LEFT KNEE PAIN, UNSPECIFIED CHRONICITY: ICD-10-CM

## 2019-12-06 DIAGNOSIS — E66.01 MORBID OBESITY (H): ICD-10-CM

## 2019-12-06 DIAGNOSIS — R00.2 PALPITATIONS: ICD-10-CM

## 2019-12-06 DIAGNOSIS — J45.40 MODERATE PERSISTENT ASTHMA WITHOUT COMPLICATION: ICD-10-CM

## 2019-12-06 DIAGNOSIS — F32.9 MAJOR DEPRESSIVE DISORDER, REMISSION STATUS UNSPECIFIED, UNSPECIFIED WHETHER RECURRENT: ICD-10-CM

## 2019-12-06 DIAGNOSIS — N84.0 UTERINE POLYP: ICD-10-CM

## 2019-12-06 DIAGNOSIS — F41.1 GAD (GENERALIZED ANXIETY DISORDER): ICD-10-CM

## 2019-12-06 LAB
ALBUMIN SERPL-MCNC: 3.6 G/DL (ref 3.4–5)
ALP SERPL-CCNC: 63 U/L (ref 40–150)
ALT SERPL W P-5'-P-CCNC: 19 U/L (ref 0–50)
ANION GAP SERPL CALCULATED.3IONS-SCNC: 7 MMOL/L (ref 3–14)
AST SERPL W P-5'-P-CCNC: 12 U/L (ref 0–45)
BASOPHILS # BLD AUTO: 0 10E9/L (ref 0–0.2)
BASOPHILS NFR BLD AUTO: 0.2 %
BILIRUB SERPL-MCNC: 0.3 MG/DL (ref 0.2–1.3)
BUN SERPL-MCNC: 18 MG/DL (ref 7–30)
CALCIUM SERPL-MCNC: 8.8 MG/DL (ref 8.5–10.1)
CHLORIDE SERPL-SCNC: 108 MMOL/L (ref 94–109)
CO2 SERPL-SCNC: 23 MMOL/L (ref 20–32)
CREAT SERPL-MCNC: 0.9 MG/DL (ref 0.52–1.04)
D DIMER PPP FEU-MCNC: 0.4 UG/ML FEU (ref 0–0.5)
DIFFERENTIAL METHOD BLD: NORMAL
EOSINOPHIL # BLD AUTO: 0.3 10E9/L (ref 0–0.7)
EOSINOPHIL NFR BLD AUTO: 4.1 %
ERYTHROCYTE [DISTWIDTH] IN BLOOD BY AUTOMATED COUNT: 14.8 % (ref 10–15)
FERRITIN SERPL-MCNC: 15 NG/ML (ref 12–150)
GFR SERPL CREATININE-BSD FRML MDRD: 88 ML/MIN/{1.73_M2}
GLUCOSE SERPL-MCNC: 79 MG/DL (ref 70–99)
HCT VFR BLD AUTO: 38.9 % (ref 35–47)
HGB BLD-MCNC: 12.6 G/DL (ref 11.7–15.7)
LYMPHOCYTES # BLD AUTO: 2.1 10E9/L (ref 0.8–5.3)
LYMPHOCYTES NFR BLD AUTO: 26.2 %
MCH RBC QN AUTO: 26.9 PG (ref 26.5–33)
MCHC RBC AUTO-ENTMCNC: 32.4 G/DL (ref 31.5–36.5)
MCV RBC AUTO: 83 FL (ref 78–100)
MONOCYTES # BLD AUTO: 0.6 10E9/L (ref 0–1.3)
MONOCYTES NFR BLD AUTO: 6.8 %
NEUTROPHILS # BLD AUTO: 5.1 10E9/L (ref 1.6–8.3)
NEUTROPHILS NFR BLD AUTO: 62.7 %
PLATELET # BLD AUTO: 208 10E9/L (ref 150–450)
POTASSIUM SERPL-SCNC: 3.7 MMOL/L (ref 3.4–5.3)
PROT SERPL-MCNC: 7.9 G/DL (ref 6.8–8.8)
RBC # BLD AUTO: 4.69 10E12/L (ref 3.8–5.2)
SODIUM SERPL-SCNC: 138 MMOL/L (ref 133–144)
WBC # BLD AUTO: 8.1 10E9/L (ref 4–11)

## 2019-12-06 PROCEDURE — 82728 ASSAY OF FERRITIN: CPT | Performed by: FAMILY MEDICINE

## 2019-12-06 PROCEDURE — 85379 FIBRIN DEGRADATION QUANT: CPT | Performed by: FAMILY MEDICINE

## 2019-12-06 PROCEDURE — 93000 ELECTROCARDIOGRAM COMPLETE: CPT | Performed by: FAMILY MEDICINE

## 2019-12-06 PROCEDURE — 80053 COMPREHEN METABOLIC PANEL: CPT | Performed by: FAMILY MEDICINE

## 2019-12-06 PROCEDURE — 85025 COMPLETE CBC W/AUTO DIFF WBC: CPT | Performed by: FAMILY MEDICINE

## 2019-12-06 PROCEDURE — 99214 OFFICE O/P EST MOD 30 MIN: CPT | Performed by: FAMILY MEDICINE

## 2019-12-06 PROCEDURE — 36415 COLL VENOUS BLD VENIPUNCTURE: CPT | Performed by: FAMILY MEDICINE

## 2019-12-06 RX ORDER — ACETAMINOPHEN AND CODEINE PHOSPHATE 120; 12 MG/5ML; MG/5ML
0.35 SOLUTION ORAL DAILY
Refills: 4 | COMMUNITY
Start: 2019-11-26 | End: 2021-10-15

## 2019-12-06 SDOH — HEALTH STABILITY: MENTAL HEALTH: HOW OFTEN DO YOU HAVE A DRINK CONTAINING ALCOHOL?: NEVER

## 2019-12-06 ASSESSMENT — PATIENT HEALTH QUESTIONNAIRE - PHQ9
SUM OF ALL RESPONSES TO PHQ QUESTIONS 1-9: 9
10. IF YOU CHECKED OFF ANY PROBLEMS, HOW DIFFICULT HAVE THESE PROBLEMS MADE IT FOR YOU TO DO YOUR WORK, TAKE CARE OF THINGS AT HOME, OR GET ALONG WITH OTHER PEOPLE: NOT DIFFICULT AT ALL
SUM OF ALL RESPONSES TO PHQ QUESTIONS 1-9: 9

## 2019-12-06 ASSESSMENT — ANXIETY QUESTIONNAIRES
7. FEELING AFRAID AS IF SOMETHING AWFUL MIGHT HAPPEN: MORE THAN HALF THE DAYS
2. NOT BEING ABLE TO STOP OR CONTROL WORRYING: SEVERAL DAYS
GAD7 TOTAL SCORE: 7
GAD7 TOTAL SCORE: 7
4. TROUBLE RELAXING: NOT AT ALL
1. FEELING NERVOUS, ANXIOUS, OR ON EDGE: SEVERAL DAYS
3. WORRYING TOO MUCH ABOUT DIFFERENT THINGS: SEVERAL DAYS
GAD7 TOTAL SCORE: 7
6. BECOMING EASILY ANNOYED OR IRRITABLE: MORE THAN HALF THE DAYS
5. BEING SO RESTLESS THAT IT IS HARD TO SIT STILL: NOT AT ALL
7. FEELING AFRAID AS IF SOMETHING AWFUL MIGHT HAPPEN: MORE THAN HALF THE DAYS

## 2019-12-06 NOTE — PATIENT INSTRUCTIONS
Chest pain does not sound cardiac  Extensive work up this past year with ekg, holter and cardiology ruled out cardiac cause  Given weight and use of  birth control though will chec k basic test today and if abnorml will recommend ct and refer to ER but suspect having costochondritis related to weight and sitting slouched and stress  PHsyical therapy and weight loss will help  Right leg fatigue does not sound like a blood clot and left knee sympstom suggestive of patella femoral syndrome  Recommend seeing Pt to evaluate and treat and can refer to ortho in the future  Will do an u/s to rule out blood clot   Weight loss will help knee and legs and wearing compression socks during the day will also help  Avod salt and alcohol and stay active  Asthma action plan given  see counsellor and psyche as recommended before for mental helath  go to the ER if worse in anyway  return for a preop for hysteroscopy  coming up  return later for a preventive physical   looks like pap done out side [place] in 2019 was normal   consider seeing the weight specialist and sleep to evaluet and treat you for  possble sleep apnea and help you loose wieght   These referrals were given earlier this year.

## 2019-12-06 NOTE — LETTER
December 9, 2019      Yael Johnson  [address]  [place] [address]        Dear ,    We are writing to inform you of your test results.    EKG Is normal      If you have any questions or concerns, please call the clinic at the number listed above.       Sincerely,        Angela Pelayo MD/nr

## 2019-12-06 NOTE — PROGRESS NOTES
Subjective     Yael Johnson is a 29 year old female who presents to clinic today for the following health issues:  Here with mother who drove her here ( does not drive)    Right leg pain and fatigue, worried about blood clot, left knee pops in and out of place x 1.5 years    HPI   Chest Pain    Onset: x 2 months    Description (location/character/radiation/duration): pinching sensation on left side and below breasts in middle    Intensity:  mild    Accompanying signs and symptoms:        Shortness of breath: YES- sometimes       Sweating: no        Nausea/vomiting: no        Palpitations: no        Other (fevers/chills/cough/heartburn/lightheadedness): no     History (similar episodes/previous evaluation): None    Precipitating or alleviating factors:       Worse with exertion: no        Worse with breathing: no        Related to eating: no        Better with burping: no     Therapies tried and outcome: None    Poking chest pain lasting few seconds. 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 Usually occurs in the inner aspect of the knee sometimes the outer aspect too. Going on a while. No swelling, redness or warmth. No issues walking on it. No locking or buckling.     Weight BMI 42, didn't see sleep or bariatrics, wants to hold off for now    Left knee been popping a while. No sound heard. But feel after sitting or squatting has to kind of pop knee to be brisa to get rid of pain and walk on it. No swelling, redness or warmth. No twisting injury or fall.     Asthma well controlled. ACT =25 . But wheezing today as didn't take her Advair this am as was rushing to get here.     Gigi/ MDD: feels doing better. Stopped alcohol and declines counseling, or meds for now. Declines Psyche eval.    AUB/ uterine polyp: will be returning 12/20 for preop for procedure in jan.     Hx of low ferritin and ok  with labs today     Has had no fever or chills, no headache or dizziness, no double or blurry vision, no facial pain, earache, sore throat, runny nose, post nasal drip, no trouble hearing, smelling, tasting or swallowing, no cough, no trouble breathing or palpitations, No abdominal pain, heart burn, reflux, nausea or vomiting or diarrhea or constipation, no blood in stools or black stools, no weight loss or night sweats. No dysuria, hematuria, frequency, urgency, hesitancy, incontinence, No pelvic complaints. No leg swelling or joint pain. No rash.     BACKGROUND  Hx of morbid obesity, BMI > 44, enlarged tonsils, moderate persistent asthma on albuterol inhaler & neb prn & Advair 100/50 bid, allergic to cats & dogs, FH of diabetes, prior left breast biopsy that was benign & still had  a lump in 2017, with hx of irregular menstrual cycle no longer on nuvaring, AUB on Micronor with mild improvement, u/S by gn outside [place] showed a endometrial polyp, low ferritin, palpitations, hx of JAYE, MDD, hx of SI, hx of a lot of cancellations & no shows,   Previously under care of PCP Wiley, Seen first time by this writer on 18 for vaginal odor & treated for BV with Metrogel. Std screen was negative. Had left knee pain, exam was benign & advised quad strengthening , weight loss & referred to ENT for enlarged tonsils. Cbc, CMP, lipids, TSH, hep B,C, HIV , syphilis & GC were negative. Seen 19 by Beto for sore throat , no infection seen. GC oral was negative. Was given nuvaring,  Advair dose increased but not started, remains stable on prior lower dosing.    Seen 3/8/19 for palpitations, aware of it since the middle of 2018. Symptoms started the day her brother in law  of a heart attack  & thought maybe was anxiety initially then worried about dying from a heart attack due to obesity. When first started palpitations it would last a couple days and then over time few hours, but when seen reported it  didn't t occur as frequently. Would usually note when she was lying down at night. Did not feel while at work or while walking to the bus unless had to run. Usually when occurred at night would say a prayer and go to bed and get up next am feeling fine. With the palpitations she had no associated symptoms. . She also noted her Mom was concerned about her having bipolar disorder. She & her friends did not think she had it but her Mom was concerned about bipolar and adult ADHD as mom had it. Noted periods of increased sexuality but no periods of getting by with no to less sleep & denied any visual or auditory hallucinations or psychosis.  Asthma was stable on Advair 100/50 & and albuterol prn. Noted did use her dads Symbicort inhaler when didn't have insurance & only used Albuterol neb if running out of meds. Exam was benign. Had no red flag symptom or signs. EKG showed NSR. Suspected anxiety and weight playing a role. Asthma was well controlled & continued on lower dosing of Advair.To work on lifestyle. Referred to psychology for diagnostic testing, counseling and then psyche if needed meds regarding concern about family hx of bipolar & ADHD. Referred to cardiology, & Holter ordered & referred to bariatrics. To consider seeing sleep in future given obesity & possible hx of snoring. Was to see gyn for irregular menstrual cycle and to do pap with them.   CMP, TSH, iron, HCG, HBA1c, CBC, was normal. Ferritin was lower end of normal and Holter was normal.  Seen by gyn 3/2019 for irregular menses, recurrent BV, Cx was normal, pap not done & progesterone was normal. No showed to follow up & cancelled apt 4/4/, 4/11, 4/17 & 4/18. Seen by cardiology 4/17 & reassured heart was normal & to work on loosing weight. Seen in the ER 8/10 after drinking alcohol with Suicidal ideation but evaluated and discharged home. Seen then by [facility] 8/30 for AUB & given progesterone only pill & pelvic u/S ordered , thought [facility] and Zeeshan signed to get report. Pelvic u/S 9/19 showed a 1.2 cm upper uterine segment polyp or submucosal fibroid & a 3.3cm simple right ovarian cyst. Seen by Gyn and PAP and GC obtained was normal. Seen by gyn 10/3/19 & planned for hysteroscopy polypectomy for AUB & u/S findings. Seen by [facility] on 10/25/19 for vaginal odor but wet prep was normal. Cancelled and rescheduled from 11/29/19.  [place]  negative.    Asthma Control Test 12/6/2019   ACT TOTAL SCORE    ACT Total Score (Goal Greater than or Equal to 20)    JAYE-7   [employer], 2002; Used with Permission) 12/6/2019   1. Feeling nervous, anxious, or on edge Several days   2. Not being able to stop or control worrying Several days   3. Worrying too much about different things Several days   4. Trouble relaxing Not at all   5. Being so restless that it is hard to sit still Not at all   6. Becoming easily annoyed or irritable More than half the days   7. Feeling afraid, as if something awful might happen More than half the days   JAYE 7 TOTAL SCORE 7 (mild anxiety)   1. Feeling nervous, anxious, or on edge 1   2. Not being able to stop or control worrying 1   3. Worrying too much about different things 1   4. Trouble relaxing 0   5. Being so restless that it is hard to sit still 0   6. Becoming easily annoyed or irritable 2   7. Feeling afraid, as if something awful might happen 2   JAYE-7 Total Score 7   If you checked any problems, how difficult have they made it for you to do your work, take care of things at home, or get along with other people?    PHQ-9 ([employer]) 12/6/2019   1.  Little interest or pleasure in doing things 1   2.  Feeling down, depressed, or hopeless 1   3.  Trouble falling or staying asleep, or sleeping too much 1   4.  Feeling tired or having little energy 3   5.  Poor appetite or overeating 2   6.  Feeling bad about yourself 1   7.  Trouble concentrating 0   8.  Moving slowly or restless 0   9.  Suicidal or self-harm  thoughts 0   PHQ-9 Total Score 9   Difficulty at work, home, or with people    1.  Little interest or pleasure in doing things Several days   2.  Feeling down, depressed, or hopeless Several days   3.  Trouble falling or staying asleep, or sleeping too much Several days   4.  Feeling tired or having little energy Nearly every day   5.  Poor appetite or overeating More than half the days   6.  Feeling bad about yourself Several days   7.  Trouble concentrating Not at all   8.  Moving slowly or restless Not at all   9.  Suicidal or self-harm thoughts Not at all   [facility] TOTAL SC[facility]-----> 9 (Mild depression)   Difficulty at work, home, or with people Not difficult at all     Patient Active Problem List   Diagnosis     Family history of diabetes mellitus     Enlarged tonsils     Moderate persistent asthma     Morbid obesity (H)     Irregular menstrual cycle     Palpitations     Low ferritin     JAYE (generalized anxiety disorder)     Major depressive disorder, remission status unspecified, unspecified whether recurrent     Abnormal uterine bleeding (AUB)     Uterine polyp     Past Surgical History:   Procedure Laterality Date     US BREAST BIOPSY LT  2017    benign, still has a lump       Social History     Tobacco Use     Smoking status: Never Smoker     Smokeless tobacco: Never Used   Substance Use Topics     Alcohol use: Not Currently     Frequency: Never     Comment: OCC     Family History   Problem Relation Age of Onset     Hypertension Mother      Diabetes Mother         type 2     Arthritis Mother      Bipolar Disorder Mother         per mom     Diabetes Maternal Grandmother         type 2     Cardiovascular Paternal Grandfather         Lung         Current Outpatient Medications   Medication Sig Dispense Refill     ADVAIR DISKUS 100-50 MCG/DOSE inhaler INHALE 1 PUFF INTO THE LUNGS EVERY 12 HOURS 1 Inhaler 0     albuterol (2.5 MG/3ML) 0.083% nebulizer solution Take 1 vial (2.5 mg) by nebulization every  6 hours as needed for shortness of breath / dyspnea 1 Box 11     albuterol (PROAIR HFA/PROVENTIL HFA/VENTOLIN HFA) 108 (90 Base) MCG/ACT Inhaler Inhale 2 puffs into the lungs every 6 hours as needed for shortness of breath / dyspnea or wheezing 1 Inhaler 1     norethindrone (MICRONOR) 0.35 MG tablet Take 0.35 mg by mouth daily  4     Allergies   Allergen Reactions     Cats      Dogs      Recent Labs   Lab Test 12/06/19  1419 03/08/19  1232 05/31/18  1458  05/09/16  1606   A1C  --  5.3 5.5  --   --    LDL  --   --  52  --  70   HDL  --   --  29*  --  30*   TRIG  --   --  73  --  69   ALT 19 18 22  --  20   CR 0.90 0.76 0.73  --  1.02   GFRESTIMATED 88 >90 >90  --  67   GFRESTBLACK >90 >90 >90  --  81   POTASSIUM 3.7 4.0 3.5  --  3.8   TSH  --  0.58 0.55   < > 0.82    < > = values in this interval not displayed.      BP Readings from Last 3 Encounters:   12/06/19 138/72   08/10/19 116/74   04/17/19 127/84    Wt Readings from Last 3 Encounters:   12/06/19 116.1 kg (256 lb)   08/10/19 114.4 kg (252 lb 3.2 oz)   04/17/19 121 kg (266 lb 12.8 oz)                    Reviewed and updated as needed this visit by Provider  Tobacco  Allergies  Meds  Med Hx  Surg Hx  Fam Hx  Soc Hx        Review of Systems   ROS COMP: Constitutional, HEENT, cardiovascular, pulmonary, GI, , musculoskeletal, neuro, skin, endocrine and psych systems are negative, except as otherwise noted.      Objective    /72 (BP Location: Left arm, Patient Position: Sitting, Cuff Size: Adult Large)   Pulse 82   Temp 97.3  F (36.3  C) (Tympanic)   Resp 14   Wt 116.1 kg (256 lb)   SpO2 99%   Breastfeeding No   BMI 42.60 kg/m    Body mass index is 42.6 kg/m .  Physical Exam   GENERAL: healthy, alert, no distress and obese  EYES: Eyes grossly normal to inspection, PERRL and conjunctivae and sclerae normal  HENT: ear canals and TM's normal, nose and mouth without ulcers or lesions  NECK: no adenopathy, no asymmetry, masses, or scars and thyroid  normal to palpation  RESP: no rales , no rhonchi and expiratory wheezes R lower posterior and L lower posterior  CV: regular rate and rhythm, normal S1 S2, no S3 or S4, no murmur, click or rub, no peripheral edema and peripheral pulses strong  ABDOMEN: soft, non tender, no hepatosplenomegaly, no masses and bowel sounds normal  MS: no gross musculoskeletal defects noted, no edema  SKIN: no suspicious lesions or rashes  NEURO: Normal strength and tone, mentation intact and speech normal  PSYCH: mentation appears normal, affect normal/bright    Diagnostic Test Results:  Labs reviewed in Epic  No results found for this or any previous visit (from the past 24 hour(s)).        Assessment & Plan     1. Atypical chest pain  Chest pain does not sound cardiac. Extensive work up this past year with EKG, Holter and cardiology ruled out cardiac cause. Likely costochondritis. Given weight and use of  birth control though will check basic tests today and if abnormal will recommend ct and refer to ER but suspect having costochondritis related to weight and sitting slouched and stress. Physical therapy and weight loss will help.   Right leg fatigue does not sound like a blood clot and left knee symptoms suggestive of patella femoral syndrome  Recommend seeing PT to evaluate and treat and can refer to ortho in the future. Will do an u/S to rule out blood clot. Weight loss will help knee and legs and wearing compression socks during the day will also help. Avoid salt and alcohol and stay active  Asthma reported well controlled but wheezing heard and reports didn't take her meds this am. Given herself an inhaler while in the room. Asthma action plan given  Sylvie/ MDD stable, currently declines counseling or meds. Can con consider to see Counsellor and psyche as recommended before for mental health as needed.   Go to the ER if worse in anyway  Return for a preop for hysteroscopy  coming up for AUB and uterine polyp.  Will return later for  a preventive physical, looks like pap done out side [place] in 2019 was normal   To consider seeing the bariatrics and sleep to evaluate and treat for  possible sleep apnea and help loose weight   These referrals were given earlier this year.  - EKG 12-lead complete w/read - Clinics  - XR Chest 2 Views; Future  - CBC with platelets differential  - Comprehensive metabolic panel  - D dimer, quantitative  - [facility]; Future    2. Palpitations  Reports resolved.   - CBC with platelets differential  - Comprehensive metabolic panel    3. 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 Likely from sedentary state. See PT to further evaluate and treat. Currently exam is normal and less likely to be from a DVT but given BMI and progesterone use will do a doppler to be sure.   - CBC with platelets differential  - Comprehensive metabolic panel  - D dimer, quantitative  - [facility] REFERRAL; Future  - US Lower Extremity Venous Duplex Right; Future    4. Morbid obesity (H)  Lifestyle, diet, exercise discussed. Discussed bariatrics and sleep eval. Can consider after procedure done.   - Comprehensive metabolic panel    5. Left knee pain, unspecified chronicity  Exam benign. Suspect patellofemoral syndrome. Weight loss will help. PT to evaluate.  - [facility] REFERRAL; Future  - ORTHO  REFERRAL    6. Moderate persistent asthma without complication  Despite wheezing today reports well controlled. Continued same management and encouraged not to forget to use inhalers.   - Asthma Action Plan (AAP)    7. JAYE (generalized anxiety disorder)  8. Major depressive disorder, remission status unspecified, unspecified whether recurrent  Reports better. Declines counseling and psyche eval for now. Will be avoiding alcohol which made her feel worse.     9. Abnormal uterine bleeding (AUB)  10. Uterine polyp  Will be returning for preop on 12/20 for  "procedure on 1/3.    11. Low ferritin  - Ferritin     BMI:   Estimated body mass index is 42.6 kg/M  as calculated from the following:    Height as of 8/10/19: 1.651 M (5' 5\").    Weight as of this encounter: 116.1 kg (256 lb).   Weight management plan: Discussed healthy diet and exercise guidelines  Work on weight loss  Regular exercise  See Patient Instructions    Return in about 4 weeks (around 1/3/2020) for Routine Visit for chronic issues with PCP.    Angela Pelayo MD  [facility]        "

## 2019-12-06 NOTE — LETTER
My Asthma Action Plan    Name: Yael Johnson   YOB: 1990  Date: 12/6/2019   My doctor: Angela Pelayo MD   My clinic: [facility]        My Control Medicine: Fluticasone propionate + salmeterol (Advair Diskus or Wixela Inhub) -  100/50 mcg 1 puff twice a day  My Rescue Medicine: Albuterol (Proair/Ventolin/Proventil HFA) 2-4 puffs EVERY 4 HOURS as needed. Use a spacer if recommended by your provider.   My Asthma Severity:   Mild Persistent  Know your asthma triggers: Patient is unaware of triggers               GREEN ZONE   Good Control    I feel good    No cough or wheeze    Can work, sleep and play without asthma symptoms       Take your asthma control medicine every day.     1. If exercise triggers your asthma, take your rescue medication    15 minutes before exercise or sports, and    During exercise if you have asthma symptoms  2. Spacer to use with inhaler: If you have a spacer, make sure to use it with your inhaler             YELLOW ZONE Getting Worse  I have ANY of these:    I do not feel good    Cough or wheeze    Chest feels tight    Wake up at night   1. Keep taking your Green Zone medications  2. Start taking your rescue medicine:    every 20 minutes for up to 1 hour. Then every 4 hours for 24-48 hours.  3. If you stay in the Yellow Zone for more than 12-24 hours, contact your doctor.  4. If you do not return to the Green Zone in 12-24 hours or you get worse, start taking your oral steroid medicine if prescribed by your provider.           RED ZONE Medical Alert - Get Help  I have ANY of these:    I feel awful    Medicine is not helping    Breathing getting harder    Trouble walking or talking    Nose opens wide to breathe       1. Take your rescue medicine NOW  2. If your provider has prescribed an oral steroid medicine, start taking it NOW  3. Call your doctor NOW  4. If you are still in the Red Zone after 20 minutes and you have not reached your doctor:    Take your  rescue medicine again and    Call 911 or go to the emergency room right away    See your regular doctor within 2 weeks of an Emergency Room or Urgent Care visit for follow-up treatment.          Annual Reminders:  Meet with Asthma Educator,  Flu Shot in the Fall, consider Pneumonia Vaccination for patients with asthma (aged 19 and older).    Pharmacy:    [place], [place] - [address]  [id number] - [place], [place] - [address]    Electronically signed by Angela Pelayo MD   Date: 12/06/19                      Asthma Triggers  How To Control Things That Make Your Asthma Worse    Triggers are things that make your asthma worse.  Look at the list below to help you find your triggers and what you can do about them.  You can help prevent asthma flare-ups by staying away from your triggers.      Trigger                                                          What you can do   Cigarette Smoke  Tobacco smoke can make asthma worse. Do not allow smoking in your home, car or around you.  Be sure no one smokes at a child s day care or school.  If you smoke, ask your health care provider for ways to help you quit.  Ask family members to quit too.  Ask your health care provider for a referral to Quit Plan to help you quit smoking, or call 8-127-232-PLAN.     Colds, Flu, Bronchitis  These are common triggers of asthma. Wash your hands often.  Don t touch your eyes, nose or mouth.  Get a flu shot every year.     Dust Mites  These are tiny bugs that live in cloth or carpet. They are too small to see. Wash sheets and blankets in hot water every week.   Encase pillows and mattress in dust mite proof covers.  Avoid having carpet if you can. If you have carpet, vacuum weekly.   Use a dust mask and HEPA vacuum.   Pollen and Outdoor Mold  Some people are allergic to trees, grass, or weed pollen, or molds. Try to keep your windows closed.  Limit time out doors when pollen count is  high.   Ask you health care provider about taking medicine during allergy season.     Animal Dander  Some people are allergic to skin flakes, urine or saliva from pets with fur or feathers. Keep pets with fur or feathers out of your home.    If you can t keep the pet outdoors, then keep the pet out of your bedroom.  Keep the bedroom door closed.  Keep pets off cloth furniture and away from stuffed toys.     Mice, Rats, and Cockroaches   Some people are allergic to the waste from these pests.   Cover food and garbage.  Clean up spills and food crumbs.  Store grease in the refrigerator.   Keep food out of the bedroom.   Indoor Mold  This can be a trigger if your home has high moisture. Fix leaking faucets, pipes, or other sources of water.   Clean moldy surfaces.  Dehumidify basement if it is damp and smelly.   Smoke, Strong Odors, and Sprays  These can reduce air quality. Stay away from strong odors and sprays, such as perfume, powder, hair spray, paints, smoke incense, paint, cleaning products, candles and new carpet.   Exercise or Sports  Some people with asthma have this trigger. Be active!  Ask your doctor about taking medicine before sports or exercise to prevent symptoms.    Warm up for 5-10 minutes before and after sports or exercise.     Other Triggers of Asthma  Cold air:  Cover your nose and mouth with a scarf.  Sometimes laughing or crying can be a trigger.  Some medicines and food can trigger asthma.

## 2019-12-07 ASSESSMENT — ASTHMA QUESTIONNAIRES: ACT_TOTALSCORE: 24

## 2019-12-07 ASSESSMENT — PATIENT HEALTH QUESTIONNAIRE - PHQ9: SUM OF ALL RESPONSES TO PHQ QUESTIONS 1-9: 9

## 2019-12-07 ASSESSMENT — ANXIETY QUESTIONNAIRES: GAD7 TOTAL SCORE: 7

## 2019-12-07 NOTE — RESULT ENCOUNTER NOTE
Lyle Ms. Johnson,  Your results came back and are within acceptable limits. -Liver and gallbladder tests are normal (ALT,AST, Alk phos, bilirubin), kidney function is normal (Cr, GFR), sodium is normal, potassium is normal, calcium is normal, glucose is normal.  -Ferritin (iron) level is normal. But could be better so try to eat more iron in your diet or tae an iron pill over the counter  D dimer was normal so risk of a blood clot is low.  . If you have any further concerns please do not hesitate to contact us by message, phone or making an appointment.  Have a good day   Dr Pierce XAVIER

## 2019-12-09 ENCOUNTER — ANCILLARY PROCEDURE (OUTPATIENT)
Dept: ULTRASOUND IMAGING | Facility: CLINIC | Age: 29
End: 2019-12-09
Attending: FAMILY MEDICINE
Payer: COMMERCIAL

## 2019-12-09 DIAGNOSIS — R29.898 LEG FATIGUE: ICD-10-CM

## 2019-12-09 NOTE — TELEPHONE ENCOUNTER
PHQ-9 score:    PHQ-9 SCORE 12/6/2019   PHQ-9 Total Score MyChart 9 (Mild depression)   PHQ-9 Total Score 9       ACT Total Scores 3/8/2019 9/20/2019 12/6/2019   ACT TOTAL SCORE - - -   ASTHMA ER VISITS - - -   ASTHMA HOSPITALIZATIONS - - -   ACT TOTAL SCORE (Goal Greater than or Equal to 20) 23 21 24   In the past 12 months, how many times did you visit the emergency room for your asthma without being admitted to the hospital? 0 0 0   In the past 12 months, how many times were you hospitalized overnight because of your asthma? 0 0 0       9/20/19 pap smear is listed on Epic    See note to pt    Pretty Issa, RN, BSN

## 2019-12-10 ENCOUNTER — MYC MEDICAL ADVICE (OUTPATIENT)
Dept: FAMILY MEDICINE | Facility: CLINIC | Age: 29
End: 2019-12-10

## 2019-12-10 NOTE — TELEPHONE ENCOUNTER
DIAGNOSIS: Left knee pain,/no img/Angela Pelayo MD/[place] [employer]/ortho con   APPOINTMENT DATE: 12.19.19   NOTES STATUS DETAILS   OFFICE NOTE from referring provider Internal 12.6.19 Dr. Pleayo   OFFICE NOTE from other specialist N/A    DISCHARGE SUMMARY from hospital N/A    DISCHARGE REPORT from the ER N/A    OPERATIVE REPORT N/A    MEDICATION LIST Internal    IMPLANT RECORD/STICKER N/A    LABS     CBC/DIFF Internal 12.6.19   CULTURES N/A    INJECTIONS DONE IN RADIOLOGY N/A    MRI N/A    CT SCAN N/A    XRAYS (IMAGES & REPORTS) N/A    TUMOR     PATHOLOGY  Slides & report N/A

## 2019-12-10 NOTE — RESULT ENCOUNTER NOTE
Lyle Johnson,  Your results came back and are within acceptable limits.  No blood clot seen  Old bruise noted corelates to bruise right lower shin of leg.  If you have any further concerns please do not hesitate to contact us by message, phone or making an appointment.  Have a good day   Dr Pierce XAVIER

## 2019-12-11 NOTE — TELEPHONE ENCOUNTER
Dr. Pelayo-Please review.  Writer planned on informing patient in time bruise will heal, but it can take months for deep bruising to fully resolve.    Thank you!  GUILLE GilbertN, RN

## 2019-12-17 NOTE — PROGRESS NOTES
Pt is a 29 year old female referred by Dr Pelayo here today for: left knee pain when her patella subluxes.     HPI:   Left Knee:   Duration? 1 year, 10 incidents  Injury/ Inciting activity? Squatting incidents   Pop? NA   Swelling? No  Limited motion? Yes  Locking/ Catching? No   Giving way/ instability?  Yes   Imaging? No   Treatment? No     Pops out of place and then pops back in   Bounces while she is in a squat and it goes back in to place      Past Medical History:   Diagnosis Date     Asthma      Enlarged tonsils       Past Surgical History:   Procedure Laterality Date     US BREAST BIOPSY LT  2017    benign, still has a lump      Current Outpatient Medications   Medication Sig Dispense Refill     ADVAIR DISKUS 100-50 MCG/DOSE inhaler INHALE 1 PUFF INTO THE LUNGS EVERY 12 HOURS 1 Inhaler 0     albuterol (2.5 MG/3ML) 0.083% nebulizer solution Take 1 vial (2.5 mg) by nebulization every 6 hours as needed for shortness of breath / dyspnea 1 Box 11     albuterol (PROAIR HFA/PROVENTIL HFA/VENTOLIN HFA) 108 (90 Base) MCG/ACT Inhaler Inhale 2 puffs into the lungs every 6 hours as needed for shortness of breath / dyspnea or wheezing 1 Inhaler 1     norethindrone (MICRONOR) 0.35 MG tablet Take 0.35 mg by mouth daily  4      Allergies   Allergen Reactions     Cats      Dogs       Social History     Tobacco Use     Smoking status: Never Smoker     Smokeless tobacco: Never Used   Substance Use Topics     Alcohol use: Not Currently     Frequency: Never     Comment: OCC     Drug use: No      Family History   Problem Relation Age of Onset     Hypertension Mother      Diabetes Mother         type 2     Arthritis Mother      Bipolar Disorder Mother         per mom     Diabetes Maternal Grandmother         type 2     Cardiovascular Paternal Grandfather         Lung      ROS:   Gen- no fevers/chills   Rheum - no morning stiffness   Derm - no rash/ redness   Neuro - no numbness, no tingling   Remainder of ROS negative.     Exam:  "  Ht 1.651 m (5' 5\")   Wt 113.9 kg (251 lb)   BMI 41.77 kg/m         L Knee:   ROM: 0-130; Crepitus: No   Effusion: No ; Swelling: No   Strength: Full in flexion/ extension   Tenderness: Patella - NO Medial joint line - NO; Lateral joint line - NO; Quad tendon - no; Patellar tendon- no; Hamstring - no.   Cruciates: anterior drawer - neg/posterior drawer -neg. Lachman - neg   Collaterals: varus -neg/valgus -neg.   Patella: patellar compression - neg; single leg bend- neg   Meniscus: Daphne - neg; Thessaly - neg   Maneuvers: Arile - neg     Xray knee - 12/19/19    Neg    (S83.002A) Patellar subluxation, left, initial encounter  (primary encounter diagnosis)  Comment: exam/ imaging fairly benign today; will have her rehab; if no better, consider further imaging; f/u in 4 wks  Plan: XR Knee Left 3 Views, PHYSICAL THERAPY REFERRAL        (Internal)            Ernst Ruiz MD  December 19, 2019  12:38 PM      "

## 2019-12-19 ENCOUNTER — OFFICE VISIT (OUTPATIENT)
Dept: ORTHOPEDICS | Facility: CLINIC | Age: 29
End: 2019-12-19
Payer: COMMERCIAL

## 2019-12-19 ENCOUNTER — ANCILLARY PROCEDURE (OUTPATIENT)
Dept: GENERAL RADIOLOGY | Facility: CLINIC | Age: 29
End: 2019-12-19
Attending: FAMILY MEDICINE
Payer: COMMERCIAL

## 2019-12-19 ENCOUNTER — PRE VISIT (OUTPATIENT)
Dept: ORTHOPEDICS | Facility: CLINIC | Age: 29
End: 2019-12-19

## 2019-12-19 VITALS — WEIGHT: 251 LBS | HEIGHT: 65 IN | BODY MASS INDEX: 41.82 KG/M2

## 2019-12-19 DIAGNOSIS — S83.002A PATELLAR SUBLUXATION, LEFT, INITIAL ENCOUNTER: Primary | ICD-10-CM

## 2019-12-19 DIAGNOSIS — S83.002A PATELLAR SUBLUXATION, LEFT, INITIAL ENCOUNTER: ICD-10-CM

## 2019-12-19 ASSESSMENT — MIFFLIN-ST. JEOR: SCORE: 1874.41

## 2019-12-19 NOTE — LETTER
12/19/2019      RE: Yael KO Johnson  [address]  [place] [address]       Pt is a 29 year old female referred by Dr Pelayo here today for: left knee pain when her patella subluxes.     HPI:   Left Knee:   Duration? 1 year, 10 incidents  Injury/ Inciting activity? Squatting incidents   Pop? NA   Swelling? No  Limited motion? Yes  Locking/ Catching? No   Giving way/ instability?  Yes   Imaging? No   Treatment? No     Pops out of place and then pops back in   Bounces while she is in a squat and it goes back in to place      Past Medical History:   Diagnosis Date     Asthma      Enlarged tonsils       Past Surgical History:   Procedure Laterality Date     US BREAST BIOPSY LT  2017    benign, still has a lump      Current Outpatient Medications   Medication Sig Dispense Refill     ADVAIR DISKUS 100-50 MCG/DOSE inhaler INHALE 1 PUFF INTO THE LUNGS EVERY 12 HOURS 1 Inhaler 0     albuterol (2.5 MG/3ML) 0.083% nebulizer solution Take 1 vial (2.5 mg) by nebulization every 6 hours as needed for shortness of breath / dyspnea 1 Box 11     albuterol (PROAIR HFA/PROVENTIL HFA/VENTOLIN HFA) 108 (90 Base) MCG/ACT Inhaler Inhale 2 puffs into the lungs every 6 hours as needed for shortness of breath / dyspnea or wheezing 1 Inhaler 1     norethindrone (MICRONOR) 0.35 MG tablet Take 0.35 mg by mouth daily  4      Allergies   Allergen Reactions     Cats      Dogs       Social History     Tobacco Use     Smoking status: Never Smoker     Smokeless tobacco: Never Used   Substance Use Topics     Alcohol use: Not Currently     Frequency: Never     Comment: OCC     Drug use: No      Family History   Problem Relation Age of Onset     Hypertension Mother      Diabetes Mother         type 2     Arthritis Mother      Bipolar Disorder Mother         per mom     Diabetes Maternal Grandmother         type 2     Cardiovascular Paternal Grandfather         Lung      ROS:   Gen- no fevers/chills   Rheum - no morning stiffness  "  Derm - no rash/ redness   Neuro - no numbness, no tingling   Remainder of ROS negative.     Exam:   Ht 1.651 m (5' 5\")   Wt 113.9 kg (251 lb)   BMI 41.77 kg/m          L Knee:   ROM: 0-130; Crepitus: No   Effusion: No ; Swelling: No   Strength: Full in flexion/ extension   Tenderness: Patella - NO Medial joint line - NO; Lateral joint line - NO; Quad tendon - no; Patellar tendon- no; Hamstring - no.   Cruciates: anterior drawer - neg/posterior drawer -neg. Lachman - neg   Collaterals: varus -neg/valgus -neg.   Patella: patellar compression - neg; single leg bend- neg   Meniscus: Daphne - neg; Thessaly - neg   Maneuvers: Ariel - neg     Xray knee - 12/19/19    Neg    (S83.002A) Patellar subluxation, left, initial encounter  (primary encounter diagnosis)  Comment: exam/ imaging fairly benign today; will have her rehab; if no better, consider further imaging; f/u in 4 wks  Plan: XR Knee Left 3 Views, PHYSICAL THERAPY REFERRAL        (Internal)            Ernst Ruiz MD  December 19, 2019  12:38 PM        Ernst Ruiz MD    "

## 2019-12-20 PROBLEM — R00.2 PALPITATIONS: Status: RESOLVED | Noted: 2019-04-11 | Resolved: 2019-12-20

## 2019-12-20 PROBLEM — R79.0 LOW FERRITIN: Status: RESOLVED | Noted: 2019-04-11 | Resolved: 2019-12-20

## 2019-12-20 PROBLEM — S83.002S: Status: ACTIVE | Noted: 2019-12-20

## 2020-01-02 ENCOUNTER — THERAPY VISIT (OUTPATIENT)
Dept: PHYSICAL THERAPY | Facility: CLINIC | Age: 30
End: 2020-01-02
Payer: COMMERCIAL

## 2020-01-02 DIAGNOSIS — M25.562 LEFT KNEE PAIN, UNSPECIFIED CHRONICITY: ICD-10-CM

## 2020-01-02 PROCEDURE — 97110 THERAPEUTIC EXERCISES: CPT | Mod: GP | Performed by: PHYSICAL THERAPIST

## 2020-01-02 PROCEDURE — 97530 THERAPEUTIC ACTIVITIES: CPT | Mod: GP | Performed by: PHYSICAL THERAPIST

## 2020-01-02 PROCEDURE — 97161 PT EVAL LOW COMPLEX 20 MIN: CPT | Mod: GP | Performed by: PHYSICAL THERAPIST

## 2020-01-02 NOTE — PROGRESS NOTES
"  PHYSICAL THERAPIST IMPRESSION: Yael presents to physical therapy with complaints of left knee instability. There was no trauma or injury that she can recall but began to notice this instability approximately 1 year ago. The positions where she feels the instability is in a flexed position and to \"relocate\" her instability, she moves into a deep squat. She never has fallen due to her instability and she does not report swelling. Her anterior knee anatomy is difficult to assess due to patient's body habitus. Patient's signs and symptoms are not consistent with patella femoral instability but are questionable for meniscus pathology. Skilled physical therapy is necessary to improve her proximal hip and core strength as well as her movement patterns.     RESPONSIVENESS TO GAMBLE TAPE: not performed today as patient does not have daily, consistent symptoms       PT MODIFIABLE FACTORS PRESENT NOT PRESENT   Proximal LE/Trunk mm weakness X    Quadriceps muscle dysfunction/weakness X    Poor postural stability X    Poor dynamic movement patterns X    Restricted ankle DF X    Previous PF PT Interventions  X     PATIENT BONY ALIGNMENT SUSPICIOUS FOR: (to be determined by MD and imaging studies):    Limb version - difficult to assess patellar position in standing/supine, questionable      Physical Therapy Initial Evaluation: Subjective History     Injury/Condition Details:  Presenting Complaint Left knee instabiity   Onset Timing/Date Started approximately 1 year ago   Mechanism Around that time was doing dance work out classes. There was no injury or trauma that she can recall.     The first episode happened when she was sitting at her desk at work and went to stand up. She has found that if she \"drops into a squat and bounces\" she can get her knee to go back in. She has figured out what causes her knee to go out and so she avoids those things.      Symptom Behavior Details    Primary Symptoms Sporadic symptoms; " "Activity/position dependent, pain (Location: lateral knee, not pinpoint, Quality: Sharp), stiffness, catching/locking   No swelling, no pain unless it is \"out of place\"   Worst Pain 7/10 (with an episode of instability)   Symptom Provocators Putting on socks/shoes  Moving her knee to the left  Nervousness with sitting vickey cross applesauce  Rotating her knee to the left  No falls due to her knee   Best Pain 0/10    Symptom Relievers Squatting/bouncing in a low squat   Time of day dependent? No   Recent symptom change? no change in symptoms     Prior Testing/Intervention for current condition:  Prior Tests  x-ray   Prior Treatment none     Lifestyle & General Medical History:  Employment [email or web address]   Usual physical activities  (within past year) Fitness dancing/Jewels/Hip Hop   Orthopaedic history See Epic Chart   Notable medical history See Epic Chart   Patient Reported Health good       Lower Extremity Exam    Dynamic Movement Screen:  2 leg stance: Equal weight bearing, difficult to assess patellar positioning in standing due to body habitus   2 leg squat:Excessive anterior knee excursion (reduced posterior hip excursion), Restricted ankle DF observed and able to complete without complaints of instability    1 leg stance:   Right: excessive lateral trunk lean over stance limb  Left: excessive lateral trunk lean over stance limb    1 leg squat: will measure depth at next visit. Verbally reported to feel weaker  Right: excessive anterior knee excursion and restricted ankle DF noted  Left: proprioceptive challenge, excessive anterior knee excursion and restricted ankle DF noted    Gait: Left foot more in external rotation with gait cycle. Non antalgic.     Patellofemoral Joint Examination:  Test Right Left   Patellar Orientation:   90 deg flexion Mild lateral tilt Mild lateral tilt   OKC Patellar Tracking Normal Crepitus   Patellar Orientation:  0 deg flexion Mild lateral tilt " and Mild lateral translation Mild lateral tilt and Mild lateral translation   Quadrant Mobility (Med:Lat) 2:1  2:2 with apprehension   Effusion 0 0   Quad Activation Good Good     Knee Joint AROM: symmetrical  Palpation:   Tender to palpation at the following structures: lateral joint line    Hip Joint PROM Screen: had apprehension to external rotation at the hip at 90 degrees of flexion, did not report the same rotation with prone ER at the hip   Right Left Difference   Flex 100 deg 100 deg - deg   ER 60 deg - deg - deg   IR 40 deg 40 deg - deg     Lower Extremity Muscle Strength (x/5)   Right Left   Hip ER 4/5 4/5   Hip IR 4/5 4/5   Hip ABD 4-/5 4-/5   Hip ADD NT/5 NT/5   Hip Ext 4-/5 4-/5   Knee Flex 4+/5 4+/5     Lower Extremity Flexibility Screen:   Right Left   Hamstring + +   SARATH - -   Goalie Stretch - -   Hip Flexor - -   ITB/Lat Hip in SL - -   Quadriceps + +   Gastroc/ Soleus ++ ++   - = normal  + = mild tightness  ++ = moderate tightness  +++ = significant tightness      Assessment/Plan:  Patient is a 29 year old female with left side knee complaints.    Patient has the following significant findings with corresponding treatment plan.                Diagnosis 1:  Left knee pain/instability  Pain -  hot/cold therapy, manual therapy, STS, splint/taping/bracing/orthotics, self management and education  Decreased ROM/flexibility - manual therapy, therapeutic exercise and home program  Decreased strength - therapeutic exercise, therapeutic activities and home program  Decreased proprioception - neuro re-education, therapeutic activities and home program  Decreased function - therapeutic activities and home program    Therapy Evaluation Codes:   1) History comprised of:   Personal factors that impact the plan of care:      None.    Comorbidity factors that impact the plan of care are:      None.     Medications impacting care: None.  2) Examination of Body Systems comprised of:   Body structures and functions  that impact the plan of care:      Knee.   Activity limitations that impact the plan of care are:      Dressing, Sports and Squatting/kneeling.  3) Clinical presentation characteristics are:   Stable/Uncomplicated.  4) Decision-Making    Low complexity using standardized patient assessment instrument and/or measureable assessment of functional outcome.  Cumulative Therapy Evaluation is: Low complexity.    Previous and current functional limitations:  (See Goal Flow Sheet for this information)    Short term and Long term goals: (See Goal Flow Sheet for this information)     Communication ability:  Patient appears to be able to clearly communicate and understand verbal and written communication and follow directions correctly.  Treatment Explanation - The following has been discussed with the patient:   RX ordered/plan of care  Anticipated outcomes  Possible risks and side effects  This patient would benefit from PT intervention to resume normal activities.   Rehab potential is good.    Frequency:  1 X week, once daily  Duration:  for 4 weeks  Discharge Plan:  Achieve all LTG.  Independent in home treatment program.  Reach maximal therapeutic benefit.    Please refer to the daily flowsheet for treatment today, total treatment time and time spent performing 1:1 timed codes.

## 2020-01-09 ENCOUNTER — THERAPY VISIT (OUTPATIENT)
Dept: PHYSICAL THERAPY | Facility: CLINIC | Age: 30
End: 2020-01-09
Payer: COMMERCIAL

## 2020-01-09 DIAGNOSIS — M25.562 LEFT KNEE PAIN, UNSPECIFIED CHRONICITY: ICD-10-CM

## 2020-01-09 PROCEDURE — 97110 THERAPEUTIC EXERCISES: CPT | Mod: GP | Performed by: PHYSICAL THERAPY ASSISTANT

## 2020-01-10 ENCOUNTER — OFFICE VISIT (OUTPATIENT)
Dept: FAMILY MEDICINE | Facility: CLINIC | Age: 30
End: 2020-01-10
Payer: COMMERCIAL

## 2020-01-10 VITALS
TEMPERATURE: 97.3 F | WEIGHT: 256.75 LBS | HEIGHT: 65 IN | RESPIRATION RATE: 15 BRPM | HEART RATE: 87 BPM | SYSTOLIC BLOOD PRESSURE: 122 MMHG | DIASTOLIC BLOOD PRESSURE: 72 MMHG | OXYGEN SATURATION: 93 % | BODY MASS INDEX: 42.78 KG/M2

## 2020-01-10 DIAGNOSIS — E66.01 MORBID OBESITY (H): ICD-10-CM

## 2020-01-10 DIAGNOSIS — N84.0 UTERINE POLYP: ICD-10-CM

## 2020-01-10 DIAGNOSIS — F32.9 MAJOR DEPRESSIVE DISORDER, REMISSION STATUS UNSPECIFIED, UNSPECIFIED WHETHER RECURRENT: ICD-10-CM

## 2020-01-10 DIAGNOSIS — J45.40 MODERATE PERSISTENT ASTHMA WITHOUT COMPLICATION: ICD-10-CM

## 2020-01-10 DIAGNOSIS — G44.219 EPISODIC TENSION-TYPE HEADACHE, NOT INTRACTABLE: ICD-10-CM

## 2020-01-10 DIAGNOSIS — N93.9 ABNORMAL UTERINE BLEEDING (AUB): ICD-10-CM

## 2020-01-10 DIAGNOSIS — F41.1 GAD (GENERALIZED ANXIETY DISORDER): ICD-10-CM

## 2020-01-10 DIAGNOSIS — N92.6 IRREGULAR MENSTRUAL CYCLE: ICD-10-CM

## 2020-01-10 DIAGNOSIS — Z01.818 PRE-OPERATIVE GENERAL PHYSICAL EXAMINATION: Primary | ICD-10-CM

## 2020-01-10 PROCEDURE — 99214 OFFICE O/P EST MOD 30 MIN: CPT | Performed by: FAMILY MEDICINE

## 2020-01-10 RX ORDER — NAPROXEN 500 MG/1
TABLET ORAL
Qty: 20 TABLET | Refills: 0 | Status: SHIPPED | OUTPATIENT
Start: 2020-01-10 | End: 2020-06-08

## 2020-01-10 ASSESSMENT — ANXIETY QUESTIONNAIRES
1. FEELING NERVOUS, ANXIOUS, OR ON EDGE: SEVERAL DAYS
4. TROUBLE RELAXING: NOT AT ALL
2. NOT BEING ABLE TO STOP OR CONTROL WORRYING: NOT AT ALL
6. BECOMING EASILY ANNOYED OR IRRITABLE: NOT AT ALL
7. FEELING AFRAID AS IF SOMETHING AWFUL MIGHT HAPPEN: SEVERAL DAYS
GAD7 TOTAL SCORE: 2
3. WORRYING TOO MUCH ABOUT DIFFERENT THINGS: NOT AT ALL
GAD7 TOTAL SCORE: 2
7. FEELING AFRAID AS IF SOMETHING AWFUL MIGHT HAPPEN: SEVERAL DAYS
5. BEING SO RESTLESS THAT IT IS HARD TO SIT STILL: NOT AT ALL
GAD7 TOTAL SCORE: 2

## 2020-01-10 ASSESSMENT — PATIENT HEALTH QUESTIONNAIRE - PHQ9
10. IF YOU CHECKED OFF ANY PROBLEMS, HOW DIFFICULT HAVE THESE PROBLEMS MADE IT FOR YOU TO DO YOUR WORK, TAKE CARE OF THINGS AT HOME, OR GET ALONG WITH OTHER PEOPLE: SOMEWHAT DIFFICULT
SUM OF ALL RESPONSES TO PHQ QUESTIONS 1-9: 8
SUM OF ALL RESPONSES TO PHQ QUESTIONS 1-9: 8

## 2020-01-10 ASSESSMENT — MIFFLIN-ST. JEOR: SCORE: 1892.55

## 2020-01-10 NOTE — PROGRESS NOTES
[facility]  [address] [place] [id number] [phone]  Dept: [phone]    PRE-OP EVALUATION:  Today's date: 1/10/2020    Yael Johnson (: 1990) presents for pre-operative evaluation assessment as requested by Dr. Dr. Jacome.  She requires evaluation and anesthesia risk assessment prior to undergoing surgery/procedure for treatment of uterine polyp with irregular bleeding.  With her hysteroscopic resection of the polyp on 2020.     Fax number for surgical facility: Fax: [phone]   Primary Physician: Angela Pelayo  Type of Anesthesia Anticipated: General  NOPatient has a Health Care Directive or Living Will:  NO    Preop Questions 1/10/2020   Who is doing your surgery? Dr. Jacome   What are you having done? Vaginal Polyup   Date of Surgery/Procedure: 20 Vaginal polyp   Facility or Hospital where procedure/surgery will be performed: [facility]   1.  Do you have a history of Heart attack, stroke, stent, coronary bypass surgery, or other heart surgery? No   2.  Do you ever have any pain or discomfort in your chest? No   3.  Do you have a history of  Heart Failure? No   4.   Are you troubled by shortness of breath when:  walking on a level surface, or up a slight hill, or at night? No   5.  Do you currently have a cold, bronchitis or other respiratory infection? No   6.  Do you have a cough, shortness of breath, or wheezing? No   7.  Do you sometimes get pains in the calves of your legs when you walk? No   8. Do you or anyone in your family have previous history of blood clots? No   9.  Do you or does anyone in your family have a serious bleeding problem such as prolonged bleeding following surgeries or cuts? No   10. Have you ever had problems with anemia or been told to take iron pills? No   11. Have you had any abnormal blood loss such as black, tarry or bloody stools, or abnormal vaginal bleeding? YES -related to the uterine polyp.    12. Have you ever had a blood transfusion? No   13. Have you or any of your relatives ever had problems with anesthesia? No   14. Do you have sleep apnea, excessive snoring or daytime drowsiness? YES -has been referred to sleep but not made an appointment with sleep or bariatrics yet.   15. Do you have any prosthetic heart valves? No   16. Do you have prosthetic joints? No   17. Is there any chance that you may be pregnant? No         HPI:     HPI related to upcoming procedure:  Here for Preop for uterine polyp removal by hysteroscopy now scheduled for 1/24/2020.  Has had several no-shows and cancellations and rescheduling and finally here in today to be seen.    Depression and anxiety stable for now.  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  Hopes to be able to get in with a counselor and see sleep and bariatrics at another time in the future.    Had a script for ibuprofen last yr, used sometimes for headache, one pill and would cure headache entire day. Gets headaches now and then 3 to 4 times a month.  No associated aura flashes of light double vision or blurry vision.  Does wear contacts.  Reports is stressed and thinks may be triggered by tension.    Asthma well controlled.  Supposed to use her Advair Diskus twice a day but feels does not need to get by with using 3-4 times a week at most once a day.  Denies any wheezing or trouble breathing and has not had to use her rescue albuterol inhaler or nebulizer in a long time.     She has morbid obesity.  Does possibly snore.  Unclear if she has sleep apnea or not.  Hemoglobin A1c in March 2019 was 5.3.  CMP, CBC was normal in December.    History of palpitations now resolved.  Has seen cardiology in the past and work-up was unremarkable.    Bruise right lower shin is resolving.  Doppler ultrasound in December was negative for DVT.    Has not been using her birth control denies being  pregnant.  LMP 2020.    No fever or chills, no dizziness, no double or blurry vision, no facial pain, earache, sore throat, runny nose, post nasal drip, no trouble hearing, smelling, tasting or swallowing, no cough , no chest pain, trouble breathing or palpitations, No abdominal pain, heart burn, reflux, nausea or vomiting or diarrhea or constipation, no blood in stools or black stools, no weight loss or night sweats. No dysuria, hematuria, frequency, urgency, hesitancy, incontinence, No pelvic complaints. No leg swelling or joint pain. No rash.    BACKGROUND  Hx of morbid obesity, BMI > 44, enlarged tonsils, moderate persistent asthma on albuterol inhaler & neb prn & Advair 100/50 bid, allergic to cats & dogs, FH of diabetes, prior left breast biopsy that was benign & still had  a lump in , with hx of irregular menstrual cycle no longer on nuvaring, AUB on Micronor with mild improvement, u/S by gn outside [facility] showed a endometrial polyp, low ferritin, palpitations, hx of JAYE, MDD, hx of SI, hx of a lot of cancellations & no shows,   Previously under care of PCP Wiley, Seen first time by this writer on 18 for vaginal odor & treated for BV with Metrogel. Std screen was negative. Had left knee pain, exam was benign & advised quad strengthening , weight loss & referred to ENT for enlarged tonsils. Cbc, CMP, lipids, TSH, hep B,C, HIV , syphilis & GC were negative. Seen 19 by [facility] for sore throat , no infection seen. GC oral was negative. Was given nuvaring,  Advair dose increased but not started, remains stable on prior lower dosing.    Seen 3/8/19 for palpitations, aware of it since the middle of 2018. Symptoms started the day her brother in law  of a heart attack  & thought maybe was anxiety initially then worried about dying from a heart attack due to obesity. When first started palpitations it would last a couple days and then over time few hours, but when seen reported it  didn't t occur as frequently. Would usually note when she was lying down at night. Did not feel while at work or while walking to the bus unless had to run. Usually when occurred at night would say a prayer and go to bed and get up next am feeling fine. With the palpitations she had no associated symptoms. . She also noted her Mom was concerned about her having bipolar disorder. She & her friends did not think she had it but her Mom was concerned about bipolar and adult ADHD as mom had it. Noted periods of increased sexuality but no periods of getting by with no to less sleep & denied any visual or auditory hallucinations or psychosis.  Asthma was stable on Advair 100/50 & and albuterol prn. Noted did use her dads Symbicort inhaler when didn't have insurance & only used Albuterol neb if running out of meds. Exam was benign. Had no red flag symptom or signs. EKG showed NSR. Suspected anxiety and weight playing a role. Asthma was well controlled & continued on lower dosing of Advair.To work on lifestyle. Referred to psychology for diagnostic testing, counseling and then psyche if needed meds regarding concern about family hx of bipolar & ADHD. Referred to cardiology, & Holter ordered & referred to bariatrics. To consider seeing sleep in future given obesity & possible hx of snoring. Was to see gyn for irregular menstrual cycle and to do pap with them.   CMP, TSH, iron, HCG, HB A1c, CBC, was normal. Ferritin was lower end of normal and Holter was normal.  Seen by gyn 3/2019 for irregular menses, recurrent BV, Cx was normal, pap not done & progesterone was normal. No showed to follow up & cancelled apt 4/4/, 4/11, 4/17 & 4/18. Seen by cardiology 4/17 & reassured heart was normal & to work on loosing weight. Seen in the ER 8/10 after drinking alcohol with Suicidal ideation but evaluated and discharged home. Seen then by [facility] 8/30 for AUB & given progesterone only pill & pelvic u/S ordered , thought had [facility] and Zeeshan signed to get report. Pelvic u/S 9/19 showed a 1.2 cm upper uterine segment polyp or submucosal fibroid & a 3.3cm simple right ovarian cyst. Seen by Gyn and PAP and GC obtained was normal. Seen by gyn 10/3/19 & planned for hysteroscopy polypectomy for AUB & u/S findings. Seen by [facility] on 10/25/19 for vaginal odor but wet prep was normal. Cancelled and rescheduled from 11/29/19.    Seen 12/6/19 for atypical chest pain, right leg fatigue and shin bruise and left kneecap popping out.  Examination was benign. Chest pain did not sound cardiac. Extensive work up this past year with EKG, Holter and cardiology ruled out cardiac cause. Given weight and use of  birth control though  did check basic tests. Suspected had costochondritis related to weight and sitting slouched and stress. Advised that physical therapy and weight loss could help. Right leg fatigue did not sound like a DVT and left knee symptoms were suggestive of patella femoral syndrome/ subluxation. Asthma was reported well controlled but wheezing was heard and reported had not taken her meds that am. Gave herself an inhaler while in the room. Asthma action plan given. Sylvie/ MDD noted  stable, & declined counseling or meds at the time.  Was advised to avoid alcohol.   To consider seeing bariatrics and sleep to evaluate and treat for  possible sleep apnea and help loose weight in the future. These referrals were given earlier.  CBC, CMP, ferritin, d-dimer and EKG were normal.  Doppler was negative and just showed the old bruise right anterior shin.  Did see sports medicine on 12/19 and diagnosed with subluxation of the left patella and referred to physical therapy which she says she has started doing.  No showed or canceled several times and finally rescheduled for today. MN  negative.    PHQ-9 ([employer]) 1/10/2020   1.  Little interest or pleasure in doing things 0   2.  Feeling down, depressed, or hopeless 0   3.  Trouble  falling or staying asleep, or sleeping too much 2   4.  Feeling tired or having little energy 3   5.  Poor appetite or overeating 3   6.  Feeling bad about yourself 0   7.  Trouble concentrating 0   8.  Moving slowly or restless 0   9.  Suicidal or self-harm thoughts 0   PHQ-9 Total Score 8   Difficulty at work, home, or with people    1.  Little interest or pleasure in doing things Not at all   2.  Feeling down, depressed, or hopeless Not at all   3.  Trouble falling or staying asleep, or sleeping too much More than half the days   4.  Feeling tired or having little energy Nearly every day   5.  Poor appetite or overeating Nearly every day   6.  Feeling bad about yourself Not at all   7.  Trouble concentrating Not at all   8.  Moving slowly or restless Not at all   9.  Suicidal or self-harm thoughts Not at all   PHQ-9 via [employer] TOTAL SCORE-----> 8 (Mild depression)   Difficulty at work, home, or with people Somewhat difficult   JAYE-7   [employer], 2002; Used with Permission) 1/10/2020   1. Feeling nervous, anxious, or on edge Several days   2. Not being able to stop or control worrying Not at all   3. Worrying too much about different things Not at all   4. Trouble relaxing Not at all   5. Being so restless that it is hard to sit still Not at all   6. Becoming easily annoyed or irritable Not at all   7. Feeling afraid, as if something awful might happen Several days   JAYE 7 TOTAL SCORE 2 (minimal anxiety)   1. Feeling nervous, anxious, or on edge 1   2. Not being able to stop or control worrying 0   3. Worrying too much about different things 0   4. Trouble relaxing 0   5. Being so restless that it is hard to sit still 0   6. Becoming easily annoyed or irritable 0   7. Feeling afraid, as if something awful might happen 1   JAYE-7 Total Score 2   If you checked any problems, how difficult have they made it for you to do your work, take care of things at home, or get along with other people?        MEDICAL HISTORY:      Patient Active Problem List    Diagnosis Date Noted     Knee pain, left 01/02/2020     Priority: Medium     Subluxation of left patella, sequela 12/20/2019     Priority: Medium     JAYE (generalized anxiety disorder) 11/28/2019     Priority: Medium     Major depressive disorder, remission status unspecified, unspecified whether recurrent 11/28/2019     Priority: Medium     Abnormal uterine bleeding (AUB) 11/28/2019     Priority: Medium     Uterine polyp 11/28/2019     Priority: Medium     Irregular menstrual cycle 02/18/2019     Priority: Medium     Morbid obesity (H) 05/31/2018     Priority: Medium     Moderate persistent asthma 11/23/2015     Priority: Medium     Family history of diabetes mellitus      Priority: Medium     mother's side       Enlarged tonsils      Priority: Medium      Past Medical History:   Diagnosis Date     Asthma      Enlarged tonsils      Low ferritin 4/11/2019     Palpitations 4/11/2019     Past Surgical History:   Procedure Laterality Date     US BREAST BIOPSY LT  2017    benign, still has a lump     Current Outpatient Medications   Medication Sig Dispense Refill     ADVAIR DISKUS 100-50 MCG/DOSE inhaler INHALE 1 PUFF INTO THE LUNGS EVERY 12 HOURS 1 Inhaler 0     albuterol (2.5 MG/3ML) 0.083% nebulizer solution Take 1 vial (2.5 mg) by nebulization every 6 hours as needed for shortness of breath / dyspnea 1 Box 11     albuterol (PROAIR HFA/PROVENTIL HFA/VENTOLIN HFA) 108 (90 Base) MCG/ACT Inhaler Inhale 2 puffs into the lungs every 6 hours as needed for shortness of breath / dyspnea or wheezing 1 Inhaler 1     naproxen (NAPROSYN) 500 MG tablet Weekly as needed for headaches (Patient not taking: Reported on 1/10/2020) 20 tablet 0     norethindrone (MICRONOR) 0.35 MG tablet Take 0.35 mg by mouth daily  4     OTC products: None, except as noted above    Allergies   Allergen Reactions     Cats      Dogs       Latex Allergy: NO    Social History     Tobacco Use     Smoking status: Never  "Smoker     Smokeless tobacco: Never Used   Substance Use Topics     Alcohol use: Not Currently     Frequency: Never     Comment: OCC     History   Drug Use No       REVIEW OF SYSTEMS:   CONSTITUTIONAL: NEGATIVE for fever, chills, change in weight  INTEGUMENTARY/SKIN: NEGATIVE for worrisome rashes, moles or lesions  EYES: NEGATIVE for vision changes or irritation, wears contacts  ENT/MOUTH: NEGATIVE for ear, mouth and throat problems  RESP: NEGATIVE for significant cough or SOB  BREAST: NEGATIVE for new masses, tenderness or discharge, notes a prior history of her breast lump that was biopsied and noted to be benign several years ago on the left and unchanged for a long time.  CV: NEGATIVE for chest pain, palpitations or peripheral edema  GI: NEGATIVE for nausea, abdominal pain, heartburn, or change in bowel habits  : NEGATIVE for frequency, dysuria, or hematuria  MUSCULOSKELETAL: NEGATIVE for significant arthralgias or myalgia  NEURO: NEGATIVE for weakness, dizziness or paresthesias  ENDOCRINE: NEGATIVE for temperature intolerance, skin/hair changes  HEME: NEGATIVE for bleeding problems  PSYCHIATRIC: NEGATIVE for changes in mood or affect    EXAM:   /72 (BP Location: Right arm, Patient Position: Chair, Cuff Size: Adult Large)   Pulse 87   Temp 97.3  F (36.3  C) (Tympanic)   Resp 15   Ht 1.638 m (5' 4.5\")   Wt 116.5 kg (256 lb 12 oz)   LMP 01/02/2020 (Exact Date)   SpO2 93%   BMI 43.39 kg/m      GENERAL APPEARANCE: healthy, alert, no distress, cooperative and obese     EYES: EOMI, PERRL     HENT: ear canals and TM's normal and nose and mouth without ulcers or lesions     NECK: no adenopathy, no asymmetry, masses, or scars and thyroid normal to palpation     RESP: lungs clear to auscultation - no rales, rhonchi or wheezes     CV: regular rates and rhythm, normal S1 S2, no S3 or S4 and no murmur, click or rub     ABDOMEN:  soft, non tender, no HSM or masses and bowel sounds normal     MS: extremities " normal- no gross deformities noted, no evidence of inflammation in joints, FROM in all extremities.     SKIN: no suspicious lesions or rashes, resolving bruise right lower shin.     NEURO: Normal strength and tone, sensory exam grossly normal, mentation intact and speech normal     PSYCH: mentation appears normal. and affect normal/bright     LYMPHATICS: No cervical adenopathy    DIAGNOSTICS:     EKG on 12/6/2019: appeared normal, NSR, normal axis, normal intervals, no acute ST/T changes C/w ischemia, no LVH by voltage criteria, unchanged from previous tracings  Labs Resulted Today: No results found for any visits on 01/10/20.  Labs Drawn and in Process:   Unresulted Labs Ordered in the Past 30 Days of this Admission     No orders found from 12/11/2019 to 1/11/2020.        Recent Labs   Lab Test 12/06/19  1419 03/08/19  1232 05/31/18  1458   HGB 12.6 13.0 13.1    180 199    139 139   POTASSIUM 3.7 4.0 3.5   CR 0.90 0.76 0.73   A1C  --  5.3 5.5      IMPRESSION:   Reason for surgery/procedure: Preop  Diagnosis/reason for consult: uterine polyp    The proposed surgical procedure is considered INTERMEDIATE risk.    REVISED CARDIAC RISK INDEX  The patient has the following serious cardiovascular risks for perioperative complications such as (MI, PE, VFib and 3  AV Block):  No serious cardiac risks  INTERPRETATION: 0 risks: Class I (very low risk - 0.4% complication rate)    The patient has the following additional risks for perioperative complications:  No identified additional risks      ICD-10-CM    1. Pre-operative general physical examination Z01.818    2. Uterine polyp N84.0    3. Abnormal uterine bleeding (AUB) N93.9    4. Irregular menstrual cycle N92.6    5. Morbid obesity (H) E66.01    6. Moderate persistent asthma without complication J45.40    7. Major depressive disorder, remission status unspecified, unspecified whether recurrent F32.9 DEPRESSION ACTION PLAN (DAP)   8. JAYE (generalized anxiety  disorder) F41.1 DEPRESSION ACTION PLAN (DAP)   9. Episodic tension-type headache, not intractable G44.219 naproxen (NAPROSYN) 500 MG tablet     RECOMMENDATIONS:     --Consult hospital rounder / IM to assist post-op medical management  Labs and diagnostics not necessary today CBC and CMP and EKG on 12/6/2019 were within normal range.  Previous evaluation by cardiology also unremarkable.  Take no meds am of surgery except  asthma medications.  Hold aspirin, antiinflammatories like motrin, aleve etc, fish oil and glucosamine preferable 5 to 7 days prior to surgery  Will fax /send note to surgeon once completed today.  Depression action plan given.  Consider counseling.  Monitor bruise right lower leg.  Follow-up sports doctor regarding your knee and thigh. Do Pt for subluxation of patella on left  Recommend seeing the sleep specialist and bariatrics to help lose weight and evaluate and treat for possible sleep apnea.  See back in 3 months for a routine preventive physical or earlier as needed.    Cardiovascular Risk  Performs 4 METs exercise without symptoms (Light housework (dusting, washing dishes), Climb a flight of stairs and Walk on level ground at 15 minutes per mile (4 miles/hour)) .     Pulmonary Risk  Incentive spirometry post op  Respiratory Therapy (Respiratory Care IP Consult)  post op  NG tube decompression if abdominal distension or significant vomiting     Suspected sleep apnea  Patient is to bring their home CPAP with them on the day of surgery  Hospital staff are advised to monitor for sleep related oxygen desaturations due to suspicion of CAILIN  --Patient is to take all scheduled medications on the day of surgery EXCEPT for modifications listed above.    APPROVAL GIVEN to proceed with proposed procedure, without further diagnostic evaluation       Signed Electronically by: Angela Pelayo MD    Copy of this evaluation report is provided to requesting physician.    [facility] Preop Guidelines    Revised  Cardiac Risk Index

## 2020-01-10 NOTE — PATIENT INSTRUCTIONS
Labs and diagnostics not necessary today CBC and CMP and EKG on 12/6/2019 were within normal range.  Previous evaluation by cardiology also unremarkable.  Take no meds am of surgery except  asthma medications.  Hold aspirin, antiinflammatories like motrin aleve etc, fish oil and glucosamine preferable 5 to 7 days prior to surgery  Will fax /send note to surgeon once completed today.  Depression action plan given.  Consider counseling.  Monitor bruise right lower leg.  Follow-up sports doctor regarding your knee and thigh. Do Pt for subluxation of patella on left  Recommend seeing the sleep specialist and bariatrics to help lose weight and evaluate and treat for possible sleep apnea.  See you back in 3 months for a routine preventive physical or earlier as needed.      Before Your Surgery      Call your surgeon if there is any change in your health. This includes signs of a cold or flu (such as a sore throat, runny nose, cough, rash or fever).    Do not smoke, drink alcohol or take over the counter medicine (unless your surgeon or primary care doctor tells you to) for the 24 hours before and after surgery.    If you take prescribed drugs: Follow your doctor s orders about which medicines to take and which to stop until after surgery.    Eating and drinking prior to surgery: follow the instructions from your surgeon    Take a shower or bath the night before surgery. Use the soap your surgeon gave you to gently clean your skin. If you do not have soap from your surgeon, use your regular soap. Do not shave or scrub the surgery site.  Wear clean pajamas and have clean sheets on your bed.

## 2020-01-10 NOTE — LETTER
My Depression Action Plan  Name: Yael Johnson   Date of Birth 1990  Date: 1/10/2020    My doctor: Angela Pelayo   My clinic: [address] [address] [phone]          GREEN    ZONE   Good Control    What it looks like:     Things are going generally well. You have normal up s and down s. You may even feel depressed from time to time, but bad moods usually last less than a day.   What you need to do:  1. Continue to care for yourself (see self care plan)  2. Check your depression survival kit and update it as needed  3. Follow your physician s recommendations including any medication.  4. Do not stop taking medication unless you consult with your physician first.           YELLOW         ZONE Getting Worse    What it looks like:     Depression is starting to interfere with your life.     It may be hard to get out of bed; you may be starting to isolate yourself from others.    Symptoms of depression are starting to last most all day and this has happened for several days.     You may have suicidal thoughts but they are not constant.   What you need to do:     1. Call your care team, your response to treatment will improve if you keep your care team informed of your progress. Yellow periods are signs an adjustment may need to be made.     2. Continue your self-care, even if you have to fake it!    3. Talk to someone in your support network    4. Open up your depression survival kit           RED    ZONE Medical Alert - Get Help    What it looks like:     Depression is seriously interfering with your life.     You may experience these or other symptoms: You can t get out of bed most days, can t work or engage in other necessary activities, you have trouble taking care of basic hygiene, or basic responsibilities, thoughts of suicide or death that will not go away, self-injurious behavior.     What you need to do:  1. Call your care team and  request a same-day appointment. If they are not available (weekends or after hours) call your local crisis line, emergency room or 911.            Depression Self Care Plan / Survival Kit    Self-Care for Depression  Here s the deal. Your body and mind are really not as separate as most people think.  What you do and think affects how you feel and how you feel influences what you do and think. This means if you do things that people who feel good do, it will help you feel better.  Sometimes this is all it takes.  There is also a place for medication and therapy depending on how severe your depression is, so be sure to consult with your medical provider and/ or Behavioral Health Consultant if your symptoms are worsening or not improving.     In order to better manage my stress, I will:    Exercise  Get some form of exercise, every day. This will help reduce pain and release endorphins, the  feel good  chemicals in your brain. This is almost as good as taking antidepressants!  This is not the same as joining a gym and then never going! (they count on that by the way ) It can be as simple as just going for a walk or doing some gardening, anything that will get you moving.      Hygiene   Maintain good hygiene (Get out of bed in the morning, Make your bed, Brush your teeth, Take a shower, and Get dressed like you were going to work, even if you are unemployed).  If your clothes don't fit try to get ones that do.    Diet  I will strive to eat foods that are good for me, drink plenty of water, and avoid excessive sugar, caffeine, alcohol, and other mood-altering substances.  Some foods that are helpful in depression are: complex carbohydrates, B vitamins, flaxseed, fish or fish oil, fresh fruits and vegetables.    Psychotherapy  I agree to participate in Individual Therapy (if recommended).    Medication  If prescribed medications, I agree to take them.  Missing doses can result in serious side effects.  I understand that  drinking alcohol, or other illicit drug use, may cause potential side effects.  I will not stop my medication abruptly without first discussing it with my provider.    Staying Connected With Others  I will stay in touch with my friends, family members, and my primary care provider/team.    Use your imagination  Be creative.  We all have a creative side; it doesn t matter if it s oil painting, sand castles, or mud pies! This will also kick up the endorphins.    Witness Beauty  (AKA stop and smell the roses) Take a look outside, even in mid-winter. Notice colors, textures. Watch the squirrels and birds.     Service to others  Be of service to others.  There is always someone else in need.  By helping others we can  get out of ourselves  and remember the really important things.  This also provides opportunities for practicing all the other parts of the program.    Humor  Laugh and be silly!  Adjust your TV habits for less news and crime-drama and more comedy.    Control your stress  Try breathing deep, massage therapy, biofeedback, and meditation. Find time to relax each day.     My support system    Clinic Contact:  Phone number:    Contact 1:  Phone number:    Contact 2:  Phone number:    Uatsdin/:  Phone number:    Therapist:  Phone number:    Local crisis center:    Phone number:    Other community support:  Phone number:

## 2020-01-11 ASSESSMENT — PATIENT HEALTH QUESTIONNAIRE - PHQ9: SUM OF ALL RESPONSES TO PHQ QUESTIONS 1-9: 8

## 2020-01-11 ASSESSMENT — ANXIETY QUESTIONNAIRES: GAD7 TOTAL SCORE: 2

## 2020-02-05 ENCOUNTER — NURSE TRIAGE (OUTPATIENT)
Dept: NURSING | Facility: CLINIC | Age: 30
End: 2020-02-05

## 2020-02-06 NOTE — TELEPHONE ENCOUNTER
Right eyebrow spasms few seconds at a time, off and on all during the day.  She got new contacts one week ago.  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   She is not sure if this is related.  Guidelines recommend seeing provider within three days.  She will call her clinic tomorrow to make an appointment and/or eye doctor.    Kat Mukherjee RN  [facility]        Reason for Disposition    MODERATE eyelid twitch or spasm (interferes with school or work)    Additional Information    Negative: Difficult to awaken or acting confused (e.g., disoriented, slurred speech)    Negative: Sounds like a life-threatening emergency to the triager    Abnormal twitch, blinking, tic, or spasm of eyelid(s)    Negative: Loss of speech or garbled speech    Negative: Difficult to awaken or acting confused (e.g., disoriented, slurred speech)    Negative: Sounds like a life-threatening emergency to the triager    Negative: [1] SEVERE Eyelid twitch or spasm (constant or non-stop) AND lasts > 15 minutes    Negative: [1] Muscle twitch (spasm) also happens in face (e.g., cheek, jaw area) AND [2] lasts more than a few seconds    Negative: Patient sounds very sick or weak to the triager    Negative: [1] Muscle twitch (spasm) also happens in face (e.g., cheek, jaw area) AND [2] brief (a second or two; not present now)    Protocols used: EYELID TWITCH OR SPASM-A-AH, MUSCLE JERKS - TICS - [id number]

## 2020-03-23 PROBLEM — M25.562 KNEE PAIN, LEFT: Status: RESOLVED | Noted: 2020-01-02 | Resolved: 2020-03-23

## 2020-04-19 ENCOUNTER — NURSE TRIAGE (OUTPATIENT)
Dept: NURSING | Facility: CLINIC | Age: 30
End: 2020-04-19

## 2020-04-19 ENCOUNTER — VIRTUAL VISIT (OUTPATIENT)
Dept: FAMILY MEDICINE | Facility: OTHER | Age: 30
End: 2020-04-19

## 2020-04-19 NOTE — PROGRESS NOTES
"Date: 2020 08:42:38  Clinician: Rekha Farmer  Clinician NPI: [id number]  Patient: Yael Johnson  Patient : 1990  Patient Address: [address] [place] [address] [address], [place] [address]  Patient Phone: [phone]  Visit Protocol: URI  Patient Summary:  Yael is a 29 year old ( : 1990 ) female who initiated a Visit for COVID-19 (Coronavirus) evaluation and screening. When asked the question \"Please sign me up to receive news, health information and promotions from [facility].\", Yael responded \"No\".    Yael states her symptoms started suddenly 3-4 days ago.   Her symptoms consist of wheezing.   Symptom details   Wheezing: Yael has been diagnosed with asthma. The wheezing does not interfere with her normal daily activities.   Yael denies having rhinitis, chills, diarrhea, facial pain or pressure, sore throat, cough, nasal congestion, malaise, ear pain, fever, myalgias, vomiting, nausea, teeth pain, headache, anosmia, and ageusia. She also denies taking antibiotic medication for the symptoms, double sickening (worsening symptoms after initial improvement), and having recent facial or sinus surgery in the past 60 days.    Pertinent COVID-19 (Coronavirus) information  Yael has not traveled internationally or to the areas where COVID-19 (Coronavirus) is widespread, including cruise ship travel in the last 14 days before the start of her symptoms.   Yael does not work or volunteer as healthcare worker or a  and does not work or volunteer in a healthcare facility.   She does not live with a healthcare worker.   Yael has not had a close contact with a laboratory-confirmed COVID-19 patient within 14 days of symptom onset. She also has not had a close contact with a suspected COVID-19 patient within 14 days of symptom onset.   Triage Point(s) temporarily suspended for COVID-19 (Coronavirus) screening  Yael reported the following symptoms which were previously " protocol referral points. These protocol referral points have temporarily been removed for purposes of COVID-19 (Coronavirus) screening.   Difficulty breathing even when resting and can only speak in phrase(s)   Pertinent medical history  Yael typically gets a yeast infection when she takes antibiotics. She has used fluconazole (Diflucan) to treat previous yeast infections. 1 dose of fluconazole (Diflucan) has typically been sufficient for symptoms to resolve in the past.   Yael does not need a return to work/school note.   Weight: 259 lbs   Yael does not smoke or use smokeless tobacco.   She denies pregnancy and denies breastfeeding. She has menstruated in the past month.   Weight: 259 lbs  A synchronous phone visit was initiated by the provider for the following reason: need more history from patient    MEDICATIONS: albuterol sulfate inhalation, norethindrone-ethinyl estradiol oral, Advair Diskus inhalation, ALLERGIES: NKDA  Clinician Response:  Dear Yael,   I feel symptoms are from allergy /viral infection with asthma symptoms&nbsp;  if your breathing does not get better with albuterol and your steroid inhalor then notify us&nbsp;  continue on symptomatic management&nbsp;       COVID-19 (Coronavirus) General Information  Because there is currently no vaccine to prevent infection, the best way to protect yourself is to avoid being exposed to this virus. Common symptoms of COVID-19 include but are not limited to fever, cough, and shortness of breath. These symptoms appear 2-14 days after you are exposed to the virus that causes COVID-19. Click here for more information from the CDC on how to protect yourself.  If you are sick with COVID-19 or suspect you are infected with the virus that causes COVID-19, follow the steps here from the CDC to help prevent the disease from spreading to people in your home and community.  Click here for general information from the CDC on testing.  If you develop any of  these emergency warning signs for COVID-19, get medical attention immediately:     Trouble breathing    Persistent pain or pressure in the chest    New confusion or inability to arouse    Bluish lips or face      Call your doctor or clinic before going in. Call 911 if you have a medical emergency and notify the  you have or think you may have COVID-19.  For more detailed and up to date information on COVID-19 (Coronavirus), please visit the CDC website.   Diagnosis: Wheezing  Diagnosis ICD: R06.2  Triage Notes: I reviewed the patient's history, verified their identity, and explained the Visit process.    pt has been noticing some sob and wheezing for last 4 days, she did take the inhalor   she has h/o asthma and does take inhalors for it   has some nasal sniffles which could be from allergy too  Synchronous Triage: phone, status: completed, duration: 379 seconds

## 2020-04-19 NOTE — TELEPHONE ENCOUNTER
"Pt reports intermittent SOB x 4 days, seems to be getting worse as she is \"starting to notice it more\".  Itchy throat also present.  She denies CP, fever, cough.      Hx: Asthma    Disposition:  [facility] now - Per COVID-19 workflow.  She verbalized understanding and had no further questions.     Disposition/Instructions    Patient to have an [facility] Visit with a provider (Preferred option). Follow System Ambulatory Workflow for COVID 19.     To do this follow these instructions:    1. Go to the website [email or web address]  2. Create an account (you will need your insurance information)  3. Start a new visit  4. Choose your diagnosis (e.g. COVID19)  5. Fill out the information about your symptoms  6. A provider will reach out to you by text, phone call or video visit based on your request    Call Back If: Your symptoms worsen before you are able to complete your [facility] Visit with a provider.    Nurys Galaviz RN/[facility]    Reason for Disposition    MILD difficulty breathing (e.g., minimal/no SOB at rest, SOB with walking, pulse <100)    Additional Information    Negative: SEVERE difficulty breathing (e.g., struggling for each breath, speaks in single words)    Negative: Difficult to awaken or acting confused (e.g., disoriented, slurred speech)    Negative: Bluish (or gray) lips or face now    Negative: Shock suspected (e.g., cold/pale/clammy skin, too weak to stand, low BP, rapid pulse)    Negative: Sounds like a life-threatening emergency to the triager    Negative: SEVERE or constant chest pain (Exception: mild central chest pain, present only when coughing)    Negative: MODERATE difficulty breathing (e.g., speaks in phrases, SOB even at rest, pulse 100-120)    Negative: Patient sounds very sick or weak to the triager    Protocols used: CORONAVIRUS (COVID-19) DIAGNOSED OR [id number] 3.30.20      "

## 2020-04-20 ENCOUNTER — NURSE TRIAGE (OUTPATIENT)
Dept: NURSING | Facility: CLINIC | Age: 30
End: 2020-04-20

## 2020-04-21 NOTE — TELEPHONE ENCOUNTER
"\"I'm wheezing and I'm still short of breath\".  Caller had a recent virtual visit, they suspected her asthma/allergy/viral infection causing sxs.    She is not getting relief from her inhaler.    She also does not know if she is running a fever, but states she feels warm.    COVID 19 Nurse Triage Plan/Patient Instructions    Please be aware that novel coronavirus (COVID-19) may be circulating in the community. If you develop symptoms such as fever, cough, or SOB or if you have concerns about the presence of another infection including coronavirus (COVID-19), please contact your health care provider or visit [email or web address].     Disposition/Instructions    Patient to go to ED and follow protocol based instructions. Follow System Ambulatory Workflow for COVID 19.     Bring Your Own Device:  Please also bring your smart device(s) (smart phones, tablets, laptops) and their charging cables for your personal use and to communicate with your care team during your visit.    Thank you for limiting contact with others, wearing a simple mask to cover your cough, practice good hand hygiene habits and accessing our virtual services where possible to limit the spread of this virus.    For more information about COVID19 and options for caring for yourself at home, please visit the CDC website at https://www.cdc.gov/coronavirus/2019-ncov/about/steps-when-sick.html  For more options for care at [facility], please visit our website at [email or web address]    For more information, please use the [facility] ([facility]) COVID-19 Hotlines (Interpreters available):     Health questions: Phone Number: [phone] or [phone] and Hours: 7 a.m. to 7 p.m.    Schools and  questions: Phone Number: [phone] or [phone] and Hours 7 a.m. to 7 p.m.  Reason for Disposition    [1] MILD difficulty breathing (e.g., minimal/no SOB at rest, SOB with walking, pulse <100) AND [2] " "NEW-onset or WORSE than normal    Additional Information    Negative: [1] Breathing stopped AND [2] hasn't returned    Negative: Severe difficulty breathing (e.g., struggling for each breath, speaks in single words)    Negative: Choking on something    Negative: Bluish (or gray) lips or face now    Negative: Difficult to awaken or acting confused (e.g., disoriented, slurred speech)    Negative: Passed out (i.e., lost consciousness, collapsed and was not responding)    Negative: Wheezing started suddenly after medicine, an allergic food or bee sting    Negative: Stridor    Negative: Slow, shallow and weak breathing    Negative: Sounds like a life-threatening emergency to the triager    Negative: Chest pain    Negative: [1] Wheezing (high pitched whistling sound) AND [2] previous asthma attacks or use of asthma medicines    Negative: [1] Difficulty breathing AND [2] only present when coughing    Negative: [1] Difficulty breathing AND [2] only from stuffy or runny nose    Negative: [1] MODERATE difficulty breathing (e.g., speaks in phrases, SOB even at rest, pulse 100-120) AND [2] NEW-onset or WORSE than normal    Negative: Wheezing can be heard across the room    Negative: Drooling or spitting out saliva (because can't swallow)    Negative: History of prior \"blood clot\" in leg or lungs (i.e., deep vein thrombosis, pulmonary embolism)    Negative: History of inherited increased risk of blood clots (e.g., Factor 5 Leiden, Anti-thrombin 3, Protein C or Protein S deficiency, Prothrombin mutation)    Negative: Recent illness requiring prolonged bedrest (i.e., immobilization)    Negative: Hip or leg fracture in past 2 months (e.g., had cast on leg or ankle)    Negative: Major surgery in the past month    Negative: Recent long-distance travel with prolonged time in car, bus, plane, or train (i.e., within past 2 weeks; 6 or  more hours duration)    Negative: Extra heart beats OR irregular heart beating   (i.e., " "\"palpitations\")    Negative: Fever > 103 F (39.4 C)    Negative: [1] Fever > 101 F (38.3 C) AND [2] age > 60    Negative: [1] Fever > 100.0 F (37.8 C) AND [2] bedridden (e.g., nursing home patient, CVA, chronic illness, recovering from surgery)    Negative: [1] Fever > 100.0 F (37.8 C) AND [2] diabetes mellitus or weak immune system (e.g., HIV positive, cancer chemo, splenectomy, chronic steroids)    Negative: [1] Periods where breathing stops and then resumes normally AND [2] bedridden (e.g., nursing home patient, CVA)    Negative: Pregnant or postpartum (< 1 month since delivery)    Negative: Patient sounds very sick or weak to the triager    Protocols used: BREATHING DIFFICULTY-SHARRON-    Winifred Lawrence RN  [facility]      "

## 2020-04-23 ENCOUNTER — TELEPHONE (OUTPATIENT)
Dept: ORTHOPEDICS | Facility: CLINIC | Age: 30
End: 2020-04-23

## 2020-04-23 NOTE — TELEPHONE ENCOUNTER
Spoke with patient about her upcoming appointment with . I stated that  is not available on 5/7 due to another clinic needing him. I did offer her a virtual video visit on 4/28 and 5/5 and she stated that she will call us back to get that appointment rescheduled.

## 2020-05-19 DIAGNOSIS — J45.40 MODERATE PERSISTENT ASTHMA WITHOUT COMPLICATION: ICD-10-CM

## 2020-05-21 NOTE — TELEPHONE ENCOUNTER
ACT Total Scores 3/8/2019 9/20/2019 12/6/2019   ACT TOTAL SCORE - - -   ASTHMA ER VISITS - - -   ASTHMA HOSPITALIZATIONS - - -   ACT TOTAL SCORE (Goal Greater than or Equal to 20) 23 21 24   In the past 12 months, how many times did you visit the emergency room for your asthma without being admitted to the hospital? 0 0 0   In the past 12 months, how many times were you hospitalized overnight because of your asthma? 0 0 0     Refilled advair per [facility] refill policy.    Pretty Issa RN

## 2020-06-01 ENCOUNTER — MYC MEDICAL ADVICE (OUTPATIENT)
Dept: FAMILY MEDICINE | Facility: CLINIC | Age: 30
End: 2020-06-01

## 2020-06-08 ENCOUNTER — TELEPHONE (OUTPATIENT)
Dept: FAMILY MEDICINE | Facility: CLINIC | Age: 30
End: 2020-06-08
Payer: COMMERCIAL

## 2020-06-08 PROBLEM — N93.9 ABNORMAL UTERINE BLEEDING (AUB): Status: RESOLVED | Noted: 2019-11-28 | Resolved: 2020-06-08

## 2020-06-08 PROBLEM — N84.0 UTERINE POLYP: Status: RESOLVED | Noted: 2019-11-28 | Resolved: 2020-06-08

## 2020-06-08 NOTE — TELEPHONE ENCOUNTER
Left this detailed message on cell phone.  Left this provider response for requested med.    Call clinic triage with questions.  HUGO Brody

## 2020-06-08 NOTE — TELEPHONE ENCOUNTER
Angela Pelayo MD  P  Triage [facility]   My 800 am tomorrow: I don't prescribe phentermine but I can refer her to bariatrics at [facility] tomorrow to discuss other options as phentermine may be contraindicatedin her case      Triage needs to review this with pt.  HUGO Brody

## 2020-06-09 ENCOUNTER — VIRTUAL VISIT (OUTPATIENT)
Dept: FAMILY MEDICINE | Facility: CLINIC | Age: 30
End: 2020-06-09
Payer: COMMERCIAL

## 2020-06-09 VITALS — BODY MASS INDEX: 44.15 KG/M2 | WEIGHT: 265 LBS | HEIGHT: 65 IN

## 2020-06-09 DIAGNOSIS — E66.01 MORBID OBESITY (H): Primary | ICD-10-CM

## 2020-06-09 DIAGNOSIS — N92.6 IRREGULAR MENSTRUAL CYCLE: ICD-10-CM

## 2020-06-09 DIAGNOSIS — J45.40 MODERATE PERSISTENT ASTHMA WITHOUT COMPLICATION: ICD-10-CM

## 2020-06-09 DIAGNOSIS — S83.002S SUBLUXATION OF LEFT PATELLA, SEQUELA: ICD-10-CM

## 2020-06-09 DIAGNOSIS — Z83.3 FAMILY HISTORY OF DIABETES MELLITUS: ICD-10-CM

## 2020-06-09 DIAGNOSIS — F41.1 GAD (GENERALIZED ANXIETY DISORDER): ICD-10-CM

## 2020-06-09 DIAGNOSIS — F32.9 MAJOR DEPRESSIVE DISORDER, REMISSION STATUS UNSPECIFIED, UNSPECIFIED WHETHER RECURRENT: ICD-10-CM

## 2020-06-09 PROCEDURE — 99214 OFFICE O/P EST MOD 30 MIN: CPT | Mod: TEL | Performed by: FAMILY MEDICINE

## 2020-06-09 ASSESSMENT — ANXIETY QUESTIONNAIRES
5. BEING SO RESTLESS THAT IT IS HARD TO SIT STILL: NOT AT ALL
6. BECOMING EASILY ANNOYED OR IRRITABLE: SEVERAL DAYS
IF YOU CHECKED OFF ANY PROBLEMS ON THIS QUESTIONNAIRE, HOW DIFFICULT HAVE THESE PROBLEMS MADE IT FOR YOU TO DO YOUR WORK, TAKE CARE OF THINGS AT HOME, OR GET ALONG WITH OTHER PEOPLE: NOT DIFFICULT AT ALL
1. FEELING NERVOUS, ANXIOUS, OR ON EDGE: MORE THAN HALF THE DAYS
GAD7 TOTAL SCORE: 6
7. FEELING AFRAID AS IF SOMETHING AWFUL MIGHT HAPPEN: SEVERAL DAYS
2. NOT BEING ABLE TO STOP OR CONTROL WORRYING: SEVERAL DAYS
3. WORRYING TOO MUCH ABOUT DIFFERENT THINGS: SEVERAL DAYS

## 2020-06-09 ASSESSMENT — MIFFLIN-ST. JEOR: SCORE: 1937.91

## 2020-06-09 ASSESSMENT — PATIENT HEALTH QUESTIONNAIRE - PHQ9
5. POOR APPETITE OR OVEREATING: NOT AT ALL
SUM OF ALL RESPONSES TO PHQ QUESTIONS 1-9: 4

## 2020-06-09 NOTE — PROGRESS NOTES
"Yael Johnson is a 29 year old female who is being evaluated via a billable telephone visit.      The patient has been notified of following:      \"This telephone visit will be conducted via a call between you and your physician/provider. We have found that certain health care needs can be provided without the need for a physical exam.  This service lets us provide the care you need with a short phone conversation.  If a prescription is necessary we can send it directly to your pharmacy.  If lab work is needed we can place an order for that and you can then stop by our lab to have the test done at a later time.    Telephone visits are billed at different rates depending on your insurance coverage. During this emergency period, for some insurers they may be billed the same as an in-person visit.  Please reach out to your insurance provider with any questions.    If during the course of the call the physician/provider feels a telephone visit is not appropriate, you will not be charged for this service.\"    Patient has given verbal consent for Telephone visit?  Yes    What phone number would you like to be contacted at? [phone]    How would you like to obtain your AVS? Uriel    Subjective     Yael Johnson is a 29 year old female who presents via phone visit today for the following health issues:    HPI   Obesity wondering about phentermine    BACKGROUND  Hx of morbid obesity, BMI > 44, enlarged tonsils, moderate persistent asthma on albuterol inhaler & neb prn & Advair 100/50 bid, allergic to cats & dogs, FH of diabetes, prior left breast biopsy that was benign & still had  a lump in 2017, with hx of irregular menstrual cycle no longer on nuvaring, AUB on Micronor with mild improvement, u/S by gn outside [place] showed a endometrial polyp, low ferritin, palpitations, hx of JAYE, MDD, hx of SI, hx of a lot of cancellations & no shows,   Previously under care of PCP Wiley, Seen first time by this writer " on 18 for vaginal odor & treated for BV with Metrogel. Std screen was negative. Had left knee pain, exam was benign & advised quad strengthening , weight loss & referred to ENT for enlarged tonsils. Cbc, CMP, lipids, TSH, hep B,C, HIV , syphilis & GC were negative. Seen 19 by Beto for sore throat , no infection seen. GC oral was negative. Was given nuvaring,  Advair dose increased but not started, remains stable on prior lower dosing.    Seen 3/8/19 for palpitations, aware of it since the middle of 2018. Symptoms started the day her brother in law  of a heart attack  & thought maybe was anxiety initially then worried about dying from a heart attack due to obesity. When first started palpitations it would last a couple days and then over time few hours, but when seen reported it didn't t occur as frequently. Would usually note when she was lying down at night. Did not feel while at work or while walking to the bus unless had to run. Usually when occurred at night would say a prayer and go to bed and get up next am feeling fine. With the palpitations she had no associated symptoms. . She also noted her Mom was concerned about her having bipolar disorder. She & her friends did not think she had it but her Mom was concerned about bipolar and adult ADHD as mom had it. Noted periods of increased sexuality but no periods of getting by with no to less sleep & denied any visual or auditory hallucinations or psychosis.  Asthma was stable on Advair 100/50 & and albuterol prn. Noted did use her dads Symbicort inhaler when didn't have insurance & only used Albuterol neb if running out of meds. Exam was benign. Had no red flag symptom or signs. EKG showed NSR. Suspected anxiety and weight playing a role. Asthma was well controlled & continued on lower dosing of Advair.To work on lifestyle. Referred to psychology for diagnostic testing, counseling and then psyche if needed meds regarding concern about  family hx of bipolar & ADHD. Referred to cardiology, & Holter ordered & referred to bariatrics. To consider seeing sleep in future given obesity & possible hx of snoring. Was to see gyn for irregular menstrual cycle and to do pap with them.   CMP, TSH, iron, HCG, HB A1c, CBC, was normal. Ferritin was lower end of normal and Holter was normal.  Seen by gyn 3/2019 for irregular menses, recurrent BV, Cx was normal, pap not done & progesterone was normal. No showed to follow up & cancelled apt 4/4/, 4/11, 4/17 & 4/18. Seen by cardiology 4/17 & reassured heart was normal & to work on loosing weight. Seen in the ER 8/10 after drinking alcohol with Suicidal ideation but evaluated and discharged home. Seen then by [facility] 8/30 for AUB & given progesterone only pill & pelvic u/S ordered , [facility] and Zeeshan signed to get report. Pelvic u/S 9/19 showed a 1.2 cm upper uterine segment polyp or submucosal fibroid & a 3.3cm simple right ovarian cyst. Seen by Gyn and PAP and GC obtained was normal. Seen by gyn 10/3/19 & planned for hysteroscopy polypectomy for AUB & u/S findings. Seen by [facility] on 10/25/19 for vaginal odor but wet prep was normal. Cancelled and rescheduled from 11/29/19.     Seen 12/6/19 for atypical chest pain, right leg fatigue and shin bruise and left kneecap popping out.  Examination was benign. Chest pain did not sound cardiac. Extensive work up this past year with EKG, Holter and cardiology ruled out cardiac cause. Given weight and use of  birth control though  did check basic tests. Suspected had costochondritis related to weight and sitting slouched and stress. Advised that physical therapy and weight loss could help. Right leg fatigue did not sound like a DVT and left knee symptoms were suggestive of patella femoral syndrome/ subluxation. Asthma was reported well controlled but wheezing was heard and reported had not taken her meds that am. Gave herself an inhaler  while in the room. Asthma action plan given. Sylvie/ MDD noted  stable, & declined counseling or meds at the time.  Was advised to avoid alcohol.   To consider seeing bariatrics and sleep to evaluate and treat for  possible sleep apnea and help loose weight in the future. These referrals were given earlier.  CBC, CMP, ferritin, d-dimer and EKG were normal.  Doppler was negative and just showed the old bruise right anterior shin.  Did see sports medicine on 12/19 and diagnosed with subluxation of the left patella and referred to physical therapy which she says she has started doing.  No showed or canceled several times and finally rescheduled.    Seen 1/10/2020 for preop for endometrial polyp removal.  Prior to procedure.  Did on care on 4/19 for viral symptoms.[place]  negative.    CURRENTLY  Appointment made today to request phentermine for weight loss.  Did not make an appointment with bariatrics previously as discussed.    Procedure went well. Endometrial polyp had resolved and just had like an ablation  Period was normal, past month on 6th day late, take a pregnancy test and was negative. Wait few more days and recheck this week. Besides that very regular.     Asthma fine, felt a bit shortness of breath due to allergies. In April tested for covid and was negative. No fever or chills, no headache or dizziness, no double or blurry vision, no facial pain, earache, sore throat, runny nose, post nasal drip, no trouble hearing, smelling, tasting or swallowing, no cough , no chest pain, trouble breathing, lately left side of chest has sharp pain and happened before and tested was negative hx of palpitations, No abdominal pain, heart burn, reflux, nausea or vomiting or diarrhea or constipation, no blood in stools or black stools, no weight loss or night sweats. No dysuria, hematuria, frequency, urgency, hesitancy, incontinence, No pelvic complaints. No leg swelling. No rash.    Did PT a little for subluxation for patella,  not done in 4 months due to covid. Given exercises and doing at home made popping less.     MDD/ JAYE: generally happy     Obesity. [place] about phentermine for weight loss. At dentist apt saw commercial on TV. Went to web site. And screened if eligible and then speaks to nurse then doctor and then prescribed it. Then every month a BP check. Wanted to talk to discuss if ok given I know her health.     ACT = 22  PHQ-9 (Pfizer) 6/9/2020   1.  Little interest or pleasure in doing things 0   2.  Feeling down, depressed, or hopeless 0   3.  Trouble falling or staying asleep, or sleeping too much 2   4.  Feeling tired or having little energy 1   5.  Poor appetite or overeating 1   6.  Feeling bad about yourself 0   7.  Trouble concentrating 0   8.  Moving slowly or restless 0   9.  Suicidal or self-harm thoughts 0   PHQ-9 Total Score 4   Difficulty at work, home, or with people Not difficult at all   JAYE-7   Pf[employer] Inc, 2002; Used with Permission) 6/9/2020   1. Feeling nervous, anxious, or on edge 2   2. Not being able to stop or control worrying 1   3. Worrying too much about different things 1   4. Trouble relaxing 0   5. Being so restless that it is hard to sit still 0   6. Becoming easily annoyed or irritable 1   7. Feeling afraid, as if something awful might happen 1   JAYE-7 Total Score 6   If you checked any problems, how difficult have they made it for you to do your work, take care of things at home, or get along with other people? Not difficult at all     Patient Active Problem List   Diagnosis     Family history of diabetes mellitus     Enlarged tonsils     Moderate persistent asthma     Morbid obesity (H)     Irregular menstrual cycle     JAYE (generalized anxiety disorder)     Major depressive disorder, remission status unspecified, unspecified whether recurrent     Subluxation of left patella, sequela     Past Surgical History:   Procedure Laterality Date     US BREAST BIOPSY LT  2017    benign, still has a lump        Social History     Tobacco Use     Smoking status: Never Smoker     Smokeless tobacco: Never Used   Substance Use Topics     Alcohol use: Not Currently     Frequency: Never     Comment: OCC     Family History   Problem Relation Age of Onset     Hypertension Mother      Diabetes Mother         type 2     Arthritis Mother      Bipolar Disorder Mother         per mom     Diabetes Maternal Grandmother         type 2     Cardiovascular Paternal Grandfather         Lung         Current Outpatient Medications   Medication Sig Dispense Refill     ADVAIR DISKUS 100-50 MCG/DOSE inhaler INHALE 1 PUFF INTO THE LUNGS EVERY 12 HOURS 60 Inhaler 1     albuterol (2.5 MG/3ML) 0.083% nebulizer solution Take 1 vial (2.5 mg) by nebulization every 6 hours as needed for shortness of breath / dyspnea 1 Box 11     albuterol (PROAIR HFA/PROVENTIL HFA/VENTOLIN HFA) 108 (90 Base) MCG/ACT Inhaler Inhale 2 puffs into the lungs every 6 hours as needed for shortness of breath / dyspnea or wheezing 1 Inhaler 1     norethindrone (MICRONOR) 0.35 MG tablet Take 0.35 mg by mouth daily  4     Allergies   Allergen Reactions     Cats      Dogs      Recent Labs   Lab Test 12/06/19  1419 03/08/19  1232 05/31/18  1458  05/09/16  1606   A1C  --  5.3 5.5  --   --    LDL  --   --  52  --  70   HDL  --   --  29*  --  30*   TRIG  --   --  73  --  69   ALT 19 18 22  --  20   CR 0.90 0.76 0.73  --  1.02   GFRESTIMATED 88 >90 >90  --  67   GFRESTBLACK >90 >90 >90  --  81   POTASSIUM 3.7 4.0 3.5  --  3.8   TSH  --  0.58 0.55   < > 0.82    < > = values in this interval not displayed.      BP Readings from Last 3 Encounters:   01/10/20 122/72   12/06/19 138/72   08/10/19 116/74    Wt Readings from Last 3 Encounters:   06/09/20 120.2 kg (265 lb)   01/10/20 116.5 kg (256 lb 12 oz)   12/19/19 113.9 kg (251 lb)                    Reviewed and updated as needed this visit by Provider  Tobacco  Allergies  Meds  Problems  Med Hx  Surg Hx  Fam Hx  Soc Hx       "    Review of Systems   Constitutional, HEENT, cardiovascular, pulmonary, GI, , musculoskeletal, neuro, skin, endocrine and psych systems are negative, except as otherwise noted.       Objective   Reported vitals:  Ht 1.651 m (5' 5\")   Wt 120.2 kg (265 lb)   BMI 44.10 kg/m     healthy, alert, no distress and cooperative  PSYCH: Alert and oriented times 3; coherent speech, normal   rate and volume, able to articulate logical thoughts, able   to abstract reason, no tangential thoughts, no hallucinations   or delusions  Her affect is normal and pleasant  RESP: No cough, no audible wheezing, able to talk in full sentences  Remainder of exam unable to be completed due to telephone visits    Diagnostic Test Results:  Labs reviewed in Epic  none         Assessment/Plan:  1. Morbid obesity (H)  All patients who are obese (BMI ?30 kg/m2) should receive counseling on diet, lifestyle, and goals for weight loss. Candidates for drug therapy include those individuals with a BMI ?30 kg/m2, or a BMI of 27 to 29.9 kg/m2 with comorbidities, who have not met weight loss goals (loss of at least 5 percent of total body weight at three to six months) with a comprehensive lifestyle intervention. Phentermine is only approved by the US Food and Drug Administration (FDA) for the short-term (up to 12 weeks) treatment of obesity because of potential side effects, potential for abuse, and regulatory surveillance   Is not my practice to prescribe phentermine especially since it may worsen palpitations, blood pressure (blood pressure has been borderline in the past,  )and anxiety and comes with risk and is not long-term and usually weight gain occurs after stopping the medication.  I recommend she see bariatrics to further evaluate and treat her  comprehensively for her weight and they can discuss if phentermine is a good choice for her or not.  Consider referral to sleep to be evaluated for sleep apnea.  Continue care with sports medicine " regarding your knee.  Return in 3 months for a physical in the clinic.  - COMPREHENSIVE WEIGHT MANAGEMENT    2. Subluxation of left patella, sequela  Continue with home exercises and follow-up with sports med as needed  - COMPREHENSIVE WEIGHT MANAGEMENT    3. Irregular menstrual cycle  Reports menstrual cycle had regulated post endometrial polyp removal in January.  But now 6 days late at home pregnancy test negative but will recheck again this week.  Continue to monitor.  Could also be related to stress of current times and weight.  - COMPREHENSIVE WEIGHT MANAGEMENT    4. Family history of diabetes mellitus  - COMPREHENSIVE WEIGHT MANAGEMENT    5. Moderate persistent asthma without complication  Well-controlled currently.    6. Major depressive disorder, remission status unspecified, unspecified whether recurrent  Improved.  - COMPREHENSIVE WEIGHT MANAGEMENT    7. JAYE (generalized anxiety disorder)  JAYE mild to moderate stable declines need for counselor at this time.  Phentermine may affect anxiety.      Return in about 3 months (around 9/9/2020) for Physical Exam with PCP.      Phone call duration:  [phone] = 10 minutes    Angela Pelayo MD

## 2020-06-09 NOTE — PATIENT INSTRUCTIONS
All patients who are obese (BMI ?30 kg/m2) should receive counseling on diet, lifestyle, and goals for weight loss. Candidates for drug therapy include those individuals with a BMI ?30 kg/m2, or a BMI of 27 to 29.9 kg/m2 with comorbidities, who have not met weight loss goals (loss of at least 5 percent of total body weight at three to six months) with a comprehensive lifestyle intervention. ?Phentermine is only approved by the US Food and Drug Administration (FDA) for the short-term (up to 12 weeks) treatment of obesity because of potential side effects, potential for abuse, and regulatory surveillance   Is not my practice to prescribe phentermine especially since it may worsen palpitations, blood pressure (blood pressure has been borderline in the past,  )and anxiety and comes with risk and is not long-term and usually weight gain occurs after stopping the medication.  I recommend you see bariatrics to further evaluate and treat you comprehensively for your weight and they can discuss if phentermine is a good choice for her or not.  Consider referral to sleep to be evaluated for sleep apnea.  Continue care with sports medicine regarding your knee.  Return in 3 months for a physical in the clinic.

## 2020-06-10 ASSESSMENT — ANXIETY QUESTIONNAIRES: GAD7 TOTAL SCORE: 6

## 2020-06-10 ASSESSMENT — ASTHMA QUESTIONNAIRES: ACT_TOTALSCORE: 22

## 2020-06-19 ENCOUNTER — VIRTUAL VISIT (OUTPATIENT)
Dept: ENDOCRINOLOGY | Facility: CLINIC | Age: 30
End: 2020-06-19
Payer: COMMERCIAL

## 2020-06-19 VITALS — HEIGHT: 65 IN | BODY MASS INDEX: 44.82 KG/M2 | WEIGHT: 269 LBS

## 2020-06-19 DIAGNOSIS — E66.01 MORBID OBESITY (H): Primary | ICD-10-CM

## 2020-06-19 RX ORDER — PHENTERMINE HYDROCHLORIDE 37.5 MG/1
TABLET ORAL
Qty: 15 TABLET | Refills: 3 | Status: SHIPPED | OUTPATIENT
Start: 2020-06-19 | End: 2020-08-11

## 2020-06-19 ASSESSMENT — PAIN SCALES - GENERAL: PAINLEVEL: NO PAIN (0)

## 2020-06-19 ASSESSMENT — MIFFLIN-ST. JEOR: SCORE: 1956.06

## 2020-06-19 NOTE — LETTER
"2020       RE: Yael Johnson  [address]  [place] [address]     Dear Colleague,    Thank you for referring your patient, Yael Johnson, to the [facility]NAG[facility] at [facility]. Please see a copy of my visit note below.    Yael Johnson is a 29 year old female who is being evaluated via a billable video visit.      The patient has been notified of following:     \"This video visit will be conducted via a call between you and your physician/provider. We have found that certain health care needs can be provided without the need for an in-person physical exam.  This service lets us provide the care you need with a video conversation.  If a prescription is necessary we can send it directly to your pharmacy.  If lab work is needed we can place an order for that and you can then stop by our lab to have the test done at a later time.    Video visits are billed at different rates depending on your insurance coverage.  Please reach out to your insurance provider with any questions.    If during the course of the call the physician/provider feels a video visit is not appropriate, you will not be charged for this service.\"    Patient has given verbal consent for Video visit? Yes    Will anyone else be joining your video visit? No        New Medical Weight Management Consult    PATIENT:  Yael Johnson  MRN:         [id number]  :         1990  VINCENT:         2020    Dear Colleagues,    I had the pleasure of seeing your patient, Yael Johnson. Full intake/assessment was done to determine barriers to weight loss success and develop a treatment plan. Yael Johnson is a 29 year old female interested in treatment of medical problems associated with excess weight. She has a height of 5' 5\", a weight of 269 lbs 0 oz, and the calculated Body mass index is 44.76 kg/m .    ASSESSMENT/PLAN:  Morbid Obesity    Patient " Instructions:    Nice to talk with you today in virtual clinic    Start phentermine 1/2 tab in the morning to reduce your appetite. Monitor blood pressure and pulse. Goal BP<130/80, Pulse .    1,500 calorie meal plan to lose 1 lbs weekly without exercise (based on REE calc of 1,956)  Use meal replacements such as Yaneli's meals, Lean Cuisines, Healthy Choice, Smart Ones, Weight Watchers Meals, and Slim Fast and Glucerna shakes and supplement with fresh fruits and vegetables  Please drink a lot of water daily. Most people typically need about 2 liters of water daily and more if they are exercising, have a large weight, or have a fever or illness. Add Crystal Light for flavoring if desired. But no pop with calories in it.  Please keep a food journal of what you eat, calories in what you eat  Consider using applications for smart phones such as Lax.com, Venuelabs, yourdelivery, LoseIt, Tap&Track, and RelaxM.  Focus on wet volumetrics, meaning, eat more foods that are high in water and fiber such as fruits and vegetables in order to get that full feeling. These are also good for your overall health as well.  Check out Dr. Ruth Woo from [facility] - she has cookbooks with low calorie volumetric recipes  You can try Let's Dish to help you prepare meals for you and your family. Often times, the caloric and nutrition data and serving sizes are available for this food. This can be a time saving maneuver. The website can give you more information http://www.[employer].Las[employer]/  Check out Hello Fresh at https://www.SurgeonKidz.com/food-boxes/classic-box/  Try Cooking Light recipes for low calorie meal preparation and planning  Other food plan options you can search for on the internet and check out include: Tello XAVIER, [facility]    Start walking program working up to 4 miles daily and home exercises using videos and home equipment.    Please consider health psychologist to discuss how mood, depression and/or anxiety  impact your eating.    Psychological Providers    Check with your insurance to see if the psychologist is covered, if not, ask your insurance company for a referral.    [facility]   David Warner, PhD, LP   [phone]  Reva Torres, PhD, LP  [phone]  Bridgett Mojica, PhD                 [phone]    Edgar Murdock, Psy,LP             [phone]    [place]  Stacy Linares, PhD, LP  [phone]      [facility]:           [phone]  We send a referral and patient is notified.    [place]  [facility]:  [phone]  Cynthia BaptisteyD, [facility]  CYNTHIA ChoyD, LP         [phone]    [facility]   To schedule, please call member services on the back of your card.    AMY Pena PsyD,LP,ABPP [phone]    [place]  Benjamin Montano MA, [phone]    [place]  Cynthia MezayD, [phone]    [place]  Cynthia HernandezyD, [phone]  (fibromyalgia issues)    MARIO ALBERTO Smith  [phone]    [place]  Cynthia GrantyD,LP  [phone]  Arnoldo Cavazos, PhD, LP  [phone]  Arnoldo Desai, PhD, LP             [phone]    [place]  Pablo Rao , [facility], LMFT [phone]    [address]  Lurdes De Leon, CynthiayD, [phone]    MARIO ALBERTO Alcazar, CnythiayD, LP   [phone]     [place]      Outreach [phone]   Tatyana Britt MA, LP  - ext. [phone]  Arnoldo Malcolm, PhD, LP - ext [phone]  Allan Mendoza, [place], LP - ext [phone]    [place]  Cristobal Connor, PhD, [phone]  Se Fraser, PsyD, [phone]          [place] [place], PhD, [phone]    [facility] [place] White PsyD,   [phone]            Call updates to HUGO Miranda    [phone]  Last updated: 2019      RTC: 2-3 months    Best Wishes,  Dr. Guerda Pacheco MD, MPH  Endocrinologist    She has the following  "co-morbidities: Asthma, controlled    PATIENT DID NOT DO HER SURVEY PRIOR TO THE APPOINTMENT, SO DATA FOR THIS VISIT IS VERY LIMITED.    PAST MEDICAL HISTORY:  Past Medical History:   Diagnosis Date     Abnormal uterine bleeding (AUB) 11/28/2019     Asthma      Enlarged tonsils      Low ferritin 4/11/2019     Palpitations 4/11/2019     Uterine polyp 11/28/2019       MEDICATIONS:   Current Outpatient Medications   Medication Sig Dispense Refill     ADVAIR DISKUS 100-50 MCG/DOSE inhaler INHALE 1 PUFF INTO THE LUNGS EVERY 12 HOURS 60 Inhaler 1     albuterol (2.5 MG/3ML) 0.083% nebulizer solution Take 1 vial (2.5 mg) by nebulization every 6 hours as needed for shortness of breath / dyspnea 1 Box 11     albuterol (PROAIR HFA/PROVENTIL HFA/VENTOLIN HFA) 108 (90 Base) MCG/ACT Inhaler Inhale 2 puffs into the lungs every 6 hours as needed for shortness of breath / dyspnea or wheezing 1 Inhaler 1     norethindrone (MICRONOR) 0.35 MG tablet Take 0.35 mg by mouth daily  4       ALLERGIES:   Allergies   Allergen Reactions     Cats      Dogs        PHYSICAL EXAM:  Ht 1.651 m (5' 5\")   Wt 122 kg (269 lb)   BMI 44.76 kg/m    There were no vitals taken for this virtual visit.  Gen: well appearing, nad, pleasant and conversant  HEENT: anicteric, EOMI, no proptosis or lid lag, no goiter  Neuro: A&O    FOLLOW-UP:   12 weeks.    TIME: 25 min spent on evaluation, management, counseling, education, & motivational interviewing with greater than 50% of the total time was spent on counseling and coordinating care    Sincerely,    Dr. Guerda Pacheco MD, MPH  Endocrinologist        Video-Visit Details    Type of service:  Video Visit    Video Start/Stop Times: Start: 06/19/2020 02:05 pm   Stop: 06/19/2020 02:18 pm    Originating Location (pt. Location): Home    Distant Location (provider location):  [facility] WE[facility]NAGEMENT     Platform used for Video Visit: MCE-5 DevelopmentWell            Again, thank you for allowing me to participate in " the care of your patient.      Sincerely,    Guerda Pacheco MD

## 2020-06-19 NOTE — PROGRESS NOTES
"Yael Johnson is a 29 year old female who is being evaluated via a billable video visit.      The patient has been notified of following:     \"This video visit will be conducted via a call between you and your physician/provider. We have found that certain health care needs can be provided without the need for an in-person physical exam.  This service lets us provide the care you need with a video conversation.  If a prescription is necessary we can send it directly to your pharmacy.  If lab work is needed we can place an order for that and you can then stop by our lab to have the test done at a later time.    Video visits are billed at different rates depending on your insurance coverage.  Please reach out to your insurance provider with any questions.    If during the course of the call the physician/provider feels a video visit is not appropriate, you will not be charged for this service.\"    Patient has given verbal consent for Video visit? Yes    Will anyone else be joining your video visit? No        New Medical Weight Management Consult    PATIENT:  Yael Johnson  MRN:         [id number]  :         1990  VINCENT:         2020    Dear Colleagues,    I had the pleasure of seeing your patient, Yael Johnson. Full intake/assessment was done to determine barriers to weight loss success and develop a treatment plan. Yael Johnson is a 29 year old female interested in treatment of medical problems associated with excess weight. She has a height of 5' 5\", a weight of 269 lbs 0 oz, and the calculated Body mass index is 44.76 kg/m .    ASSESSMENT/PLAN:  Morbid Obesity    Patient Instructions:    Nice to talk with you today in virtual clinic    Start phentermine 1/2 tab in the morning to reduce your appetite. Monitor blood pressure and pulse. Goal BP<130/80, Pulse .    1,500 calorie meal plan to lose 1 lbs weekly without exercise (based on REE calc of 1,956)  Use meal replacements such as " Yaneli's meals, Lean Cuisines, Healthy Choice, Smart Ones, Weight Watchers Meals, and Slim Fast and Glucerna shakes and supplement with fresh fruits and vegetables  Please drink a lot of water daily. Most people typically need about 2 liters of water daily and more if they are exercising, have a large weight, or have a fever or illness. Add Crystal Light for flavoring if desired. But no pop with calories in it.  Please keep a food journal of what you eat, calories in what you eat  Consider using applications for smart phones such as Mailgun, NeoReach, YadaHome, LoseIt, Tap&Track, and RelaxM.  Focus on wet volumetrics, meaning, eat more foods that are high in water and fiber such as fruits and vegetables in order to get that full feeling. These are also good for your overall health as well.  Check out Dr. Ruth Woo from [facility] - she has cookbooks with low calorie volumetric recipes  You can try Let's Dish to help you prepare meals for you and your family. Often times, the caloric and nutrition data and serving sizes are available for this food. This can be a time saving maneuver. The website can give you more information http://www.Capevo.MiArch/  Check out Hello Fresh at https://www.DestinationRX.com/food-boxes/classic-box/  Try Cooking Light recipes for low calorie meal preparation and planning  Other food plan options you can search for on the internet and check out include: Tello XAVIER, [facility]    Start walking program working up to 4 miles daily and home exercises using videos and home equipment.    Please consider health psychologist to discuss how mood, depression and/or anxiety impact your eating.    Psychological Providers    Check with your insurance to see if the psychologist is covered, if not, ask your insurance company for a referral.    [facility]   David Warner, PhD, LP   [phone]  Reva Torres, PhD, LP  [phone]  Bridgett Mojica, PhD                  [phone]    ClymerHumaira Murdock, Psy,LP             [phone]    [place]  Stacy Linares, PhD, LP  [phone]      [facility] Service:           [phone]  We send a referral and patient is notified.    [place]  [facility]:  [phone]  Juan A BaptisteyD, [facility]  Funmi Michael, PSyD, LP         [phone]    [facility]   To schedule, please call member services on the back of your card.    AMY Pena PsyD,LP,[facility] [phone]    [place]  Benjamin Montano MA, [phone]    [place]  Gina Soler PsyD, [phone]    [place]  Theodora Levin, PsyD, [phone]  (fibromyalgia issues)    MARIO ALBERTO Smith  [phone]    [place]  Mari Patel,PsyD,LP  [phone]  Arnoldo Cavazos, PhD, LP  [phone]  Arnoldo Desai, PhD, LP             [phone]    [place]  Pablo Rao , [facility], LMFT [phone]    [place]  Lurdes De Leon, PsyD, [phone]    MARIO ALBERTO Alcazar, PsyD, LP   [phone]     [place]      Outreach [phone]   Tatyana Britt MA, LP  - ext. [phone]  Arnoldo Malcolm, PhD, LP - ext [phone]  Juan A GloriayD, LP - ext [phone]    [place]  Cristobal Connor, PhD, [phone]  Se Fraser PsyD, [phone]          [place]  Charito Keene, PhD, [phone]    SingerChance White PsyD, LP  [phone]            Call updates to HUGO Miranda    [phone]  Last updated: 2019      RTC: 2-3 months    Best Wishes,  Dr. Guerda Pacheco MD, MPH  Endocrinologist    She has the following co-morbidities: Asthma, controlled    PATIENT DID NOT DO HER SURVEY PRIOR TO THE APPOINTMENT, SO DATA FOR THIS VISIT IS VERY LIMITED.    PAST MEDICAL HISTORY:  Past Medical History:   Diagnosis Date     Abnormal uterine bleeding (AUB) 2019     Asthma      Enlarged tonsils      Low ferritin 2019      "Palpitations 4/11/2019     Uterine polyp 11/28/2019       MEDICATIONS:   Current Outpatient Medications   Medication Sig Dispense Refill     ADVAIR DISKUS 100-50 MCG/DOSE inhaler INHALE 1 PUFF INTO THE LUNGS EVERY 12 HOURS 60 Inhaler 1     albuterol (2.5 MG/3ML) 0.083% nebulizer solution Take 1 vial (2.5 mg) by nebulization every 6 hours as needed for shortness of breath / dyspnea 1 Box 11     albuterol (PROAIR HFA/PROVENTIL HFA/VENTOLIN HFA) 108 (90 Base) MCG/ACT Inhaler Inhale 2 puffs into the lungs every 6 hours as needed for shortness of breath / dyspnea or wheezing 1 Inhaler 1     norethindrone (MICRONOR) 0.35 MG tablet Take 0.35 mg by mouth daily  4       ALLERGIES:   Allergies   Allergen Reactions     Cats      Dogs        PHYSICAL EXAM:  Ht 1.651 m (5' 5\")   Wt 122 kg (269 lb)   BMI 44.76 kg/m    There were no vitals taken for this virtual visit.  Gen: well appearing, nad, pleasant and conversant  HEENT: anicteric, EOMI, no proptosis or lid lag, no goiter  Neuro: A&O    FOLLOW-UP:   12 weeks.    TIME: 25 min spent on evaluation, management, counseling, education, & motivational interviewing with greater than 50% of the total time was spent on counseling and coordinating care    Sincerely,    Dr. Guerda Pacheco MD, MPH  Endocrinologist        Video-Visit Details    Type of service:  Video Visit    Video Start/Stop Times: Start: 06/19/2020 02:05 pm   Stop: 06/19/2020 02:18 pm    Originating Location (pt. Location): Home    Distant Location (provider location):  [facility]     Platform used for Video Visit: ROKT          "

## 2020-06-19 NOTE — PATIENT INSTRUCTIONS
Nice to talk with you today in virtual clinic    Start phentermine 1/2 tab in the morning to reduce your appetite. Monitor blood pressure and pulse. Goal BP<130/80, Pulse .    1,500 calorie meal plan to lose 1 lbs weekly without exercise (based on REE calc of 1,956)  Use meal replacements such as Yaneli's meals, Lean Cuisines, Healthy Choice, Smart Ones, Weight Watchers Meals, and Slim Fast and Glucerna shakes and supplement with fresh fruits and vegetables  Please drink a lot of water daily. Most people typically need about 2 liters of water daily and more if they are exercising, have a large weight, or have a fever or illness. Add Crystal Light for flavoring if desired. But no pop with calories in it.  Please keep a food journal of what you eat, calories in what you eat  Consider using applications for smart phones such as SMS Assist, [employer], Alpha Smart Systems, LoseIt, Tap&Track, and RelaxM.  Focus on wet volumetrics, meaning, eat more foods that are high in water and fiber such as fruits and vegetables in order to get that full feeling. These are also good for your overall health as well.  Check out Dr. Ruth Woo from [facility] - she has cookbooks with low calorie volumetric recipes  You can try Let's Dish to help you prepare meals for you and your family. Often times, the caloric and nutrition data and serving sizes are available for this food. This can be a time saving maneuver. The website can give you more information http://www.Mission Research.VM6 Software/  Check out Hello Fresh at https://www.Tenlegs.VM6 Software/food-boxes/classic-box/  Try Cooking Light recipes for low calorie meal preparation and planning  Other food plan options you can search for on the internet and check out include: Tello XAVIER, [facility]    Start walking program working up to 4 miles daily and home exercises using videos and home equipment.    Please consider health psychologist to discuss how mood, depression and/or anxiety impact your  eating.    Psychological Providers    Check with your insurance to see if the psychologist is covered, if not, ask your insurance company for a referral.    [facility]   David Warner, PhD, LP   [phone]  Reva Torres, PhD, LP  [phone]  Bridgett Mojica, PhD                 [phone]    Edgar Murdock, Psy,LP             [phone]    [place]  Stacy Linares, PhD, LP  [phone]      [facility] Service:           [phone]  We send a referral and patient is notified.    [place]  [facility]:  [phone]  Cynthia BaptisteyD, [facility]  CYNTHIA ChoyD, LP         [phone]    [facility]   To schedule, please call member services on the back of your card.    AMY Pena PsyD,LP,ABPP [phone]    [place]  Benjamin Montano MA, [phone]    [place]  Cynthia MezayD, [phone]    [place]  Cynthia HernandezyD, [phone]  (fibromyalgia issues)    MARIO ALBERTO Smith  [phone]    [place]  Cynthia GrantyD,LP  [phone]  Arnoldo Cavazos, PhD, LP  [phone]  Arnoldo Desai, PhD, LP             [phone]    [place]  Pablo Rao , [facility], LMFT [phone]    [place]  Cynthia SainiyD, [phone]    MARIO ALBERTO Alcazar PsyD, LP   [phone]     [place]      Outreach [phone]   Tatyana Britt MA, LP  - ext. [phone]  Arnoldo Malcolm, PhD, LP - ext [phone]  Allan Mendoza, CynthiayD, LP - ext [phone]    [place]  Cristobal Connor, PhD, [phone]  Se Fraser, PsyD, [phone]          [facility] Keene, PhD, [phone]    [facility] Chance White PsyD,   [phone]            Call updates to HUGO Miranda    [phone]  Last updated: 2019      RTC: 3 months    Best Wishes,  Dr. Guerda Pacheco MD, MPH  Endocrinologist

## 2020-06-22 ENCOUNTER — TELEPHONE (OUTPATIENT)
Dept: ENDOCRINOLOGY | Facility: CLINIC | Age: 30
End: 2020-06-22

## 2020-06-22 NOTE — TELEPHONE ENCOUNTER
"Prior Authorization Retail Medication Request    Medication/Dose: Phentermine  ICD code (if different than what is on RX):  Morbid obesity (H) [E66.01]  - Primary     Previously Tried and Failed:  History of diet and exercise  Rationale:  Yael Johnson is a 29 year old female interested in treatment of medical problems associated with excess weight. She has a height of 5' 5\", a weight of 269 lbs 0 oz, and the calculated Body mass index is 44.76 kg/m .     Insurance Name:    Insurance ID:        Pharmacy Information (if different than what is on RX)  Name:  [facility] #[id number] - [place], [place] - [address] [place] [address]   Phone: [phone]    "

## 2020-06-22 NOTE — TELEPHONE ENCOUNTER
PA Initiation    Medication: Phentermine  Insurance Company: [employer] - Phone [phone] Fax [phone]  Pharmacy Filling the Rx: [id number] - [place], [place] - [address] [place] [address] AVE   Filling Pharmacy Phone: [phone]  Filling Pharmacy Fax:    Start Date: 6/22/2020    Central Prior Authorization Team   Phone: [phone]

## 2020-06-25 NOTE — TELEPHONE ENCOUNTER
Prior Authorization Follow Up    Called [facility] - Phone [phone] Fax [phone] to check status of Phentermine. Per representative patient does not have active coverage through them any more. Called [facility] to see what her new insurance is, per tech its now [employer], submited new request via [facility], new Key is [id number]

## 2020-06-26 NOTE — TELEPHONE ENCOUNTER
Prior Authorization Approval    Authorization Effective Date: 5/27/2020  Authorization Expiration Date: 9/24/2020  Medication: Phentermine  Approved Dose/Quantity: 15  Reference #: [id number]   Insurance Company: [employer] - Phone [phone] Fax [phone]  Which Pharmacy is filling the prescription (Not needed for infusion/clinic administered): [id number] - [place], [place] - [address] [place] [address]  Pharmacy Notified: Yes  Patient Notified:  **Instructed pharmacy to notify patient when script is ready to /ship.**

## 2020-07-02 NOTE — PROGRESS NOTES
"Yael Johnson is a 29 year old female who is being evaluated via a billable video visit.      The patient has been notified of following:     \"This video visit will be conducted via a call between you and your physician/provider. We have found that certain health care needs can be provided without the need for an in-person physical exam.  This service lets us provide the care you need with a video conversation.  If a prescription is necessary we can send it directly to your pharmacy.  If lab work is needed we can place an order for that and you can then stop by our lab to have the test done at a later time.    Video visits are billed at different rates depending on your insurance coverage.  Please reach out to your insurance provider with any questions.    If during the course of the call the physician/provider feels a video visit is not appropriate, you will not be charged for this service.\"    Patient has given verbal consent for Video visit? Yes  How would you like to obtain your AVS? Uriel  Patient would like the video invitation sent by: Text to cell phone: [phone]  Will anyone else be joining your video visit? No        Video-Visit Details    Type of service:  Video Visit    Video Start Time: 9:05AM  Video End Time: 9:14AM    Patient not seen today. Called patient at 9:14AM to see if she was having difficulty with video login. States something came up and she forgot to cancel appointment and needs to reschedule.   Jie Cote PA-C  "

## 2020-07-03 ENCOUNTER — VIRTUAL VISIT (OUTPATIENT)
Dept: SLEEP MEDICINE | Facility: CLINIC | Age: 30
End: 2020-07-03
Attending: FAMILY MEDICINE
Payer: COMMERCIAL

## 2020-07-03 DIAGNOSIS — Z87.898 HISTORY OF EDEMA: ICD-10-CM

## 2020-07-03 DIAGNOSIS — E66.01 MORBID OBESITY (H): ICD-10-CM

## 2020-08-10 ENCOUNTER — MYC MEDICAL ADVICE (OUTPATIENT)
Dept: ENDOCRINOLOGY | Facility: CLINIC | Age: 30
End: 2020-08-10

## 2020-08-10 DIAGNOSIS — E66.01 MORBID OBESITY (H): ICD-10-CM

## 2020-08-11 RX ORDER — PHENTERMINE HYDROCHLORIDE 37.5 MG/1
TABLET ORAL
Qty: 30 TABLET | Refills: 3 | Status: SHIPPED | OUTPATIENT
Start: 2020-08-11 | End: 2020-11-12

## 2020-08-23 ENCOUNTER — OFFICE VISIT (OUTPATIENT)
Dept: URGENT CARE | Facility: URGENT CARE | Age: 30
End: 2020-08-23
Payer: COMMERCIAL

## 2020-08-23 VITALS
HEART RATE: 68 BPM | BODY MASS INDEX: 42.49 KG/M2 | WEIGHT: 255 LBS | SYSTOLIC BLOOD PRESSURE: 132 MMHG | DIASTOLIC BLOOD PRESSURE: 86 MMHG | HEIGHT: 65 IN | OXYGEN SATURATION: 99 % | TEMPERATURE: 98.5 F

## 2020-08-23 DIAGNOSIS — R07.89 CHEST TIGHTNESS: ICD-10-CM

## 2020-08-23 DIAGNOSIS — K29.00 ACUTE GASTRITIS WITHOUT HEMORRHAGE, UNSPECIFIED GASTRITIS TYPE: Primary | ICD-10-CM

## 2020-08-23 PROCEDURE — 93000 ELECTROCARDIOGRAM COMPLETE: CPT | Performed by: PHYSICIAN ASSISTANT

## 2020-08-23 PROCEDURE — 99214 OFFICE O/P EST MOD 30 MIN: CPT | Performed by: PHYSICIAN ASSISTANT

## 2020-08-23 RX ORDER — ONDANSETRON 8 MG/1
8 TABLET, ORALLY DISINTEGRATING ORAL EVERY 8 HOURS PRN
Qty: 30 TABLET | Refills: 0 | Status: SHIPPED | OUTPATIENT
Start: 2020-08-23 | End: 2020-09-02

## 2020-08-23 ASSESSMENT — ENCOUNTER SYMPTOMS
PSYCHIATRIC NEGATIVE: 1
ANAL BLEEDING: 0
DIARRHEA: 0
BLOOD IN STOOL: 0
ABDOMINAL PAIN: 0
RECTAL PAIN: 0
EYES NEGATIVE: 1
NEUROLOGICAL NEGATIVE: 1
CARDIOVASCULAR NEGATIVE: 1
ENDOCRINE NEGATIVE: 1
ABDOMINAL DISTENTION: 0
CONSTITUTIONAL NEGATIVE: 1
CONSTIPATION: 0
NAUSEA: 1
MUSCULOSKELETAL NEGATIVE: 1
RESPIRATORY NEGATIVE: 1
VOMITING: 1

## 2020-08-23 ASSESSMENT — MIFFLIN-ST. JEOR: SCORE: 1892.55

## 2020-08-24 NOTE — PATIENT INSTRUCTIONS
Patient Education     Gastritis (Adult)    Gastritis is inflammation and irritation of the stomach lining. You can have it for a short time (acute) or be long lasting (chronic). Infection with bacteria called H pylori most often causes gastritis. More than a third of people in the [place] have these bacteria in their bodies. In many cases, H pylori causes no problems or symptoms. In some people, though, the infection irritates the stomach lining and causes gastritis. H. pylori may be diagnosed through blood, stool, or breath tests, we well as through biopsy during an endoscopy. Other causes of stomach irritation include drinking alcohol, smoking or chewing tobacco, or taking pain-relieving medicines called NSAIDs (such as aspirin or ibuprofen). Certain drugs (such as cocaine) and immune conditions can also cause gastritis.  Symptoms of gastritis can include:    Belly pain or bloating    Feeling full quickly    Loss of appetite    Nausea or vomiting    Vomiting blood or having black stools    Feeling more tired than usual  An inflamed and irritated stomach lining is more likely to develop a sore called an ulcer. To help prevent this, gastritis should be treated.  Home care  If needed, our healthcare provider may prescribe medicines. If you have H pylori infection, treating it will likely relieve your symptoms. Other changes can help reduce stomach irritation and help it heal.    If you have been prescribed medicines for H pylori infection, take them as directed. Take all of the medicine until it is finished or your healthcare provider tells you to stop, even if you feel better.    Your healthcare provider may advise you not to take NSAIDs. If you take daily aspirin for your heart or other medical reasons, do not stop without talking to your healthcare provider first.    Don't drink alcohol.    Stop smoking. Smoking can irritate the stomach and delay healing. As much as possible, stay away from second hand  smoke.  Follow-up care  Follow up with your healthcare provider, or as advised by our staff. You may need testing to check for inflammation or an ulcer.  When to seek medical advice  Call your healthcare provider for any of the following:    Stomach pain that gets worse or moves to the lower right belly (appendix area)    Chest pain that appears or gets worse, or spreads to the back, neck, shoulder, or arm    Frequent vomiting (can t keep down liquids)    Blood in the stool or vomit (red or black in color)    Feeling weak or dizzy    Shortness of breath    Unexplained weight loss    Fever of 100.4 F (38 C) or higher, or as directed by your healthcare provider  Date Last Reviewed: 3/1/2018    [id number] [employer]. [address] [place] [address], [place] [address]. All rights reserved. This information is not intended as a substitute for professional medical care. Always follow your healthcare professional's instructions.         Call if you have bloody stools or sharp pain under the ribs. Go to the emergency department if you are unable to tolerate water intake.     Use Zofran three times a day.

## 2020-08-24 NOTE — PROGRESS NOTES
SUBJECTIVE:   Yael Johnson is a 29 year old female presenting with a chief complaint of   Chief Complaint   Patient presents with     Urgent Care     Pt in clinic to have eval for chest tightness, vomiting, headache, fatigue, and nausea.     Nausea     Pt states she does have Hx of Asthma.     Vomiting     Headache     Chest Pain       She is an established patient of [facility].    Onset of symptoms was 3 day(s) ago.  Course of illness is waxing and waning.    Severity moderate  Current and Associated symptoms:  Chest tightness, vomiting, headache and Nausea  Aggravating factors: eating.    Alleviating factors:nothing  Diarrhea: No  Stools: Normal  Vomitting: Yes  3 times/day and is decreasing  Appetite: decreased and poor  Risk factors: Patient was recently at a wedding and ate food that her body was not accustomed to (Chicken and alcohol)    Review of Systems   Constitutional: Negative.    HENT: Negative.    Eyes: Negative.    Respiratory: Negative.    Cardiovascular: Negative.    Gastrointestinal: Positive for nausea and vomiting. Negative for abdominal distention, abdominal pain, anal bleeding, blood in stool, constipation, diarrhea and rectal pain.   Endocrine: Negative.    Genitourinary: Negative.    Musculoskeletal: Negative.    Neurological: Negative.    Psychiatric/Behavioral: Negative.        Past Medical History:   Diagnosis Date     Abnormal uterine bleeding (AUB) 11/28/2019     Asthma      Enlarged tonsils      Low ferritin 4/11/2019     Palpitations 4/11/2019     Uterine polyp 11/28/2019     Family History   Problem Relation Age of Onset     Hypertension Mother      Diabetes Mother         type 2     Arthritis Mother      Bipolar Disorder Mother         per mom     Diabetes Maternal Grandmother         type 2     Cardiovascular Paternal Grandfather         Lung     Current Outpatient Medications   Medication Sig Dispense Refill     ADVAIR DISKUS 100-50 MCG/DOSE inhaler INHALE 1 PUFF INTO THE LUNGS  "EVERY 12 HOURS 60 Inhaler 1     norethindrone (MICRONOR) 0.35 MG tablet Take 0.35 mg by mouth daily  4     phentermine (ADIPEX-P) 37.5 MG tablet Take 1 tab daily in am 30 tablet 3     albuterol (2.5 MG/3ML) 0.083% nebulizer solution Take 1 vial (2.5 mg) by nebulization every 6 hours as needed for shortness of breath / dyspnea (Patient not taking: Reported on 8/23/2020) 1 Box 11     albuterol (PROAIR HFA/PROVENTIL HFA/VENTOLIN HFA) 108 (90 Base) MCG/ACT Inhaler Inhale 2 puffs into the lungs every 6 hours as needed for shortness of breath / dyspnea or wheezing (Patient not taking: Reported on 8/23/2020) 1 Inhaler 1     Social History     Tobacco Use     Smoking status: Never Smoker     Smokeless tobacco: Never Used   Substance Use Topics     Alcohol use: Not Currently     Frequency: Never     Comment: OCC       OBJECTIVE  /86   Pulse 68   Temp 98.5  F (36.9  C) (Oral)   Ht 1.651 m (5' 5\")   Wt 115.7 kg (255 lb)   SpO2 99%   BMI 42.43 kg/m      Physical Exam  Constitutional:       General: She is not in acute distress.     Appearance: Normal appearance. She is normal weight. She is not ill-appearing, toxic-appearing or diaphoretic.   HENT:      Head: Normocephalic and atraumatic.   Neck:      Musculoskeletal: Normal range of motion and neck supple. No muscular tenderness.   Cardiovascular:      Rate and Rhythm: Normal rate.      Pulses: Normal pulses.      Heart sounds: Normal heart sounds. No murmur. No friction rub. No gallop.    Pulmonary:      Effort: Pulmonary effort is normal. No respiratory distress.      Breath sounds: Normal breath sounds. No stridor. No wheezing, rhonchi or rales.   Chest:      Chest wall: No tenderness.   Abdominal:      General: Abdomen is flat. Bowel sounds are normal. There is no distension.      Palpations: Abdomen is soft. There is no mass.      Tenderness: There is no abdominal tenderness. There is no right CVA tenderness, left CVA tenderness, guarding or rebound.      " Hernia: No hernia is present.   Lymphadenopathy:      Cervical: No cervical adenopathy.   Neurological:      General: No focal deficit present.      Mental Status: She is alert and oriented to person, place, and time. Mental status is at baseline.      Gait: Gait normal.   Psychiatric:         Mood and Affect: Mood normal.         Behavior: Behavior normal.         Thought Content: Thought content normal.         Judgment: Judgment normal.       EKG was done.       EKG Interpretation:      Interpreted by Rodri Guaman PA-C    Symptoms at time of EKG: None   Rhythm: Normal sinus   Rate: Normal  Axis: Normal  Ectopy: None  Conduction: Normal  ST Segments/ T Waves: No ST-T wave changes and No acute ischemic changes  Q Waves: None  Comparison to prior: No old EKG available    Clinical Impression: normal EKG    ASSESSMENT/PLAN:    (K29.00) Acute gastritis without hemorrhage, unspecified gastritis type  (primary encounter diagnosis)  Plan: ondansetron (ZOFRAN-ODT) 8 MG ODT tab    (R07.89) Chest tightness  Plan: EKG 12-lead complete w/read - Clinics,         ondansetron (ZOFRAN-ODT) 8 MG ODT tab    Patient will need to drink at least 1.5-2 liters of fluids daily for adults and 1-1.5 liters for children. If vomiting and not tolerating liquids for more than 24 hrs, please go to your nearest emergency department for IV fluids and further treatment.     Maintain hydration by drinking small amounts of clear fluids frequently, then soft diet, and then advance diet as tolerated. May use any prescribed imodium if desired for any diarrhea. Call if symptoms worsen, high fever, severe weakness or fainting, increased abdominal pain, blood in stool or vomit, or failure to improve in 2-3 days.     Patient was advised to return to clinic if symptoms do not improve in the amount of time specified in the AVS or if symptoms worsen. Patient educated on red flag symptoms and asked to go directly to the ED if symptoms present themselves.      Rodri Guaman PA-C on 8/23/2020 at 8:04 PM

## 2020-10-23 PROBLEM — R29.898 LEG FATIGUE: Status: ACTIVE | Noted: 2020-10-23

## 2020-10-23 PROBLEM — Z87.898 HISTORY OF EDEMA: Status: ACTIVE | Noted: 2020-10-23

## 2020-11-12 ENCOUNTER — OFFICE VISIT (OUTPATIENT)
Dept: FAMILY MEDICINE | Facility: CLINIC | Age: 30
End: 2020-11-12
Payer: COMMERCIAL

## 2020-11-12 VITALS
RESPIRATION RATE: 16 BRPM | HEART RATE: 96 BPM | WEIGHT: 267.2 LBS | DIASTOLIC BLOOD PRESSURE: 78 MMHG | BODY MASS INDEX: 44.52 KG/M2 | TEMPERATURE: 98.4 F | SYSTOLIC BLOOD PRESSURE: 123 MMHG | HEIGHT: 65 IN

## 2020-11-12 DIAGNOSIS — R07.89 ATYPICAL CHEST PAIN: ICD-10-CM

## 2020-11-12 DIAGNOSIS — R10.30 LOWER ABDOMINAL PAIN: ICD-10-CM

## 2020-11-12 DIAGNOSIS — R29.898 LEG FATIGUE: ICD-10-CM

## 2020-11-12 DIAGNOSIS — Z11.3 SCREEN FOR STD (SEXUALLY TRANSMITTED DISEASE): ICD-10-CM

## 2020-11-12 DIAGNOSIS — F32.9 MAJOR DEPRESSIVE DISORDER, REMISSION STATUS UNSPECIFIED, UNSPECIFIED WHETHER RECURRENT: ICD-10-CM

## 2020-11-12 DIAGNOSIS — J45.40 MODERATE PERSISTENT ASTHMA WITHOUT COMPLICATION: ICD-10-CM

## 2020-11-12 DIAGNOSIS — R10.2 PELVIC PAIN IN FEMALE: ICD-10-CM

## 2020-11-12 DIAGNOSIS — E66.01 MORBID OBESITY (H): ICD-10-CM

## 2020-11-12 DIAGNOSIS — F41.1 GAD (GENERALIZED ANXIETY DISORDER): ICD-10-CM

## 2020-11-12 DIAGNOSIS — Z87.898 HISTORY OF EDEMA: ICD-10-CM

## 2020-11-12 DIAGNOSIS — Z83.3 FAMILY HISTORY OF DIABETES MELLITUS: ICD-10-CM

## 2020-11-12 DIAGNOSIS — G44.219 EPISODIC TENSION-TYPE HEADACHE, NOT INTRACTABLE: ICD-10-CM

## 2020-11-12 DIAGNOSIS — Z00.00 ROUTINE HISTORY AND PHYSICAL EXAMINATION OF ADULT: Primary | ICD-10-CM

## 2020-11-12 LAB
ALBUMIN SERPL-MCNC: 3.8 G/DL (ref 3.4–5)
ALBUMIN UR-MCNC: NEGATIVE MG/DL
ALP SERPL-CCNC: 65 U/L (ref 40–150)
ALT SERPL W P-5'-P-CCNC: 21 U/L (ref 0–50)
ANION GAP SERPL CALCULATED.3IONS-SCNC: 6 MMOL/L (ref 3–14)
APPEARANCE UR: CLEAR
AST SERPL W P-5'-P-CCNC: 13 U/L (ref 0–45)
BASOPHILS # BLD AUTO: 0 10E9/L (ref 0–0.2)
BASOPHILS NFR BLD AUTO: 0.1 %
BILIRUB SERPL-MCNC: 0.4 MG/DL (ref 0.2–1.3)
BILIRUB UR QL STRIP: NEGATIVE
BUN SERPL-MCNC: 14 MG/DL (ref 7–30)
CALCIUM SERPL-MCNC: 8.9 MG/DL (ref 8.5–10.1)
CHLORIDE SERPL-SCNC: 108 MMOL/L (ref 94–109)
CHOLEST SERPL-MCNC: 106 MG/DL
CO2 SERPL-SCNC: 23 MMOL/L (ref 20–32)
COLOR UR AUTO: YELLOW
CREAT SERPL-MCNC: 0.73 MG/DL (ref 0.52–1.04)
DIFFERENTIAL METHOD BLD: NORMAL
EOSINOPHIL # BLD AUTO: 0.2 10E9/L (ref 0–0.7)
EOSINOPHIL NFR BLD AUTO: 2.4 %
ERYTHROCYTE [DISTWIDTH] IN BLOOD BY AUTOMATED COUNT: 14.4 % (ref 10–15)
GFR SERPL CREATININE-BSD FRML MDRD: >90 ML/MIN/{1.73_M2}
GLUCOSE SERPL-MCNC: 89 MG/DL (ref 70–99)
GLUCOSE UR STRIP-MCNC: NEGATIVE MG/DL
HBV SURFACE AB SERPL IA-ACNC: >1000 M[IU]/ML
HBV SURFACE AG SERPL QL IA: NONREACTIVE
HCT VFR BLD AUTO: 39.8 % (ref 35–47)
HDLC SERPL-MCNC: 31 MG/DL
HGB BLD-MCNC: 13 G/DL (ref 11.7–15.7)
HGB UR QL STRIP: NEGATIVE
HIV 1+2 AB+HIV1 P24 AG SERPL QL IA: NONREACTIVE
KETONES UR STRIP-MCNC: NEGATIVE MG/DL
LDLC SERPL CALC-MCNC: 70 MG/DL
LEUKOCYTE ESTERASE UR QL STRIP: NEGATIVE
LYMPHOCYTES # BLD AUTO: 2 10E9/L (ref 0.8–5.3)
LYMPHOCYTES NFR BLD AUTO: 25.3 %
MCH RBC QN AUTO: 26.7 PG (ref 26.5–33)
MCHC RBC AUTO-ENTMCNC: 32.7 G/DL (ref 31.5–36.5)
MCV RBC AUTO: 82 FL (ref 78–100)
MONOCYTES # BLD AUTO: 0.6 10E9/L (ref 0–1.3)
MONOCYTES NFR BLD AUTO: 7.7 %
NEUTROPHILS # BLD AUTO: 5.1 10E9/L (ref 1.6–8.3)
NEUTROPHILS NFR BLD AUTO: 64.5 %
NITRATE UR QL: NEGATIVE
NONHDLC SERPL-MCNC: 75 MG/DL
PH UR STRIP: 5.5 PH (ref 5–7)
PLATELET # BLD AUTO: 227 10E9/L (ref 150–450)
POTASSIUM SERPL-SCNC: 3.9 MMOL/L (ref 3.4–5.3)
PROT SERPL-MCNC: 8.1 G/DL (ref 6.8–8.8)
RBC # BLD AUTO: 4.87 10E12/L (ref 3.8–5.2)
SODIUM SERPL-SCNC: 137 MMOL/L (ref 133–144)
SOURCE: NORMAL
SP GR UR STRIP: 1.02 (ref 1–1.03)
SPECIMEN SOURCE: NORMAL
TRIGL SERPL-MCNC: 25 MG/DL
TSH SERPL DL<=0.005 MIU/L-ACNC: 0.98 MU/L (ref 0.4–4)
UROBILINOGEN UR STRIP-ACNC: 0.2 EU/DL (ref 0.2–1)
WBC # BLD AUTO: 7.9 10E9/L (ref 4–11)
WET PREP SPEC: NORMAL

## 2020-11-12 PROCEDURE — 99000 SPECIMEN HANDLING OFFICE-LAB: CPT | Performed by: FAMILY MEDICINE

## 2020-11-12 PROCEDURE — 86706 HEP B SURFACE ANTIBODY: CPT | Performed by: FAMILY MEDICINE

## 2020-11-12 PROCEDURE — 80050 GENERAL HEALTH PANEL: CPT | Performed by: FAMILY MEDICINE

## 2020-11-12 PROCEDURE — 80061 LIPID PANEL: CPT | Performed by: FAMILY MEDICINE

## 2020-11-12 PROCEDURE — 99213 OFFICE O/P EST LOW 20 MIN: CPT | Mod: 25 | Performed by: FAMILY MEDICINE

## 2020-11-12 PROCEDURE — 86803 HEPATITIS C AB TEST: CPT | Performed by: FAMILY MEDICINE

## 2020-11-12 PROCEDURE — 87340 HEPATITIS B SURFACE AG IA: CPT | Performed by: FAMILY MEDICINE

## 2020-11-12 PROCEDURE — 36415 COLL VENOUS BLD VENIPUNCTURE: CPT | Performed by: FAMILY MEDICINE

## 2020-11-12 PROCEDURE — 99395 PREV VISIT EST AGE 18-39: CPT | Performed by: FAMILY MEDICINE

## 2020-11-12 PROCEDURE — 87389 HIV-1 AG W/HIV-1&-2 AB AG IA: CPT | Performed by: FAMILY MEDICINE

## 2020-11-12 PROCEDURE — 87210 SMEAR WET MOUNT SALINE/INK: CPT | Performed by: FAMILY MEDICINE

## 2020-11-12 PROCEDURE — 87591 N.GONORRHOEAE DNA AMP PROB: CPT | Performed by: FAMILY MEDICINE

## 2020-11-12 PROCEDURE — 87491 CHLMYD TRACH DNA AMP PROBE: CPT | Performed by: FAMILY MEDICINE

## 2020-11-12 PROCEDURE — 81003 URINALYSIS AUTO W/O SCOPE: CPT | Performed by: FAMILY MEDICINE

## 2020-11-12 PROCEDURE — 86780 TREPONEMA PALLIDUM: CPT | Mod: 90 | Performed by: FAMILY MEDICINE

## 2020-11-12 SDOH — HEALTH STABILITY: MENTAL HEALTH: HOW MANY STANDARD DRINKS CONTAINING ALCOHOL DO YOU HAVE ON A TYPICAL DAY?: 1 OR 2

## 2020-11-12 SDOH — HEALTH STABILITY: MENTAL HEALTH: HOW OFTEN DO YOU HAVE 6 OR MORE DRINKS ON ONE OCCASION?: NOT ASKED

## 2020-11-12 SDOH — HEALTH STABILITY: MENTAL HEALTH: HOW OFTEN DO YOU HAVE A DRINK CONTAINING ALCOHOL?: MONTHLY OR LESS

## 2020-11-12 ASSESSMENT — ENCOUNTER SYMPTOMS
HEMATOCHEZIA: 0
HEARTBURN: 0
DYSURIA: 0
JOINT SWELLING: 0
PARESTHESIAS: 0
HEADACHES: 1
SORE THROAT: 0
FEVER: 0
ARTHRALGIAS: 0
DIARRHEA: 0
SHORTNESS OF BREATH: 0
ABDOMINAL PAIN: 1
FREQUENCY: 0
CONSTIPATION: 0
COUGH: 0
EYE PAIN: 0
NAUSEA: 0
MYALGIAS: 0
NERVOUS/ANXIOUS: 1
CHILLS: 0
DIZZINESS: 0
BREAST MASS: 0
WEAKNESS: 0
PALPITATIONS: 0
HEMATURIA: 0

## 2020-11-12 ASSESSMENT — ANXIETY QUESTIONNAIRES
6. BECOMING EASILY ANNOYED OR IRRITABLE: NOT AT ALL
7. FEELING AFRAID AS IF SOMETHING AWFUL MIGHT HAPPEN: SEVERAL DAYS
GAD7 TOTAL SCORE: 3
2. NOT BEING ABLE TO STOP OR CONTROL WORRYING: NOT AT ALL
GAD7 TOTAL SCORE: 3
GAD7 TOTAL SCORE: 3
5. BEING SO RESTLESS THAT IT IS HARD TO SIT STILL: NOT AT ALL
3. WORRYING TOO MUCH ABOUT DIFFERENT THINGS: SEVERAL DAYS
4. TROUBLE RELAXING: NOT AT ALL
7. FEELING AFRAID AS IF SOMETHING AWFUL MIGHT HAPPEN: SEVERAL DAYS
1. FEELING NERVOUS, ANXIOUS, OR ON EDGE: SEVERAL DAYS

## 2020-11-12 ASSESSMENT — PATIENT HEALTH QUESTIONNAIRE - PHQ9
SUM OF ALL RESPONSES TO PHQ QUESTIONS 1-9: 4
SUM OF ALL RESPONSES TO PHQ QUESTIONS 1-9: 4
10. IF YOU CHECKED OFF ANY PROBLEMS, HOW DIFFICULT HAVE THESE PROBLEMS MADE IT FOR YOU TO DO YOUR WORK, TAKE CARE OF THINGS AT HOME, OR GET ALONG WITH OTHER PEOPLE: NOT DIFFICULT AT ALL

## 2020-11-12 ASSESSMENT — MIFFLIN-ST. JEOR: SCORE: 1942.89

## 2020-11-12 NOTE — PROGRESS NOTES
SUBJECTIVE:   CC: Yael Johnson is an 30 year old woman who presents for preventive health visit.     Patient has been advised of split billing requirements and indicates understanding: Yes  Healthy Habits:     Getting at least 3 servings of Calcium per day:  NO    Bi-annual eye exam:  Yes    Dental care twice a year:  NO    Sleep apnea or symptoms of sleep apnea:  Daytime drowsiness and Excessive snoring    Diet:  Regular (no restrictions)    Frequency of exercise:  None    Taking medications regularly:  Yes    Medication side effects:  Not applicable    PHQ-2 Total Score: 0    Additional concerns today:  Yes    Here for a preventive physical & address additional concerns  No breast issues  Pap due 2022  Flu shot utd  Will do labs today    Obesity/ FH DM/ hx of leg edema : Tried phentermine, lost some weight not sustained it. Lost 10 to 15 lbs, was fasting at [employer] and eating vegan one  month & that might have also helped a lot to do with weight loss. Has kept some of it off. Stopped med in sept. Planning now to get a gastric sleeve. Works for [employer] & trying to get it done at [facility]  With Dr Jon, Has an Initial visit on dec 4th. Stopped working out. School got busy & also intentionally slowed down to meet the criteria for weight loss surgery.     MDD/ Anxiety: : Relationship ended. Alhaji in hospital with schizophrenia. Working from home since start of pandemic. 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 Courses are difficult. Does not have transportation. Anxious but wants to wait to see therapist for individual therapy to be set up as part of gastric sleeve process  Currently in family therapy with mom for family dynamic issue. Feels safe    Asthma stable. Using Advair not every day. Uses 3 to 4 times a week. Not need even if exercised but lately using more freq. No fever or chills, occasional  Headache not new, no dizziness, no double or blurry vision, no facial pain, earache,  sore throat, runny nose, post nasal drip, no trouble hearing, smelling, tasting or swallowing, no cough, trouble breathing or palpitations,     Chest pain left side of chest off and on, sharp twinge lasting seconds at most, none currently, sharpness left breast area no breast issues.  When working out or dancing does not have any chest pain.     Leg fatigue & edema improved. Willing to do venous incompetent studies.     Lower abdominal / Pelvic pain: crampy not around period, no bleeding or spotting  No problems urinating. Feels warmer in vaginal area. Thought uti, but checked recently & was negative. BM Going regular not straining. Has had No heart burn, reflux, nausea or vomiting or diarrhea or constipation, no blood in stools or black stools, no weight loss or night sweats. No dysuria, hematuria, frequency, urgency, hesitancy, incontinence, No pelvic complaints. No leg swelling or joint pain. No rash.    Headaches not new or debilitating.     PHQ-9 (Pfizer) 11/12/2020   1.  Little interest or pleasure in doing things 0   2.  Feeling down, depressed, or hopeless 0   3.  Trouble falling or staying asleep, or sleeping too much 1   4.  Feeling tired or having little energy 1   5.  Poor appetite or overeating 2   6.  Feeling bad about yourself 0   7.  Trouble concentrating 0   8.  Moving slowly or restless 0   9.  Suicidal or self-harm thoughts 0   PHQ-9 Total Score 4   Difficulty at work, home, or with people    1.  Little interest or pleasure in doing things Not at all   2.  Feeling down, depressed, or hopeless Not at all   3.  Trouble falling or staying asleep, or sleeping too much Several days   4.  Feeling tired or having little energy Several days   5.  Poor appetite or overeating More than half the days   6.  Feeling bad about yourself Not at all   7.  Trouble concentrating Not at all   8.  Moving slowly or restless Not at all   9.  Suicidal or self-harm thoughts Not at all   PHQ[facility] TOTAL SCORE----->  4 (Minimal depression)   Difficulty at work, home, or with people Not difficult at all   JAYE-7   [employer], 2002; Used with Permission) 11/12/2020   1. Feeling nervous, anxious, or on edge Several days   2. Not being able to stop or control worrying Not at all   3. Worrying too much about different things Several days   4. Trouble relaxing Not at all   5. Being so restless that it is hard to sit still Not at all   6. Becoming easily annoyed or irritable Not at all   7. Feeling afraid, as if something awful might happen Several days   JAYE 7 TOTAL SCORE 3 (minimal anxiety)   1. Feeling nervous, anxious, or on edge 1   2. Not being able to stop or control worrying 0   3. Worrying too much about different things 1   4. Trouble relaxing 0   5. Being so restless that it is hard to sit still 0   6. Becoming easily annoyed or irritable 0   7. Feeling afraid, as if something awful might happen 1   JAYE-7 Total Score 3   If you checked any problems, how difficult have they made it for you to do your work, take care of things at home, or get along with other people?    Asthma Control Test    ACT TOTAL SCORE    ACT Total Score (Goal Greater than or Equal to 20)      BACKGROUND  Hx of morbid obesity, BMI > 44, enlarged tonsils, moderate persistent asthma on albuterol inhaler & neb prn & Advair 100/50 bid, allergic to cats & dogs, FH of diabetes, prior left breast biopsy that was benign & still had  a lump in 2017, with hx of irregular menstrual cycle no longer on nuvaring, AUB on Micronor with mild improvement, u/S by gn outside [facility] showed a endometrial polyp, low ferritin, palpitations, hx of JAYE, MDD, hx of SI, hx of a lot of cancellations & no shows,   Previously under care of PCP Wiley, Seen first time by this writer on 5/31/18 for vaginal odor & treated for BV with Metrogel. Std screen was negative. Had left knee pain, exam was benign & advised quad strengthening , weight loss & referred to ENT for enlarged tonsils.  Cbc, CMP, lipids, TSH, hep B,C, HIV , syphilis & GC were negative. Seen 19 by Beto for sore throat , no infection seen. GC oral was negative. Was given nuvaring,  Advair dose increased but not started, remains stable on prior lower dosing.    Seen 3/8/19 for palpitations, aware of it since the middle of 2018. Symptoms started the day her brother in law  of a heart attack  & thought maybe was anxiety initially then worried about dying from a heart attack due to obesity. When first started palpitations it would last a couple days and then over time few hours, but when seen reported it didn't t occur as frequently. Would usually note when she was lying down at night. Did not feel while at work or while walking to the bus unless had to run. Usually when occurred at night would say a prayer and go to bed and get up next am feeling fine. With the palpitations she had no associated symptoms. . She also noted her Mom was concerned about her having bipolar disorder. She & her friends did not think she had it but her Mom was concerned about bipolar and adult ADHD as mom had it. Noted periods of increased sexuality but no periods of getting by with no to less sleep & denied any visual or auditory hallucinations or psychosis.  Asthma was stable on Advair 100/50 & and albuterol prn. Noted did use her dads Symbicort inhaler when didn't have insurance & only used Albuterol neb if running out of meds. Exam was benign. Had no red flag symptom or signs. EKG showed NSR. Suspected anxiety and weight playing a role. Asthma was well controlled & continued on lower dosing of Advair.To work on lifestyle. Referred to psychology for diagnostic testing, counseling and then psyche if needed meds regarding concern about family hx of bipolar & ADHD. Referred to cardiology, & Holter ordered & referred to bariatrics. To consider seeing sleep in future given obesity & possible hx of snoring. Was to see gyn for irregular menstrual  cycle and to do pap with them.   CMP, TSH, iron, HCG, HB A1c, CBC, was normal. Ferritin was lower end of normal and Holter was normal.  Seen by gyn 3/2019 for irregular menses, recurrent BV, Cx was normal, pap not done & progesterone was normal. No showed to follow up & cancelled apt 4/4/, 4/11, 4/17 & 4/18. Seen by cardiology 4/17 & reassured heart was normal & to work on loosing weight. Seen in the ER 8/10 after drinking alcohol with Suicidal ideation but evaluated and discharged home. Seen then by [facility] g[facility] 8/30 for AUB & given progesterone only pill & pelvic u/S ordered , [facility] and Zeeshan signed to get report. Pelvic u/S 9/19 showed a 1.2 cm upper uterine segment polyp or submucosal fibroid & a 3.3cm simple right ovarian cyst. Seen by Gyn and PAP and GC obtained was normal. Seen by gyn 10/3/19 & planned for hysteroscopy polypectomy for AUB & u/S findings. Seen by [facility] on 10/25/19 for vaginal odor but wet prep was normal. Cancelled and rescheduled from 11/29/19.  Seen 12/6/19 for atypical chest pain, right leg fatigue and shin bruise and left kneecap popping out.  Examination was benign. Chest pain did not sound cardiac. Extensive work up this past year with EKG, Holter and cardiology ruled out cardiac cause. Given weight and use of  birth control though  did check basic tests. Suspected had costochondritis related to weight and sitting slouched and stress. Advised that physical therapy and weight loss could help. Right leg fatigue did not sound like a DVT and left knee symptoms were suggestive of patella femoral syndrome/ subluxation. Asthma was reported well controlled but wheezing was heard and reported had not taken her meds that am. Gave herself an inhaler while in the room. Asthma action plan given. Sylvie/ MDD noted  stable, & declined counseling or meds at the time.  Was advised to avoid alcohol.   To consider seeing bariatrics and sleep to evaluate and treat for   possible sleep apnea and help loose weight in the future. These referrals were given earlier.  CBC, CMP, ferritin, d-dimer and EKG were normal.  Doppler was negative and just showed the old bruise right anterior shin.  Did see sports medicine on 12/19 and diagnosed with subluxation of the left patella and referred to physical therapy which she says she has started doing.  No showed or canceled several times and finally rescheduled.     Seen 1/10/2020 for preop for endometrial polyp removal.  Prior to procedure.  Did on care on 4/19 for viral symptoms.     TE done on 6/2020 desiring phentermine for weight loss & referred to bariatrics. 6/12 UA, GC HCG, TSH, cbc  in an outside clinic was normal. Seen by endo 6/19/20 & started on phentermine. Called in June as well about worsening leg edema, advised low salt, increase exercise & check with endo about meds, advised to come in for an apt to discuss more, to check for venous incompetence, felt diuretic not indicated.  Seen in UC 8/23/20 for chest tightness, nausea & vomiting. EKG was normal & given Zofran. In UC at [facility] 9/4 for vaginal symptoms/ uti symptoms since broke up desiring std testing. Gc, ua trich was negative. Negative for Hep C, B, syphilis & HIV. Apt 9/24 cancelled due to lack of transportation.  30 min late to 10/23 apt so rescheduled.    Today's PHQ-2 Score:   PHQ-2 ( 1999 Pfizer) 11/12/2020   Q1: Little interest or pleasure in doing things 0   Q2: Feeling down, depressed or hopeless 0   PHQ-2 Score 0   Q1: Little interest or pleasure in doing things Not at all   Q2: Feeling down, depressed or hopeless Not at all   PHQ-2 Score 0       Abuse: Current or Past (Physical, Sexual or Emotional) - No  Do you feel safe in your environment? Yes    Social History     Tobacco Use     Smoking status: Never Smoker     Smokeless tobacco: Never Used   Substance Use Topics     Alcohol use: Not Currently     Frequency: Never     Comment: [facility]     Alcohol Use 11/12/2020    Prescreen: >3 drinks/day or >7 drinks/week? No   Prescreen: >3 drinks/day or >7 drinks/week? -     Reviewed orders with patient.  Reviewed health maintenance and updated orders accordingly - Yes  Lab work is in process  Labs reviewed in EPIC  BP Readings from Last 3 Encounters:   11/12/20 123/78   08/23/20 132/86   01/10/20 122/72    Wt Readings from Last 3 Encounters:   11/12/20 121.2 kg (267 lb 3.2 oz)   08/23/20 115.7 kg (255 lb)   06/19/20 122 kg (269 lb)                  Patient Active Problem List   Diagnosis     Family history of diabetes mellitus     Enlarged tonsils     Moderate persistent asthma without complication     Morbid obesity (H)     Irregular menstrual cycle     JAYE (generalized anxiety disorder)     Major depressive disorder, remission status unspecified, unspecified whether recurrent     Subluxation of left patella, sequela     Leg fatigue     History of edema     Past Surgical History:   Procedure Laterality Date     US BREAST BIOPSY LT  2017    benign, still has a lump       Social History     Tobacco Use     Smoking status: Never Smoker     Smokeless tobacco: Never Used   Substance Use Topics     Alcohol use: Yes     Frequency: Monthly or less     Drinks per session: 1 or 2     Comment: [facility]     Family History   Problem Relation Age of Onset     Hypertension Mother      Diabetes Mother         type 2     Arthritis Mother      Bipolar Disorder Mother         per mom     Diabetes Maternal Grandmother         type 2     Cardiovascular Paternal Grandfather         Lung         Current Outpatient Medications   Medication Sig Dispense Refill     ADVAIR DISKUS 100-50 MCG/DOSE inhaler INHALE 1 PUFF INTO THE LUNGS EVERY 12 HOURS 60 Inhaler 1     albuterol (PROAIR HFA/PROVENTIL HFA/VENTOLIN HFA) 108 (90 Base) MCG/ACT inhaler Inhale 2 puffs into the lungs every 6 hours as needed for shortness of breath / dyspnea or wheezing 3 Inhaler 1     norethindrone (MICRONOR) 0.35 MG tablet Take 0.35 mg by mouth  daily  4     albuterol (2.5 MG/3ML) 0.083% nebulizer solution Take 1 vial (2.5 mg) by nebulization every 6 hours as needed for shortness of breath / dyspnea (Patient not taking: Reported on 2020) 1 Box 11     Allergies   Allergen Reactions     Cats      Dogs      Recent Labs   Lab Test 20  1109 19  1419 19  1232 18  1458 16  1606 16  1606   A1C  --   --  5.3 5.5  --   --    LDL 70  --   --  52  --  70   HDL 31*  --   --  29*  --  30*   TRIG 25  --   --  73  --  69   ALT 21 19 18 22  --  20   CR 0.73 0.90 0.76 0.73  --  1.02   GFRESTIMATED >90 88 >90 >90  --  67   GFRESTBLACK >90 >90 >90 >90  --  81   POTASSIUM 3.9 3.7 4.0 3.5  --  3.8   TSH 0.98  --  0.58 0.55   < > 0.82    < > = values in this interval not displayed.        Mammogram Screening: Patient over age 50, mutual decision to screen reflected in health maintenance.    Pertinent mammograms are reviewed under the imaging tab.  History of abnormal Pap smear:   NO - age 21-29 PAP every 3 years recommended  Last 3 Pap Results:   PAP (no units)   Date Value   10/07/2016 NIL     Last 3 Pap and HPV Results:   PAP / HPV 10/7/2016   PAP NIL     PAP / HPV 10/7/2016   PAP NIL     Reviewed and updated as needed this visit by clinical staff  Tobacco  Allergies  Meds   Med Hx  Surg Hx  Fam Hx  Soc Hx        Reviewed and updated as needed this visit by Provider                Past Medical History:   Diagnosis Date     Abnormal uterine bleeding (AUB) 2019     Asthma      Enlarged tonsils      Low ferritin 2019     Palpitations 2019     Uterine polyp 2019      Past Surgical History:   Procedure Laterality Date     US BREAST BIOPSY LT  2017    benign, still has a lump     OB History    Para Term  AB Living   0 0 0 0 0 0   SAB TAB Ectopic Multiple Live Births   0 0 0 0 0       Review of Systems   Constitutional: Negative for chills and fever.   HENT: Negative for congestion, ear pain, hearing  "loss and sore throat.    Eyes: Negative for pain and visual disturbance.   Respiratory: Negative for cough and shortness of breath.    Cardiovascular: Positive for chest pain. Negative for palpitations and peripheral edema.   Gastrointestinal: Positive for abdominal pain. Negative for constipation, diarrhea, heartburn, hematochezia and nausea.   Breasts:  Negative for tenderness, breast mass and discharge.   Genitourinary: Positive for pelvic pain. Negative for dysuria, frequency, genital sores, hematuria, urgency, vaginal bleeding and vaginal discharge.   Musculoskeletal: Negative for arthralgias, joint swelling and myalgias.   Skin: Negative for rash.   Neurological: Positive for headaches. Negative for dizziness, weakness and paresthesias.   Psychiatric/Behavioral: Negative for mood changes. The patient is nervous/anxious.       OBJECTIVE:   Temp 98.4  F (36.9  C) (Oral)   Resp 16   Ht 1.651 m (5' 5\")   Wt 121.2 kg (267 lb 3.2 oz)   BMI 44.46 kg/m    Physical Exam  GENERAL: healthy, alert, no distress and obese  EYES: Eyes grossly normal to inspection, PERRL and conjunctivae and sclerae normal  HENT: ear canals and TM's normal, nose and mouth without ulcers or lesions  NECK: no adenopathy, no asymmetry, masses, or scars and thyroid normal to palpation  RESP: lungs clear to auscultation - no rales, rhonchi or wheezes  BREAST: normal without masses, tenderness or nipple discharge and no palpable axillary masses or adenopathy  CV: regular rate and rhythm, normal S1 S2, no S3 or S4, no murmur, click or rub, no peripheral edema and peripheral pulses strong  ABDOMEN: soft, non tender, no hepatosplenomegaly, no masses and bowel sounds normal  MS: no gross musculoskeletal defects noted, no edema  SKIN: no suspicious lesions or rashes  NEURO: Normal strength and tone, mentation intact and speech normal  PSYCH: mentation appears normal, affect normal/bright    Diagnostic Test Results:  Labs reviewed in Epic  Results " for orders placed or performed in visit on 11/12/20   CBC with platelets differential     Status: None   Result Value Ref Range    WBC 7.9 4.0 - 11.0 10e9/L    RBC Count 4.87 3.8 - 5.2 10e12/L    Hemoglobin 13.0 11.7 - 15.7 g/dL    Hematocrit 39.8 35.0 - 47.0 %    MCV 82 78 - 100 fl    MCH 26.7 26.5 - 33.0 pg    MCHC 32.7 31.5 - 36.5 g/dL    RDW 14.4 10.0 - 15.0 %    Platelet Count 227 150 - 450 10e9/L    Diff Method Automated Method     % Neutrophils 64.5 %    % Lymphocytes 25.3 %    % Monocytes 7.7 %    % Eosinophils 2.4 %    % Basophils 0.1 %    Absolute Neutrophil 5.1 1.6 - 8.3 10e9/L    Absolute Lymphocytes 2.0 0.8 - 5.3 10e9/L    Absolute Monocytes 0.6 0.0 - 1.3 10e9/L    Absolute Eosinophils 0.2 0.0 - 0.7 10e9/L    Absolute Basophils 0.0 0.0 - 0.2 10e9/L   Comprehensive metabolic panel     Status: None   Result Value Ref Range    Sodium 137 133 - 144 mmol/L    Potassium 3.9 3.4 - 5.3 mmol/L    Chloride 108 94 - 109 mmol/L    Carbon Dioxide 23 20 - 32 mmol/L    Anion Gap 6 3 - 14 mmol/L    Glucose 89 70 - 99 mg/dL    Urea Nitrogen 14 7 - 30 mg/dL    Creatinine 0.73 0.52 - 1.04 mg/dL    GFR Estimate >90 >60 mL/min/[1.73_m2]    GFR Estimate If Black >90 >60 mL/min/[1.73_m2]    Calcium 8.9 8.5 - 10.1 mg/dL    Bilirubin Total 0.4 0.2 - 1.3 mg/dL    Albumin 3.8 3.4 - 5.0 g/dL    Protein Total 8.1 6.8 - 8.8 g/dL    Alkaline Phosphatase 65 40 - 150 U/L    ALT 21 0 - 50 U/L    AST 13 0 - 45 U/L   Lipid panel reflex to direct LDL Fasting     Status: Abnormal   Result Value Ref Range    Cholesterol 106 <200 mg/dL    Triglycerides 25 <150 mg/dL    HDL Cholesterol 31 (L) >49 mg/dL    LDL Cholesterol Calculated 70 <100 mg/dL    Non HDL Cholesterol 75 <130 mg/dL   TSH with free T4 reflex     Status: None   Result Value Ref Range    TSH 0.98 0.40 - 4.00 mU/L   UA reflex to Microscopic and Culture     Status: None    Specimen: Midstream Urine   Result Value Ref Range    Color Urine Yellow     Appearance Urine Clear      Glucose Urine Negative NEG^Negative mg/dL    Bilirubin Urine Negative NEG^Negative    Ketones Urine Negative NEG^Negative mg/dL    Specific Gravity Urine 1.025 1.003 - 1.035    Blood Urine Negative NEG^Negative    pH Urine 5.5 5.0 - 7.0 pH    Protein Albumin Urine Negative NEG^Negative mg/dL    Urobilinogen Urine 0.2 0.2 - 1.0 EU/dL    Nitrite Urine Negative NEG^Negative    Leukocyte Esterase Urine Negative NEG^Negative    Source Midstream Urine    HIV Antigen Antibody Combo     Status: None   Result Value Ref Range    HIV Antigen Antibody Combo Nonreactive NR^Nonreactive       Treponema Abs w Reflex to RPR and Titer     Status: None   Result Value Ref Range    Treponema Antibodies Nonreactive NR^Nonreactive   Hepatitis B Surface Antibody     Status: Abnormal   Result Value Ref Range    Hepatitis B Surface Antibody >1,000.00 (H) <8.00 m[IU]/mL   Hepatitis B surface antigen     Status: None   Result Value Ref Range    Hep B Surface Agn Nonreactive NR^Nonreactive   Hepatitis C Screen Reflex to HCV RNA Quant and Genotype     Status: None   Result Value Ref Range    Hepatitis C Antibody Nonreactive NR^Nonreactive   NEISSERIA GONORRHOEA PCR     Status: None    Specimen: Genital, vaginal   Result Value Ref Range    Specimen Descrip Urine     N Gonorrhea PCR Negative NEG^Negative   CHLAMYDIA TRACHOMATIS PCR     Status: None    Specimen: Genital, vaginal   Result Value Ref Range    Specimen Description Urine     Chlamydia Trachomatis PCR Negative NEG^Negative   Wet prep     Status: None    Specimen: Vagina   Result Value Ref Range    Specimen Description Vagina     Wet Prep WBC'S seen  Few       Wet Prep No Trichomonas seen     Wet Prep No clue cells seen     Wet Prep No yeast seen        ASSESSMENT/PLAN:       ICD-10-CM    1. Routine history and physical examination of adult  Z00.00    2. Morbid obesity (H)  E66.01 CBC with platelets differential     Comprehensive metabolic panel     Lipid panel reflex to direct LDL  Fasting     TSH with free T4 reflex   3. Major depressive disorder, remission status unspecified, unspecified whether recurrent  F32.9 CBC with platelets differential     TSH with free T4 reflex   4. JAYE (generalized anxiety disorder)  F41.1 CBC with platelets differential     TSH with free T4 reflex   5. Moderate persistent asthma without complication  J45.40 CBC with platelets differential     Asthma Action Plan (AAP)   6. Atypical chest pain  R07.89    7. Family history of diabetes mellitus  Z83.3 CBC with platelets differential   8. Leg fatigue  R29.898 CBC with platelets differential     Comprehensive metabolic panel     US Venous Competency Bilateral   9. History of edema  Z87.898 CBC with platelets differential     Comprehensive metabolic panel     US Venous Competency Bilateral   10. Lower abdominal pain  R10.30 CBC with platelets differential     Comprehensive metabolic panel     UA reflex to Microscopic and Culture   11. Pelvic pain in female  R10.2 CBC with platelets differential     Comprehensive metabolic panel     UA reflex to Microscopic and Culture     NEISSERIA GONORRHOEA PCR     CHLAMYDIA TRACHOMATIS PCR     Wet prep     HIV Antigen Antibody Combo     Treponema Abs w Reflex to RPR and Titer     Hepatitis B Surface Antibody     Hepatitis B surface antigen     Hepatitis C Screen Reflex to HCV RNA Quant and Genotype   12. Episodic tension-type headache, not intractable  G44.219    13. Screen for STD (sexually transmitted disease)  Z11.3 NEISSERIA GONORRHOEA PCR     CHLAMYDIA TRACHOMATIS PCR     Wet prep     HIV Antigen Antibody Combo     Treponema Abs w Reflex to RPR and Titer     Hepatitis B Surface Antibody     Hepatitis B surface antigen     Hepatitis C Screen Reflex to HCV RNA Quant and Genotype     Physical  Breast exam done. Self check regularly  Pap due 2022  Continue care bariatrics for weight loss. Considering sleeve surgery  healthy eating   Labs today  Asthma : meds & asthma action plan  "reviewed. Has refills  Birth control by gyn  Follow pelvic pain with gyn as needed  Consider sleep eval maybe after weight loss surgery  Follow up ortho as needed for knee pain  Diet, exercise will help  Vein ultrasound to check for leaky veins given hx of edema  Elevate, avoid salt, consider compressions socks  Further recommendations after this  Chest pains sound muscular, monitor, can do u/s left breast if persists. Asymptomatic currently & Wells criteria low for PE.  Stay active, monitor with cycle  Consider personal therapist, currently in family therapist  Labs today for pelvic pain  Return in 1 yr for a physical & sooner for any new or persistent concerns    Patient has been advised of split billing requirements and indicates understanding: Yes  COUNSELING:  Reviewed preventive health counseling, as reflected in patient instructions       Regular exercise       Healthy diet/nutrition       Vision screening       Immunizations       Alcohol Use       Contraception       Safe sex practices/STD prevention    Estimated body mass index is 44.46 kg/m  as calculated from the following:    Height as of this encounter: 1.651 m (5' 5\").    Weight as of this encounter: 121.2 kg (267 lb 3.2 oz).    Weight management plan: Patient referred to endocrine and/or weight management specialty Discussed healthy diet and exercise guidelines    She reports that she has never smoked. She has never used smokeless tobacco.      Counseling Resources:  ATP IV Guidelines  Pooled Cohorts Equation Calculator  Breast Cancer Risk Calculator  BRCA-Related Cancer Risk Assessment: FHS-7 Tool  FRAX Risk Assessment  ICSI Preventive Guidelines  Dietary Guidelines for Americans, 2010  USDA's MyPlate  ASA Prophylaxis  Lung CA Screening    Angela Pelayo MD  [facility]  Answers for HPI/ROS submitted by the patient on 11/12/2020   Annual Exam:  If you checked off any problems, how difficult have these problems made it for " you to do your work, take care of things at home, or get along with other people?: Not difficult at all  PHQ9 TOTAL SCORE: 4  JAYE 7 TOTAL SCORE: 3

## 2020-11-12 NOTE — PATIENT INSTRUCTIONS
Physical  Breast exam done. Self check regularily  Pap due 2022  Continue care bariatrics for weight loss. Considering sleeve surgery  healthy eating   Labs today  Asthma : meds & asthma action plan reviewed. Has refills  Birth control by gyn  Follow pelvic pain with gyn as needed  Consider sleep eval maybe after weight loss surgery  Follow up ortho as needed for knee pain  Diet, exercise will help  Vein ultrasound to check for leaky veins given hx of edema  Elevate, avoid salt, consider compressions socks  Further reccomedations after this  Chest pains sound muscular, monitor, can do u/s left breast if persists.   Stay active, monitor with cycle  Consider personal therapist  Labs today for pelvic pain  Return in 1 yr for a physical & sooner for any new or persistent concerns    Preventive Health Recommendations  Female Ages 26 - 39  Yearly exam:   See your health care provider every year in order to    Review health changes.     Discuss preventive care.      Review your medicines if you your doctor has prescribed any.    Until age 30: Get a Pap test every three years (more often if you have had an abnormal result).    After age 30: Talk to your doctor about whether you should have a Pap test every 3 years or have a Pap test with HPV screening every 5 years.   You do not need a Pap test if your uterus was removed (hysterectomy) and you have not had cancer.  You should be tested each year for STDs (sexually transmitted diseases), if you're at risk.   Talk to your provider about how often to have your cholesterol checked.  If you are at risk for diabetes, you should have a diabetes test (fasting glucose).  Shots: Get a flu shot each year. Get a tetanus shot every 10 years.   Nutrition:     Eat at least 5 servings of fruits and vegetables each day.    Eat whole-grain bread, whole-wheat pasta and brown rice instead of white grains and rice.    Get adequate Calcium and Vitamin D.     Lifestyle    Exercise at least 150  minutes a week (30 minutes a day, 5 days of the week). This will help you control your weight and prevent disease.    Limit alcohol to one drink per day.    No smoking.     Wear sunscreen to prevent skin cancer.    See your dentist every six months for an exam and cleaning.

## 2020-11-12 NOTE — RESULT ENCOUNTER NOTE
Lyle AnsariCorbin Johnson,  Some of your results came back and are within acceptable limits. -Normal red blood cell (hgb) levels, normal white blood cell count and normal platelet levels.  -Urine is normal.  -No signs of bacteria or yeast vaginal infections.. If you have any further concerns please do not hesitate to contact us by message, phone or making an appointment.  Have a good day   Dr Pierce XAVIER

## 2020-11-13 ENCOUNTER — MYC MEDICAL ADVICE (OUTPATIENT)
Dept: FAMILY MEDICINE | Facility: CLINIC | Age: 30
End: 2020-11-13

## 2020-11-13 LAB
C TRACH DNA SPEC QL NAA+PROBE: NEGATIVE
HCV AB SERPL QL IA: NONREACTIVE
N GONORRHOEA DNA SPEC QL NAA+PROBE: NEGATIVE
SPECIMEN SOURCE: NORMAL
SPECIMEN SOURCE: NORMAL
T PALLIDUM AB SER QL: NONREACTIVE

## 2020-11-13 ASSESSMENT — PATIENT HEALTH QUESTIONNAIRE - PHQ9: SUM OF ALL RESPONSES TO PHQ QUESTIONS 1-9: 4

## 2020-11-13 ASSESSMENT — ASTHMA QUESTIONNAIRES: ACT_TOTALSCORE: 22

## 2020-11-13 ASSESSMENT — ANXIETY QUESTIONNAIRES: GAD7 TOTAL SCORE: 3

## 2020-11-13 NOTE — RESULT ENCOUNTER NOTE
Lyle Ansari. Johnson,  Your results came back and are within acceptable limits. -Hepatitis C antibody screen test shows no signs of a previous hepatitis C infection.. If you have any further concerns please do not hesitate to contact us by message, phone or making an appointment.  Have a good day   Dr Pierce XAVIER

## 2020-11-13 NOTE — RESULT ENCOUNTER NOTE
Lyle Ms. Johnson,  Your results came back and are within acceptable limits.   negative for hep B infection. Test just shows you are immune to hepatitis b likely from prior vaccination which is good news  Negative for syphilis  Negative for HIV   -HDL(good) cholesterol level is low which can increase your heart disease risk.  A diet high in fat and simple carbohydrates, genetics and being overweight can contribute to this. Your LDL(bad) cholesterol and trigylceride levels are normal.  ADVISE: exercising 150 minutes of aerobic exercise per week (30 minutes 5 days per week or 50 minutes 3 days per week are options), and omega-3 fatty acids (fish oil) [id number] mg daily are helpful to improve this.  In 12 months, you should recheck your fasting cholesterol panel by scheduling a lab-only appointment.  -Liver and gallbladder tests are normal (ALT,AST, Alk phos, bilirubin), kidney function is normal (Cr, GFR), sodium is normal, potassium is normal, calcium is normal, glucose is normal.  -TSH (thyroid stimulating hormone) level is normal which indicates normal thyroid function.  -Chlamydia and gonnohrea tests are normal.  . If you have any further concerns please do not hesitate to contact us by message, phone or making an appointment.  Have a good day   Dr Pierce XAVIER

## 2020-11-13 NOTE — TELEPHONE ENCOUNTER
These were addressed.  Negative for hep b infection  Has immunity against hep B from prior vaccination which is good

## 2020-11-16 ENCOUNTER — HEALTH MAINTENANCE LETTER (OUTPATIENT)
Age: 30
End: 2020-11-16

## 2021-02-08 DIAGNOSIS — J45.40 MODERATE PERSISTENT ASTHMA WITHOUT COMPLICATION: ICD-10-CM

## 2021-02-08 NOTE — TELEPHONE ENCOUNTER
Prescription approved per [facility] Refill Protocol.  LANDY Gilbert, RN  [facility]      ACT Total Scores 12/6/2019 6/9/2020 11/12/2020   ACT TOTAL SCORE - - -   ASTHMA ER VISITS - - -   ASTHMA HOSPITALIZATIONS - - -   ACT TOTAL SCORE (Goal Greater than or Equal to 20) 24 22 22   In the past 12 months, how many times did you visit the emergency room for your asthma without being admitted to the hospital? 0 0 0   In the past 12 months, how many times were you hospitalized overnight because of your asthma? 0 0 0

## 2021-03-04 ENCOUNTER — ANCILLARY PROCEDURE (OUTPATIENT)
Dept: ULTRASOUND IMAGING | Facility: CLINIC | Age: 31
End: 2021-03-04
Attending: FAMILY MEDICINE
Payer: COMMERCIAL

## 2021-03-04 DIAGNOSIS — R29.898 LEG FATIGUE: ICD-10-CM

## 2021-03-04 DIAGNOSIS — Z87.898 HISTORY OF EDEMA: ICD-10-CM

## 2021-03-04 PROCEDURE — 93970 EXTREMITY STUDY: CPT | Performed by: RADIOLOGY

## 2021-03-04 NOTE — RESULT ENCOUNTER NOTE
Lyle Ansari. 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  may contribute to dependant edema & leg fatigue end of day  If having symptom can refer to vascular to evaluate further. Let me know & I can put the referral in  Weight loss, staying active & leg elevation will help   See education attached to get an idea about it [email or web address]  . If you have any further concerns please do not hesitate to contact us by message, phone or making an appointment.  Have a good day   Dr Pierce XAVIER

## 2021-05-19 PROBLEM — R29.898 LEG FATIGUE: Status: RESOLVED | Noted: 2020-10-23 | Resolved: 2021-05-19

## 2021-05-19 PROBLEM — Z87.898 HISTORY OF EDEMA: Status: RESOLVED | Noted: 2020-10-23 | Resolved: 2021-05-19

## 2021-05-19 PROBLEM — I87.2 VENOUS INCOMPETENCE: Status: ACTIVE | Noted: 2021-05-19

## 2021-06-06 ENCOUNTER — MYC MEDICAL ADVICE (OUTPATIENT)
Dept: FAMILY MEDICINE | Facility: CLINIC | Age: 31
End: 2021-06-06

## 2021-07-26 ENCOUNTER — VIRTUAL VISIT (OUTPATIENT)
Dept: FAMILY MEDICINE | Facility: CLINIC | Age: 31
End: 2021-07-26
Payer: COMMERCIAL

## 2021-07-26 DIAGNOSIS — F90.0 ATTENTION DEFICIT HYPERACTIVITY DISORDER (ADHD), PREDOMINANTLY INATTENTIVE TYPE: Primary | ICD-10-CM

## 2021-07-26 DIAGNOSIS — E66.01 MORBID OBESITY (H): ICD-10-CM

## 2021-07-26 DIAGNOSIS — J45.40 MODERATE PERSISTENT ASTHMA WITHOUT COMPLICATION: ICD-10-CM

## 2021-07-26 DIAGNOSIS — I87.2 VENOUS INCOMPETENCE: ICD-10-CM

## 2021-07-26 DIAGNOSIS — F32.9 MAJOR DEPRESSIVE DISORDER, REMISSION STATUS UNSPECIFIED, UNSPECIFIED WHETHER RECURRENT: ICD-10-CM

## 2021-07-26 DIAGNOSIS — F41.1 GAD (GENERALIZED ANXIETY DISORDER): ICD-10-CM

## 2021-07-26 PROCEDURE — 96127 BRIEF EMOTIONAL/BEHAV ASSMT: CPT | Performed by: FAMILY MEDICINE

## 2021-07-26 PROCEDURE — 99215 OFFICE O/P EST HI 40 MIN: CPT | Mod: 95 | Performed by: FAMILY MEDICINE

## 2021-07-26 ASSESSMENT — ANXIETY QUESTIONNAIRES
1. FEELING NERVOUS, ANXIOUS, OR ON EDGE: NOT AT ALL
3. WORRYING TOO MUCH ABOUT DIFFERENT THINGS: NEARLY EVERY DAY
6. BECOMING EASILY ANNOYED OR IRRITABLE: NEARLY EVERY DAY
GAD7 TOTAL SCORE: 9
2. NOT BEING ABLE TO STOP OR CONTROL WORRYING: NOT AT ALL
7. FEELING AFRAID AS IF SOMETHING AWFUL MIGHT HAPPEN: NEARLY EVERY DAY
IF YOU CHECKED OFF ANY PROBLEMS ON THIS QUESTIONNAIRE, HOW DIFFICULT HAVE THESE PROBLEMS MADE IT FOR YOU TO DO YOUR WORK, TAKE CARE OF THINGS AT HOME, OR GET ALONG WITH OTHER PEOPLE: NOT DIFFICULT AT ALL
5. BEING SO RESTLESS THAT IT IS HARD TO SIT STILL: NOT AT ALL

## 2021-07-26 ASSESSMENT — PATIENT HEALTH QUESTIONNAIRE - PHQ9
5. POOR APPETITE OR OVEREATING: NOT AT ALL
SUM OF ALL RESPONSES TO PHQ QUESTIONS 1-9: 9

## 2021-07-26 NOTE — PATIENT INSTRUCTIONS
Referred to a community psychiatrist for recent new diagnosis of ADHD inattentive type  Continue  with CBT and mindfulness for it too  Asthma stable  Take advair twice a day daily so can exercise better  Follow up with bariatrics, sleep, gyn, ortho, as needed  Compression socks for varicose veins  Should improve with weight loss  Health care maintenance reviewed  Work on advance directives  Return in nov for a physical

## 2021-07-26 NOTE — PROGRESS NOTES
Yael is a 30 year old who is being evaluated via a billable video visit.      How would you like to obtain your AVS? MyChart  If the video visit is dropped, the invitation should be resent by: Send to e-mail at: [email or web address]  Will anyone else be joining your video visit? No  Video Start Time: 845    Assessment & Plan     Attention deficit hyperactivity disorder (ADHD), predominantly inattentive type  Reviewed new diagnosis. 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   Counseled I do not manage ADHD meds  [employer] where she works is out of network so cannot refer to them but she can check with her insurance and her work place.   Referred to a community psychiatrist for recent new diagnosis of ADHD inattentive type for med management  Consider CBT for ADHD with counselor and mindfulness for it too  Asthma is stable  Take Advair twice a day daily so can exercise better  Follow up with bariatrics, sleep, gyn, ortho, as needed  Compression socks for varicose veins  Should improve with weight loss  Currently bariatric surgery approved but on hold as desires to do it with diet and exercise, has seen a Counsellor for eating habits  Health care maintenance reviewed  Work on advance directives  Return in nov for a physical   - MENTAL HEALTH REFERRAL  - Adult; Psychiatry; Psychiatry; Other: [facility] [phone]; We will contact you to schedule the appointment or please call with any questions; Future    Major depressive disorder, remission status unspecified, unspecified whether recurrent  Stable on no meds. Referred to a psychiatrist for ADHD  - MENTAL HEALTH REFERRAL  - Adult; Psychiatry; Psychiatry; Other: [facility] [phone]; We will contact you to schedule the appointment or please call with any questions; Future    JAYE (generalized anxiety disorder)  May worsen with ADHD meds. Referred to a community psychiatrist.   - MENTAL HEALTH REFERRAL  - Adult; Psychiatry; Psychiatry;  "Other: On license of [facility] [phone]; We will contact you to schedule the appointment or please call with any questions; Future    Morbid obesity (H)  Currently bariatric surgery approved but on hold as desires to do it with diet and exercise, has seen a Counsellor for eating habits  Follow up with bariatrics, sleep, gyn, ortho, as needed    Venous incompetence  Compression socks for varicose veins  Should improve with weight loss    Moderate persistent asthma without complication  Not been using Advair daily. Currently stable. Take Advair twice a day daily so can exercise better.    Review of prior external note(s) from - CareEverywhere information from [facility] reviewed  67 minutes spent on the date of the encounter doing chart review, history and exam, documentation and further activities per the note     BMI:   Estimated body mass index is 44.46 kg/m  as calculated from the following:    Height as of 11/12/20: 1.651 m (5' 5\").    Weight as of 11/12/20: 121.2 kg (267 lb 3.2 oz).   Weight management plan: Patient referred to endocrine and/or weight management specialty Discussed healthy diet and exercise guidelines    Work on weight loss  Regular exercise  See Patient Instructions    Return in about 4 months (around 11/26/2021) for Routine preventive, with me, in person.    Angela Pelayo MD  [facility] is a 30 year old who presents for the following health issues     HPI   Depression and Anxiety Follow-Up    How are you doing with your depression since your last visit? Improved     How are you doing with your anxiety since your last visit?  Improved     Are you having other symptoms that might be associated with depression or anxiety? No    Have you had a significant life event? OTHER: Dx with ADHD     Do you have any concerns with your use of alcohol or other drugs? No    Last time spoke in nov 2020 at her physical had been planning on bariatric surgery. Did get " approved for surgery but since then changed mind, is pursuing on own regarding weight loss  Got a gym membership and eating low carb. In a face book gp and heard of struggles. Some said worth it too so undecided . Has till Oct to decide  Had to see nutritionist there and a sleep specialist who did a home sleep study that was negative for CAILIN and cleared for surgery. She did see a psychologist for bariatric surgery clearance.  After approval for surgery she then saw a different psychologist to help with her eating habits, as was eating fast foods and in talking to them they thought she might have ADHD inattentive type and referred for diagnostic evaluation with Ronald Valerio. Seen by Ronald Valerio on 6/7, 6/8 and diagnostic testing done 6/17 and noted on 7/5 had a dx of ADHD inattentive type and advised to see PCP or psychiatrist to consider meds in addition to CBT and mindfulness.   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     Social History     Tobacco Use     Smoking status: Never Smoker     Smokeless tobacco: Never Used   Substance Use Topics     Alcohol use: Yes     Comment: OCC     Drug use: No     PHQ 6/9/2020 11/12/2020 7/26/2021   PHQ-9 Total Score 4 4 9   Q9: Thoughts of better off dead/self-harm past 2 weeks Not at all Not at all Not at all     JAYE-7 SCORE 6/9/2020 11/12/2020 7/26/2021   Total Score - 3 (minimal anxiety) -   Total Score 6 3 9     Last PHQ-9 7/26/2021   1.  Little interest or pleasure in doing things 0   2.  Feeling down, depressed, or hopeless 0   3.  Trouble falling or staying asleep, or sleeping too much 3   4.  Feeling tired or having little energy 1   5.  Poor appetite or overeating 3   6.  Feeling bad about yourself 1   7.  Trouble concentrating 1   8.  Moving slowly or restless 0   Q9: Thoughts of better off dead/self-harm past 2 weeks 0   PHQ-9 Total Score 9   Difficulty at work, home, or with people Somewhat difficult      JAYE-7  7/26/2021   1. Feeling nervous, anxious, or on edge 0   2. Not being able to stop or control worrying 0   3. Worrying too much about different things 3   4. Trouble relaxing 0   5. Being so restless that it is hard to sit still 0   6. Becoming easily annoyed or irritable 3   7. Feeling afraid, as if something awful might happen 3   JAYE-7 Total Score 9   If you checked any problems, how difficult have they made it for you to do your work, take care of things at home, or get along with other people? Not difficult at all       Suicide Assessment Five-step Evaluation and Treatment (SAFE-T)  Asthma Follow-Up  Was ACT completed today?    Yes    ACT Total Scores 7/26/2021   ACT TOTAL SCORE -   ASTHMA ER VISITS -   ASTHMA HOSPITALIZATIONS -   ACT TOTAL SCORE (Goal Greater than or Equal to 20) 22   In the past 12 months, how many times did you visit the emergency room for your asthma without being admitted to the hospital? 0   In the past 12 months, how many times were you hospitalized overnight because of your asthma? 0      only using Advair 2 to 3 / week not daily. Not sued albuterol in a while but used 3 days ago as felt sort of breath du eto poor air quality and start of exercise.    How many days per week do you miss taking your asthma controller medication?  4    Please describe any recent triggers for your asthma: smoke and upper respiratory infections    Have you had any Emergency Room Visits, Urgent Care Visits, or Hospital Admissions since your last office visit?  No    BACKGROUND  Hx of morbid obesity, BMI > 44, enlarged tonsils, moderate persistent asthma on albuterol inhaler & neb prn & Advair 100/50 bid, allergic to cats & dogs, FH of diabetes, prior left breast biopsy that was benign & still had  a lump in 2017, with hx of irregular menstrual cycle no longer on nuvaring, AUB on Micronor with mild improvement, u/S by gn outside [place] showed a endometrial polyp, low ferritin, palpitations, hx of  JAYE, MDD, hx of SI, hx of a lot of cancellations & no shows,   Previously under care of PCP Wiley, Seen first time by this writer on 18 for vaginal odor & treated for BV with Metrogel. Std screen was negative. Had left knee pain, exam was benign & advised quad strengthening , weight loss & referred to ENT for enlarged tonsils. Cbc, CMP, lipids, TSH, hep B,C, HIV , syphilis & GC were negative. Seen 19 by Beto for sore throat , no infection seen. GC oral was negative. Was given nuvaring,  Advair dose increased but not started, remains stable on prior lower dosing.    Seen 3/8/19 for palpitations, aware of it since the middle of 2018. Symptoms started the day her brother in law  of a heart attack  & thought maybe was anxiety initially then worried about dying from a heart attack due to obesity. When first started palpitations it would last a couple days and then over time few hours, but when seen reported it didn't t occur as frequently. Would usually note when she was lying down at night. Did not feel while at work or while walking to the bus unless had to run. Usually when occurred at night would say a prayer and go to bed and get up next am feeling fine. With the palpitations she had no associated symptoms. . She also noted her Mom was concerned about her having bipolar disorder. She & her friends did not think she had it but her Mom was concerned about bipolar and adult ADHD as mom had it. Noted periods of increased sexuality but no periods of getting by with no to less sleep & denied any visual or auditory hallucinations or psychosis.  Asthma was stable on Advair 100/50 & and albuterol prn. Noted did use her dads Symbicort inhaler when didn't have insurance & only used Albuterol neb if running out of meds. Exam was benign. Had no red flag symptom or signs. EKG showed NSR. Suspected anxiety and weight playing a role. Asthma was well controlled & continued on lower dosing of Advair.To work  on lifestyle. Referred to psychology for diagnostic testing, counseling and then psyche if needed meds regarding concern about family hx of bipolar & ADHD. Referred to cardiology, & Holter ordered & referred to bariatrics. To consider seeing sleep in future given obesity & possible hx of snoring. Was to see gyn for irregular menstrual cycle and to do pap with them.   CMP, TSH, iron, HCG, HB A1c, CBC, was normal. Ferritin was lower end of normal and Holter was normal.  Seen by gyn 3/2019 for irregular menses, recurrent BV, Cx was normal, pap not done & progesterone was normal. No showed to follow up & cancelled apt 4/4/, 4/11, 4/17 & 4/18. Seen by cardiology 4/17 & reassured heart was normal & to work on loosing weight. Seen in the ER 8/10 after drinking alcohol with Suicidal ideation but evaluated and discharged home. Seen then by [facility] gyn 8/30 for AUB & given progesterone only pill & pelvic u/S ordered , [facility] and Zeeshan signed to get report. Pelvic u/S 9/19 showed a 1.2 cm upper uterine segment polyp or submucosal fibroid & a 3.3cm simple right ovarian cyst. Seen by Gyn and PAP and GC obtained was normal. Seen by gyn 10/3/19 & planned for hysteroscopy polypectomy for AUB & u/S findings. Seen by [facility] FP on 10/25/19 for vaginal odor but wet prep was normal. Cancelled and rescheduled from 11/29/19.  Seen 12/6/19 for atypical chest pain, right leg fatigue and shin bruise and left kneecap popping out.  Examination was benign. Chest pain did not sound cardiac. Extensive work up this past year with EKG, Holter and cardiology ruled out cardiac cause. Given weight and use of  birth control though  did check basic tests. Suspected had costochondritis related to weight and sitting slouched and stress. Advised that physical therapy and weight loss could help. Right leg fatigue did not sound like a DVT and left knee symptoms were suggestive of patella femoral syndrome/ subluxation.  Asthma was reported well controlled but wheezing was heard and reported had not taken her meds that am. Gave herself an inhaler while in the room. Asthma action plan given. Sylvie/ MDD noted  stable, & declined counseling or meds at the time.  Was advised to avoid alcohol.   To consider seeing bariatrics and sleep to evaluate and treat for  possible sleep apnea and help loose weight in the future. These referrals were given earlier.  CBC, CMP, ferritin, d-dimer and EKG were normal.  Doppler was negative and just showed the old bruise right anterior shin.  Did see sports medicine on 12/19 and diagnosed with subluxation of the left patella and referred to physical therapy which she says she has started doing.  No showed or canceled several times and finally rescheduled.     Seen 1/10/2020 for preop for endometrial polyp removal.  Prior to procedure.  Did on care on 4/19 for viral symptoms.     TE done on 6/2020 desiring phentermine for weight loss & referred to bariatrics. 6/12 UA, GC HCG, TSH, cbc  in an outside clinic was normal. Seen by endo 6/19/20 & started on phentermine. Called in June as well about worsening leg edema, advised low salt, increase exercise & check with endo about meds, advised to come in for an apt to discuss more, to check for venous incompetence, felt diuretic not indicated.  Seen in UC 8/23/20 for chest tightness, nausea & vomiting. EKG was normal & given Zofran. In UC at [facility] 9/4 for vaginal symptoms/ UTI symptoms since broke up desiring std testing. Gc, UA trich was negative. Negative for Hep C, B, syphilis & HIV. Apt 9/24 cancelled due to lack of transportation.  30 min late to 10/23 apt so rescheduled.    Seen 11/2/2020 for a physical & chronic concerns. Noted asthma was controlled on Advair bid and albuterol prn and meds & asthma action plan reviewed. Had refills. Remained on Micronor Birth control managed by gyn. Was to follow pelvic pain with gyn as needed. Discussed to consider sleep  eval maybe after weight loss surgery. Was to follow up ortho as needed for knee pain. Discussed that Diet, exercise & weight loss would help. Ordered a Vein ultrasound to check for leaky veins given hx of edema. Encouraged to Elevate, avoid salt, consider compressions socks & would make further recommendations after this was reviewed. Reported chest pains sounded muscular, was monitor, & offered to do an u/s left breast if persisted. Was  Asymptomatic at the visit Wells criteria was low for PE. Was to stay active, monitor with her cycle. Was to consider a personal therapist, noted then was in family therapy.  Labs negative for Hep c, Hep B and immune, negative for syphilis, HIV. Low HDL, normal LDL, CMP, TSH, cbc  Had u/s showing varicose veins on 3/4/21 and advised compression, elevation, weight loss and referral to vascular if symptomatic despite that.     Seen by bariatrics in dec 2020  Seen by sleep 12/15/20 for snoring, apnea, obesity and sleep study ordered.  Seen by bariatrics regarding qualifying for bariatric surgery. Referred to sleep by them and had a HST on 1/2021 that was negative for sleep apnea. Seen by psychologist as part of bariatric surgery qualification process on 1/26/21, 2/4/21, 2/11/21, 2/23/21, 2/25/21. Seen by pulmonary on 2/11/21 and noted no CAILIN and cleared to go ahead with bariatric surgery. Seen by bariatrics 3/3, 4/1 & 5/6/21   No showed to apt on 5/19/21 with PCP as constipation resolved. Cleared to have bariatric surgery by may but opted to defer for now.  Seen by a psychologist for eating issues on 5/25/21 and referred for diagnostic eval. Seen by Ronald Valerio on 6/7, 6/8 and diagnostic testing done 6/17 and noted on 7/5 had a dx of ADHD inattentive type and advised to see PCP or psychiatrist to consider meds in addition to CBT and mindfulness.       Review of Systems   Constitutional, HEENT, cardiovascular, pulmonary, GI, , musculoskeletal, neuro, skin, endocrine and psych  systems are negative, except as otherwise noted.      Objective           Vitals:  No vitals were obtained today due to virtual visit.    Physical Exam   GENERAL: Healthy, alert and no distress  EYES: Eyes grossly normal to inspection.  No discharge or erythema, or obvious scleral/conjunctival abnormalities.  RESP: No audible wheeze, cough, or visible cyanosis.  No visible retractions or increased work of breathing.    SKIN: Visible skin clear. No significant rash, abnormal pigmentation or lesions.  NEURO: Cranial nerves grossly intact.  Mentation and speech appropriate for age.  PSYCH: Mentation appears normal, affect normal/bright, judgement and insight intact, normal speech and appearance well-groomed.    No results found for any visits on 07/26/21.            Video-Visit Details    Type of service:  Video Visit    Video End Time:910    Originating Location (pt. Location): Home    Distant Location (provider location):  [facility]     Platform used for Video Visit: [employer]

## 2021-07-27 ENCOUNTER — TELEPHONE (OUTPATIENT)
Dept: FAMILY MEDICINE | Facility: CLINIC | Age: 31
End: 2021-07-27

## 2021-07-27 ASSESSMENT — ASTHMA QUESTIONNAIRES: ACT_TOTALSCORE: 22

## 2021-07-27 ASSESSMENT — ANXIETY QUESTIONNAIRES: GAD7 TOTAL SCORE: 9

## 2021-07-27 NOTE — TELEPHONE ENCOUNTER
----- Message from Art Burns sent at 7/27/2021 10:53 AM CDT -----      Yael Johnson is scheduled for a Psychiatry initial appointment appointment on 08/05/21 with [facility]/ [place].    Thank you for your referral,  [facility]

## 2021-08-24 ENCOUNTER — NURSE TRIAGE (OUTPATIENT)
Dept: FAMILY MEDICINE | Facility: CLINIC | Age: 31
End: 2021-08-24

## 2021-08-24 NOTE — TELEPHONE ENCOUNTER
"Pt has shortness of breath and cough and \"a pinched nerve in my chest\"    States she has had this feeling before and had EKG and it was fine   \" A small sharp pain in my heart area\"     She has had this for 2 years now    Has had heart monitor and EKG    The shortness of breath started after she got her covid vaccine last week.       "

## 2021-08-24 NOTE — TELEPHONE ENCOUNTER
Reason for Disposition    [1] Wheezing (high pitched whistling sound) AND [2] previous asthma attacks or use of asthma medicines    [1] MILD asthma attack (e.g., no SOB at rest, mild SOB with walking, speaks normally in sentences, mild wheezing) AND [2]  persists > 24 hours on appropriate treatment    Additional Information    Negative: [1] Breathing stopped AND [2] hasn't returned    Negative: Choking on something    Negative: Severe difficulty breathing (e.g., struggling for each breath, speaks in single words)    Negative: Bluish (or gray) lips or face now    Negative: Difficult to awaken or acting confused (e.g., disoriented, slurred speech)    Negative: Passed out (i.e., lost consciousness, collapsed and was not responding)    Negative: Wheezing started suddenly after medicine, an allergic food or bee sting    Negative: Stridor    Negative: Slow, shallow and weak breathing    Negative: Sounds like a life-threatening emergency to the triager    Negative: Severe difficulty breathing (e.g., struggling for each breath, speak in single words, pulse > 120)    Negative: Bluish (or gray) lips or face now    Negative: Difficult to awaken or acting confused (e.g., disoriented, slurred speech)    Negative: Passed out (i.e., lost consciousness, collapsed and was not responding)    Negative: Wheezing started suddenly after medicine, an allergic food, or bee sting    Negative: Sounds like a life-threatening emergency to the triager    Negative: [1] SEVERE asthma attack (e.g., very SOB at rest, speaks in single words, loud wheezes) AND [2] not resolved after 2 nebulizer or inhaler treatments    Negative: Peak flow rate less than 50% of baseline level (RED zone)    Negative: [1] Severe wheezing or coughing AND [2] doesn't have neb or inhaler available    Negative: Chest pain    Negative: [1] Hospitalized before with asthma AND [2] feels the same now    Negative: [1] MODERATE asthma attack (e.g., SOB at rest, speaks in  "phrases, audible wheezes) AND [2] not resolved after 2 nebulizer or inhaler treatments given 20 minutes apart    Negative: [1] Peak flow rate 50-80% of baseline level (YELLOW zone) AND [2] not resolved after 2 nebulizer or inhaler treatments given 20 minutes apart    Negative: Asthma medicine (nebulizer or inhaler) is needed more frequently than q 4 hours to keep you comfortable    Negative: Fever > 103 F (39.4 C)    Negative: [1] Fever > 101 F (38.3 C) AND [2] age > 60    Negative: Patient sounds very sick or weak to the triager    Negative: [1] Continuous (nonstop) coughing AND [2] keeps from working or sleeping AND [3] not improved after 2 nebulizer or inhaler treatments given 20 minutes apart    Negative: [1] Wheezing or coughing AND [2] hasn't used neb or inhaler twice AND [3] it's available    Negative: [1] Influenza prevalent in community (or household) AND [2] flu symptoms (e.g., cough WITH fever, etc) AND [3] onset < 48 hours ago    Answer Assessment - Initial Assessment Questions  1. RESPIRATORY STATUS: \"Describe your breathing?\" (e.g., wheezing, shortness of breath, unable to speak, severe coughing)       shortness of breath since covid vaccine   2. ONSET: \"When did this breathing problem begin?\"       One week ago, 8/19  had this at [facility] , this was second zier  3. PATTERN \"Does the difficult breathing come and go, or has it been constant since it started?\"       Comes and goes  4. SEVERITY: \"How bad is your breathing?\" (e.g., mild, moderate, severe)     - MILD: No SOB at rest, mild SOB with walking, speaks normally in sentences, can lay down, no retractions, pulse < 100.     - MODERATE: SOB at rest, SOB with minimal exertion and prefers to sit, cannot lie down flat, speaks in phrases, mild retractions, audible wheezing, pulse 100-120.     - SEVERE: Very SOB at rest, speaks in single words, struggling to breathe, sitting hunched forward, retractions, pulse > 120     Feels shortness of breath when at " "rest \"moderately or below moderately\"  5. RECURRENT SYMPTOM: \"Have you had difficulty breathing before?\" If so, ask: \"When was the last time?\" and \"What happened that time?\"       Has asthma but his feels different  6. CARDIAC HISTORY: \"Do you have any history of heart disease?\" (e.g., heart attack, angina, bypass surgery, angioplasty)       Has had cardiac workup and was negative 2 years ago  7. LUNG HISTORY: \"Do you have any history of lung disease?\"  (e.g., pulmonary embolus, asthma, emphysema)     Has asthma but now needing to use the rescue inhaler more often  Asthma, no hx of blood clots  8. CAUSE: \"What do you think is causing the breathing problem?\"       Doesn't know, maybe the vaccine  9. OTHER SYMPTOMS: \"Do you have any other symptoms? (e.g., dizziness, runny nose, cough, chest pain, fever)      Cough - not bad at all \"just every now and then \" No fever, no runny nose, chest pain is like what she has had in the past which she describes as pinching and this for 2 years  10. PREGNANCY: \"Is there any chance you are pregnant?\" \"When was your last menstrual period?\"        Not likely   11. TRAVEL: \"Have you traveled out of the country in the last month?\" (e.g., travel history, exposures)        No outside travel out of country  Did travel to [place] on 8/6 - 8/9. Flights about 4 hours    Protocols used: BREATHING DIFFICULTY-A-AH, ASTHMA ATTACK-A-AH    "

## 2021-08-25 DIAGNOSIS — J45.30 MILD PERSISTENT ASTHMA WITHOUT COMPLICATION: ICD-10-CM

## 2021-08-25 NOTE — TELEPHONE ENCOUNTER
Reason for Call:  Medication or medication refill:    Do you use a [facility]?  Name of the pharmacy and phone number for the current request:  [facility]/pharmacy #[id number] - [place], [place] - [address]  [phone]    Name of the medication requested: Albuterol neb solution    Other request: Patient is requesting refill on this past med. Has an appointment set for 8/30 virtual visit. Can she get neb solution up until this appointment.     Can we leave a detailed message on this number? YES    Phone number patient can be reached at: Cell number on file:    Telephone Information:   Mobile [phone]       Best Time: any    Call taken on 8/25/2021 at 5:45 PM by Lulu Ervin

## 2021-08-26 RX ORDER — ALBUTEROL SULFATE 0.83 MG/ML
2.5 SOLUTION RESPIRATORY (INHALATION) EVERY 6 HOURS PRN
Qty: 27 ML | Refills: 0 | Status: SHIPPED | OUTPATIENT
Start: 2021-08-26 | End: 2023-08-08

## 2021-08-26 NOTE — TELEPHONE ENCOUNTER
She was not using her controlled med as recommended d  likely that along with poor air quality and heat affected breathing  If short of breath and needing ekg a upcoming apt note says then should be seen in person rather than virtually by one of my colleagues in my absence  Or go to  care if cant get in  Albuterol neb doesnt provide any more benefit than a albuterol inhaler   Maybe more psychological thinking it helps  Can fill but if can go over this with her

## 2021-08-26 NOTE — TELEPHONE ENCOUNTER
Dr. Pelayo-albuterol nebulizer not active on medication list.  Please review and sign if agree.  Patient is scheduled with you on 8/30/21.    Thank you!  LANDY Gilbert, RN  [facility]    ACT Total Scores 6/9/2020 11/12/2020 7/26/2021   ACT TOTAL SCORE - - -   ASTHMA ER VISITS - - -   ASTHMA HOSPITALIZATIONS - - -   ACT TOTAL SCORE (Goal Greater than or Equal to 20) 22 22 22   In the past 12 months, how many times did you visit the emergency room for your asthma without being admitted to the hospital? 0 0 0   In the past 12 months, how many times were you hospitalized overnight because of your asthma? 0 0 0

## 2021-08-26 NOTE — TELEPHONE ENCOUNTER
Writer called patent and reviewed recommendation per Dr. Pelayo.    Patient verbalized understanding and in agreement with plan.    Patient stated she will go to [facility] today.    GUILLE GilbertN, RN  [facility]

## 2021-09-18 ENCOUNTER — HEALTH MAINTENANCE LETTER (OUTPATIENT)
Age: 31
End: 2021-09-18

## 2021-10-01 ENCOUNTER — TELEPHONE (OUTPATIENT)
Dept: FAMILY MEDICINE | Facility: CLINIC | Age: 31
End: 2021-10-01

## 2021-10-01 NOTE — TELEPHONE ENCOUNTER
Reason for Call: Other    Detailed comments: Patient is requesting a letter for emergency griselda application stating she is high risk for COVID-19 due to asthma. Please assist / upload through [facility]. Thanks!    Phone Number Patient can be reached at: Cell number on file:    Telephone Information:   Mobile [phone]     Best Time: Any    Can we leave a detailed message on this number? YES    Call taken on 10/1/2021 at 2:39 PM by Gianna Elizalde

## 2021-10-01 NOTE — LETTER
[facility] [address] [place] [address]  [phone]          October 4, 2021    Yael Johnson                                                                                                                     [address]  [address]  [place] [address]            To Whom It May Concern,     Yael Johnson is at high risk for COVID-19 due to her asthma condition.         Sincerely,         Aida Carbajal CNP/ C.U.

## 2021-10-01 NOTE — LETTER
[address]  [place] [address]  [phone]          October 4, 2021    Yael Johnson                                                                                                                     [address]  [address]  [place] [address]            To Whom it May Concern,     Yael Johnson may be at higher risk for complications of COVID-19 due to her asthma.         Sincerely,         Angela Pelayo MD/ [facility]

## 2021-10-04 ENCOUNTER — MYC MEDICAL ADVICE (OUTPATIENT)
Dept: FAMILY MEDICINE | Facility: CLINIC | Age: 31
End: 2021-10-04

## 2021-10-04 NOTE — TELEPHONE ENCOUNTER
Relayed message to patient that letter was signed and sent via [employer].    LANDY Vargas RN  [facility]

## 2021-10-04 NOTE — TELEPHONE ENCOUNTER
" Providers:    \"Patient is requesting a letter for emergency griselda application stating she is high risk for COVID-19 due to asthma\"    Letter template pended in patient message (asthma is on problem list)- send to triage when/if able to complete and we will notify patient    GUILLE VargasN RN  [facility]    "

## 2021-10-05 NOTE — TELEPHONE ENCOUNTER
Sent signed letter to pt address below     [address]  [address]  [place] [address]    Umm Barnes CMA on 10/5/2021 at 8:06 AM

## 2021-10-12 ENCOUNTER — MYC MEDICAL ADVICE (OUTPATIENT)
Dept: FAMILY MEDICINE | Facility: CLINIC | Age: 31
End: 2021-10-12

## 2021-10-12 DIAGNOSIS — N92.6 IRREGULAR MENSTRUAL CYCLE: Primary | ICD-10-CM

## 2021-10-15 RX ORDER — ACETAMINOPHEN AND CODEINE PHOSPHATE 120; 12 MG/5ML; MG/5ML
0.35 SOLUTION ORAL DAILY
Qty: 84 TABLET | Refills: 0 | Status: SHIPPED | OUTPATIENT
Start: 2021-10-15 | End: 2022-01-31

## 2021-10-15 NOTE — TELEPHONE ENCOUNTER
Routing refill request to provider for review/approval because:  Medication is reported/historical    GUILLE VargasN RN  [facility]

## 2022-01-08 ENCOUNTER — HEALTH MAINTENANCE LETTER (OUTPATIENT)
Age: 32
End: 2022-01-08

## 2022-01-14 ASSESSMENT — ENCOUNTER SYMPTOMS
NAUSEA: 0
CONSTIPATION: 0
DIARRHEA: 0
BREAST MASS: 0
SORE THROAT: 0
ABDOMINAL PAIN: 0
NERVOUS/ANXIOUS: 0
JOINT SWELLING: 0
FREQUENCY: 0
HEMATOCHEZIA: 0
COUGH: 0
DIZZINESS: 0
HEADACHES: 0
SHORTNESS OF BREATH: 0
EYE PAIN: 0
FEVER: 0
WEAKNESS: 0
MYALGIAS: 0
HEMATURIA: 0
CHILLS: 0
HEARTBURN: 0
ARTHRALGIAS: 0
PALPITATIONS: 0
DYSURIA: 0
PARESTHESIAS: 0

## 2022-01-18 ENCOUNTER — OFFICE VISIT (OUTPATIENT)
Dept: FAMILY MEDICINE | Facility: CLINIC | Age: 32
End: 2022-01-18
Payer: COMMERCIAL

## 2022-01-18 ENCOUNTER — MYC MEDICAL ADVICE (OUTPATIENT)
Dept: FAMILY MEDICINE | Facility: CLINIC | Age: 32
End: 2022-01-18

## 2022-01-18 ENCOUNTER — LAB (OUTPATIENT)
Dept: LAB | Facility: CLINIC | Age: 32
End: 2022-01-18
Payer: COMMERCIAL

## 2022-01-18 VITALS
SYSTOLIC BLOOD PRESSURE: 126 MMHG | HEART RATE: 81 BPM | BODY MASS INDEX: 48.48 KG/M2 | OXYGEN SATURATION: 96 % | HEIGHT: 65 IN | TEMPERATURE: 98.2 F | WEIGHT: 291 LBS | RESPIRATION RATE: 16 BRPM | DIASTOLIC BLOOD PRESSURE: 78 MMHG

## 2022-01-18 DIAGNOSIS — F90.0 ATTENTION DEFICIT HYPERACTIVITY DISORDER (ADHD), PREDOMINANTLY INATTENTIVE TYPE: ICD-10-CM

## 2022-01-18 DIAGNOSIS — Z87.898 HISTORY OF EDEMA: ICD-10-CM

## 2022-01-18 DIAGNOSIS — F41.1 GAD (GENERALIZED ANXIETY DISORDER): ICD-10-CM

## 2022-01-18 DIAGNOSIS — S83.002S SUBLUXATION OF LEFT PATELLA, SEQUELA: ICD-10-CM

## 2022-01-18 DIAGNOSIS — I87.2 VENOUS INCOMPETENCE: ICD-10-CM

## 2022-01-18 DIAGNOSIS — Z23 NEED FOR 23-POLYVALENT PNEUMOCOCCAL POLYSACCHARIDE VACCINE: ICD-10-CM

## 2022-01-18 DIAGNOSIS — E66.01 MORBID OBESITY (H): ICD-10-CM

## 2022-01-18 DIAGNOSIS — Z13.6 ENCOUNTER FOR LIPID SCREENING FOR CARDIOVASCULAR DISEASE: ICD-10-CM

## 2022-01-18 DIAGNOSIS — R29.898 LEG FATIGUE: ICD-10-CM

## 2022-01-18 DIAGNOSIS — Z13.1 SCREENING FOR DIABETES MELLITUS: ICD-10-CM

## 2022-01-18 DIAGNOSIS — Z23 NEED FOR PROPHYLACTIC VACCINATION AND INOCULATION AGAINST INFLUENZA: ICD-10-CM

## 2022-01-18 DIAGNOSIS — F32.9 MAJOR DEPRESSIVE DISORDER, REMISSION STATUS UNSPECIFIED, UNSPECIFIED WHETHER RECURRENT: ICD-10-CM

## 2022-01-18 DIAGNOSIS — J35.1 ENLARGED TONSILS: ICD-10-CM

## 2022-01-18 DIAGNOSIS — Z83.3 FAMILY HISTORY OF DIABETES MELLITUS: ICD-10-CM

## 2022-01-18 DIAGNOSIS — Z23 NEED FOR COVID-19 VACCINE: ICD-10-CM

## 2022-01-18 DIAGNOSIS — Z13.220 ENCOUNTER FOR LIPID SCREENING FOR CARDIOVASCULAR DISEASE: ICD-10-CM

## 2022-01-18 DIAGNOSIS — Z71.89 ADVANCED DIRECTIVES, COUNSELING/DISCUSSION: ICD-10-CM

## 2022-01-18 DIAGNOSIS — Z00.00 ROUTINE HISTORY AND PHYSICAL EXAMINATION OF ADULT: Primary | ICD-10-CM

## 2022-01-18 DIAGNOSIS — J45.40 MODERATE PERSISTENT ASTHMA WITHOUT COMPLICATION: ICD-10-CM

## 2022-01-18 DIAGNOSIS — Z13.21 ENCOUNTER FOR VITAMIN DEFICIENCY SCREENING: ICD-10-CM

## 2022-01-18 DIAGNOSIS — G44.219 EPISODIC TENSION-TYPE HEADACHE, NOT INTRACTABLE: ICD-10-CM

## 2022-01-18 DIAGNOSIS — Z00.00 HEALTH CARE MAINTENANCE: ICD-10-CM

## 2022-01-18 DIAGNOSIS — Z13.0 SCREENING, ANEMIA, DEFICIENCY, IRON: ICD-10-CM

## 2022-01-18 DIAGNOSIS — N92.6 IRREGULAR MENSTRUAL CYCLE: ICD-10-CM

## 2022-01-18 DIAGNOSIS — R07.89 ATYPICAL CHEST PAIN: ICD-10-CM

## 2022-01-18 DIAGNOSIS — R06.83 SNORING: ICD-10-CM

## 2022-01-18 LAB
ALBUMIN SERPL-MCNC: 3.6 G/DL (ref 3.4–5)
ALP SERPL-CCNC: 59 U/L (ref 40–150)
ALT SERPL W P-5'-P-CCNC: 21 U/L (ref 0–50)
ANION GAP SERPL CALCULATED.3IONS-SCNC: 8 MMOL/L (ref 3–14)
AST SERPL W P-5'-P-CCNC: 12 U/L (ref 0–45)
BASOPHILS # BLD AUTO: 0 10E3/UL (ref 0–0.2)
BASOPHILS NFR BLD AUTO: 1 %
BILIRUB SERPL-MCNC: 0.4 MG/DL (ref 0.2–1.3)
BUN SERPL-MCNC: 13 MG/DL (ref 7–30)
CALCIUM SERPL-MCNC: 9.1 MG/DL (ref 8.5–10.1)
CHLORIDE BLD-SCNC: 109 MMOL/L (ref 94–109)
CHOLEST SERPL-MCNC: 103 MG/DL
CO2 SERPL-SCNC: 22 MMOL/L (ref 20–32)
CREAT SERPL-MCNC: 0.81 MG/DL (ref 0.52–1.04)
DEPRECATED CALCIDIOL+CALCIFEROL SERPL-MC: 27 UG/L (ref 20–75)
EOSINOPHIL # BLD AUTO: 0.3 10E3/UL (ref 0–0.7)
EOSINOPHIL NFR BLD AUTO: 4 %
ERYTHROCYTE [DISTWIDTH] IN BLOOD BY AUTOMATED COUNT: 14.6 % (ref 10–15)
FASTING STATUS PATIENT QL REPORTED: ABNORMAL
FERRITIN SERPL-MCNC: 26 NG/ML (ref 12–150)
GFR SERPL CREATININE-BSD FRML MDRD: >90 ML/MIN/1.73M2
GLUCOSE BLD-MCNC: 98 MG/DL (ref 70–99)
HBA1C MFR BLD: 5.6 % (ref 0–5.6)
HCT VFR BLD AUTO: 40.6 % (ref 35–47)
HDLC SERPL-MCNC: 36 MG/DL
HGB BLD-MCNC: 13.1 G/DL (ref 11.7–15.7)
IMM GRANULOCYTES # BLD: 0 10E3/UL
IMM GRANULOCYTES NFR BLD: 0 %
IRON SATN MFR SERPL: 20 % (ref 15–46)
IRON SERPL-MCNC: 64 UG/DL (ref 35–180)
LDLC SERPL CALC-MCNC: 59 MG/DL
LYMPHOCYTES # BLD AUTO: 2.2 10E3/UL (ref 0.8–5.3)
LYMPHOCYTES NFR BLD AUTO: 26 %
MCH RBC QN AUTO: 26.2 PG (ref 26.5–33)
MCHC RBC AUTO-ENTMCNC: 32.3 G/DL (ref 31.5–36.5)
MCV RBC AUTO: 81 FL (ref 78–100)
MONOCYTES # BLD AUTO: 0.6 10E3/UL (ref 0–1.3)
MONOCYTES NFR BLD AUTO: 7 %
NEUTROPHILS # BLD AUTO: 5.2 10E3/UL (ref 1.6–8.3)
NEUTROPHILS NFR BLD AUTO: 63 %
NONHDLC SERPL-MCNC: 67 MG/DL
PLATELET # BLD AUTO: 206 10E3/UL (ref 150–450)
POTASSIUM BLD-SCNC: 3.8 MMOL/L (ref 3.4–5.3)
PROT SERPL-MCNC: 7.7 G/DL (ref 6.8–8.8)
RBC # BLD AUTO: 5 10E6/UL (ref 3.8–5.2)
SODIUM SERPL-SCNC: 139 MMOL/L (ref 133–144)
TIBC SERPL-MCNC: 320 UG/DL (ref 240–430)
TRIGL SERPL-MCNC: 38 MG/DL
TSH SERPL DL<=0.005 MIU/L-ACNC: 1.02 MU/L (ref 0.4–4)
VIT B12 SERPL-MCNC: 1114 PG/ML (ref 193–986)
WBC # BLD AUTO: 8.4 10E3/UL (ref 4–11)

## 2022-01-18 PROCEDURE — 80061 LIPID PANEL: CPT

## 2022-01-18 PROCEDURE — 96127 BRIEF EMOTIONAL/BEHAV ASSMT: CPT | Mod: 59 | Performed by: FAMILY MEDICINE

## 2022-01-18 PROCEDURE — 90471 IMMUNIZATION ADMIN: CPT | Performed by: FAMILY MEDICINE

## 2022-01-18 PROCEDURE — 80050 GENERAL HEALTH PANEL: CPT

## 2022-01-18 PROCEDURE — 90732 PPSV23 VACC 2 YRS+ SUBQ/IM: CPT | Performed by: FAMILY MEDICINE

## 2022-01-18 PROCEDURE — 99214 OFFICE O/P EST MOD 30 MIN: CPT | Mod: 25 | Performed by: FAMILY MEDICINE

## 2022-01-18 PROCEDURE — 82607 VITAMIN B-12: CPT

## 2022-01-18 PROCEDURE — 83550 IRON BINDING TEST: CPT

## 2022-01-18 PROCEDURE — 82306 VITAMIN D 25 HYDROXY: CPT

## 2022-01-18 PROCEDURE — 83036 HEMOGLOBIN GLYCOSYLATED A1C: CPT

## 2022-01-18 PROCEDURE — 36415 COLL VENOUS BLD VENIPUNCTURE: CPT

## 2022-01-18 PROCEDURE — 99395 PREV VISIT EST AGE 18-39: CPT | Mod: 25 | Performed by: FAMILY MEDICINE

## 2022-01-18 PROCEDURE — 82728 ASSAY OF FERRITIN: CPT

## 2022-01-18 RX ORDER — ALBUTEROL SULFATE 90 UG/1
2 AEROSOL, METERED RESPIRATORY (INHALATION) EVERY 6 HOURS PRN
Qty: 18 G | Refills: 1 | Status: SHIPPED | OUTPATIENT
Start: 2022-01-18 | End: 2023-04-03

## 2022-01-18 RX ORDER — METHYLPHENIDATE HYDROCHLORIDE 5 MG/1
TABLET ORAL
COMMUNITY
Start: 2021-10-27 | End: 2023-06-02

## 2022-01-18 ASSESSMENT — ENCOUNTER SYMPTOMS
PARESTHESIAS: 0
SHORTNESS OF BREATH: 0
EYE PAIN: 0
PALPITATIONS: 0
DIARRHEA: 0
WEAKNESS: 0
NAUSEA: 0
NERVOUS/ANXIOUS: 0
HEADACHES: 0
HEMATOCHEZIA: 0
HEARTBURN: 0
JOINT SWELLING: 0
HEMATURIA: 0
BREAST MASS: 0
MYALGIAS: 0
FEVER: 0
CHILLS: 0
ARTHRALGIAS: 0
FREQUENCY: 0
CONSTIPATION: 0
SORE THROAT: 0
DIZZINESS: 0
COUGH: 0
ABDOMINAL PAIN: 0
DYSURIA: 0

## 2022-01-18 ASSESSMENT — ANXIETY QUESTIONNAIRES
7. FEELING AFRAID AS IF SOMETHING AWFUL MIGHT HAPPEN: MORE THAN HALF THE DAYS
4. TROUBLE RELAXING: NOT AT ALL
5. BEING SO RESTLESS THAT IT IS HARD TO SIT STILL: NOT AT ALL
7. FEELING AFRAID AS IF SOMETHING AWFUL MIGHT HAPPEN: MORE THAN HALF THE DAYS
GAD7 TOTAL SCORE: 6
GAD7 TOTAL SCORE: 6
3. WORRYING TOO MUCH ABOUT DIFFERENT THINGS: SEVERAL DAYS
1. FEELING NERVOUS, ANXIOUS, OR ON EDGE: SEVERAL DAYS
GAD7 TOTAL SCORE: 6
6. BECOMING EASILY ANNOYED OR IRRITABLE: MORE THAN HALF THE DAYS
2. NOT BEING ABLE TO STOP OR CONTROL WORRYING: NOT AT ALL

## 2022-01-18 ASSESSMENT — PATIENT HEALTH QUESTIONNAIRE - PHQ9
SUM OF ALL RESPONSES TO PHQ QUESTIONS 1-9: 7
SUM OF ALL RESPONSES TO PHQ QUESTIONS 1-9: 7
10. IF YOU CHECKED OFF ANY PROBLEMS, HOW DIFFICULT HAVE THESE PROBLEMS MADE IT FOR YOU TO DO YOUR WORK, TAKE CARE OF THINGS AT HOME, OR GET ALONG WITH OTHER PEOPLE: SOMEWHAT DIFFICULT

## 2022-01-18 ASSESSMENT — MIFFLIN-ST. JEOR: SCORE: 2045.85

## 2022-01-18 NOTE — PROGRESS NOTES
SUBJECTIVE:   CC: Yael Johnson is an 31 year old woman who presents for preventive health visit.     Healthy Habits:     Getting at least 3 servings of Calcium per day:  NO    Bi-annual eye exam:  Yes    Dental care twice a year:  NO    Sleep apnea or symptoms of sleep apnea:  Daytime drowsiness    Diet:  Regular (no restrictions)    Frequency of exercise:  1 day/week    Duration of exercise:  Other    Taking medications regularly:  Yes    Medication side effects:  None    PHQ-2 Total Score: 0    Additional concerns today:  Yes  Answers for HPI/ROS submitted by the patient on 1/18/2022  If you checked off any problems, how difficult have these problems made it for you to do your work, take care of things at home, or get along with other people?: Somewhat difficult  PHQ9 TOTAL SCORE: 7  JAYE 7 TOTAL SCORE: 6    Here for a physical with Mom ( does not own a car due to cost & mom drove her here)  No breast issues  Breast biopsy neg in 2017 showed a benign fibroadenoma.   On Micronor for birth control. Back together with boyfriend.  Pap due but reports To see gyn at [facility] on the 25th & will get that done there & BCP to be refilled by them then   Feels no std testing needed    BMI > 48. Had been approved for bariatric surgery through [facility] lat year but desires to loose weight with diet & exercise & held off on doing surgery. Recently started weight watchers & has lost 6 lbs in 3 weeks.     Hx of venous incompetence, Slight dependant edema end of day on ankles, Not wearing compression socks    Has irregular periods, is on Micronor. Last month had two periods. Going to discuss PCOS with gyn when sees them next week on the 25th.     Asthma stable, but using Advair once every other day instead of bid daily. Has two room mates now & one smoked & had to use Advair more but now roommate smokes outside & not affecting her any more.     Ongoing pinching sharp poke like chest pain intermittent, past 3 yrs, not recently.  "Did see cardiology Dr Ceja at [facility] in summer when seen after an  visit for asthma exacerbation & atypical chest pain. Felt was not cardiac but advised a stress echo ( 8/2021 \" 1. Atypical pinching sensation in the left chest. It does not sound typical for cardiac. We will get a stress echo and follow up with results. 2. Upper respiratory type symptoms following COVID vaccination. Recommend she contact the location this was administered and report this.  3. Recent asthma exacerbation, now improving. We probably will wait until after the asthma has improved in the next week or so and then do the stress echo.  4. Overweight. We can review an exercise prescription with the results of the stress echo which will also allow us to look at resting function and exclude the very rare possibility of myocarditis.\")  Not scheduled stress test yet.    Enlarged tonsil not bothering her  Hx of snoring: did a sleep study and neg for CAILIN,   FH of dm    Left patella subluxation hx, knee doing ok, not popping out of place, last week did once. Then able to squat and get it back in place.    MDD/JAYE table on no meds  ADHD: On ritalin 5 mg takes prn when working like day shift. 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 So thinks wont be taking much any more. Not in counseling. Follows with psyche as needed    Health care maintenance reviewed  Discussed having health care directives.    Declines flu shot.  Ok to get pneumonia vaccine  Declines Covid booster. Reports got pfizer Covid shot & feels got a cough & shortness of breath from it that lasted 2 to 3 weeks in the summer. Was tested & negative for Covid.     BACKGROUND  Hx of morbid obesity, BMI > 44, enlarged tonsils, moderate persistent asthma on albuterol inhaler & neb prn & Advair 100/50 bid, allergic to cats & dogs, FH of diabetes, prior left breast biopsy that was benign & still had  a lump in 2017, with hx of " irregular menstrual cycle no longer on nuvaring, AUB on Micronor with mild improvement, u/S by gn outside [facility] showed a endometrial polyp, low ferritin, palpitations, hx of JAYE, MDD, hx of SI, hx of a lot of cancellations & no shows,   Previously under care of PCP Wiley, Seen first time by this writer on 18 for vaginal odor & treated for BV with Metrogel. Std screen was negative. Had left knee pain, exam was benign & advised quad strengthening , weight loss & referred to ENT for enlarged tonsils. Cbc, CMP, lipids, TSH, hep B,C, HIV , syphilis & GC were negative. Seen 19 by Beto for sore throat , no infection seen. GC oral was negative. Was given nuvaring,  Advair dose increased but not started, remains stable on prior lower dosing.    Seen 3/8/19 for palpitations, aware of it since the middle of 2018. Symptoms started the day her brother in law  of a heart attack  & thought maybe was anxiety initially then worried about dying from a heart attack due to obesity. When first started palpitations it would last a couple days and then over time few hours, but when seen reported it didn't t occur as frequently. Would usually note when she was lying down at night. Did not feel while at work or while walking to the bus unless had to run. Usually when occurred at night would say a prayer and go to bed and get up next am feeling fine. With the palpitations she had no associated symptoms. . She also noted her Mom was concerned about her having bipolar disorder. She & her friends did not think she had it but her Mom was concerned about bipolar and adult ADHD as mom had it. Noted periods of increased sexuality but no periods of getting by with no to less sleep & denied any visual or auditory hallucinations or psychosis.  Asthma was stable on Advair 100/50 & and albuterol prn. Noted did use her dads Symbicort inhaler when didn't have insurance & only used Albuterol neb if running out of meds. Exam was  benign. Had no red flag symptom or signs. EKG showed NSR. Suspected anxiety and weight playing a role. Asthma was well controlled & continued on lower dosing of Advair.To work on lifestyle. Referred to psychology for diagnostic testing, counseling and then psyche if needed meds regarding concern about family hx of bipolar & ADHD. Referred to cardiology, & Holter ordered & referred to bariatrics. To consider seeing sleep in future given obesity & possible hx of snoring. Was to see gyn for irregular menstrual cycle and to do pap with them.   CMP, TSH, iron, HCG, HB A1c, CBC, was normal. Ferritin was lower end of normal and Holter was normal.  Seen by gyn 3/2019 for irregular menses, recurrent BV, Cx was normal, pap not done & progesterone was normal. No showed to follow up & cancelled apt 4/4/, 4/11, 4/17 & 4/18. Seen by cardiology 4/17 & reassured heart was normal & to work on loosing weight. Seen in the ER 8/10 after drinking alcohol with Suicidal ideation but evaluated and discharged home. Seen then by [facility] g[facility] 8/30 for AUB & given progesterone only pill & pelvic u/S ordered , thought [facility] and Zeeshan signed to get report. Pelvic u/S 9/19 showed a 1.2 cm upper uterine segment polyp or submucosal fibroid & a 3.3cm simple right ovarian cyst. Seen by Gyn and PAP and GC obtained was normal. Seen by gyn 10/3/19 & planned for hysteroscopy polypectomy for AUB & u/S findings. Seen by [facility] on 10/25/19 for vaginal odor but wet prep was normal. Cancelled and rescheduled from 11/29/19.  Seen 12/6/19 for atypical chest pain, right leg fatigue and shin bruise and left kneecap popping out.  Examination was benign. Chest pain did not sound cardiac. Extensive work up this past year with EKG, Holter and cardiology ruled out cardiac cause. Given weight and use of  birth control though  did check basic tests. Suspected had costochondritis related to weight and sitting slouched and stress. Advised  that physical therapy and weight loss could help. Right leg fatigue did not sound like a DVT and left knee symptoms were suggestive of patella femoral syndrome/ subluxation. Asthma was reported well controlled but wheezing was heard and reported had not taken her meds that am. Gave herself an inhaler while in the room. Asthma action plan given. Sylvie/ MDD noted  stable, & declined counseling or meds at the time.  Was advised to avoid alcohol.   To consider seeing bariatrics and sleep to evaluate and treat for  possible sleep apnea and help loose weight in the future. These referrals were given earlier.  CBC, CMP, ferritin, d-dimer and EKG were normal.  Doppler was negative and just showed the old bruise right anterior shin.  Did see sports medicine on 12/19 and diagnosed with subluxation of the left patella and referred to physical therapy which she says she has started doing.  No showed or canceled several times and finally rescheduled.     Seen 1/10/2020 for preop for endometrial polyp removal.  Prior to procedure.  Did on care on 4/19 for viral symptoms.     TE done on 6/2020 desiring phentermine for weight loss & referred to bariatrics. 6/12 UA, GC HCG, TSH, cbc  in an outside clinic was normal. Seen by endo 6/19/20 & started on phentermine. Called in June as well about worsening leg edema, advised low salt, increase exercise & check with endo about meds, advised to come in for an apt to discuss more, to check for venous incompetence, felt diuretic not indicated.  Seen in UC 8/23/20 for chest tightness, nausea & vomiting. EKG was normal & given Zofran. In UC at [facility] 9/4 for vaginal symptoms/ UTI symptoms since broke up desiring std testing. Gc, UA trich was negative. Negative for Hep C, B, syphilis & HIV. Apt 9/24 cancelled due to lack of transportation.  30 min late to 10/23 apt so rescheduled.     Seen 11/2/2020 for a physical & chronic concerns. Noted asthma was controlled on Advair bid and albuterol prn and  meds & asthma action plan reviewed. Had refills. Remained on Micronor Birth control managed by gyn. Was to follow pelvic pain with gyn as needed. Discussed to consider sleep eval maybe after weight loss surgery. Was to follow up ortho as needed for knee pain. Discussed that Diet, exercise & weight loss would help. Ordered a Vein ultrasound to check for leaky veins given hx of edema. Encouraged to Elevate, avoid salt, consider compressions socks & would make further recommendations after this was reviewed. Reported chest pains sounded muscular, was monitor, & offered to do an u/s left breast if persisted. Was  Asymptomatic at the visit Wells criteria was low for PE. Was to stay active, monitor with her cycle. Was to consider a personal therapist, noted then was in family therapy.  Labs negative for Hep c, Hep B and immune, negative for syphilis, HIV. Low HDL, normal LDL, CMP, TSH, cbc  Had u/s showing varicose veins on 3/4/21 and advised compression, elevation, weight loss and referral to vascular if symptomatic despite that.      Seen by bariatrics in dec 2020  Seen by sleep 12/15/20 for snoring, apnea, obesity and sleep study ordered.  Seen by bariatrics regarding qualifying for bariatric surgery. Referred to sleep by them and had a HST on 1/2021 that was negative for sleep apnea. Seen by psychologist as part of bariatric surgery qualification process on 1/26/21, 2/4/21, 2/11/21, 2/23/21, 2/25/21. Seen by pulmonary on 2/11/21 and noted no CAILIN and cleared to go ahead with bariatric surgery. Seen by bariatrics 3/3, 4/1 & 5/6/21   No showed to apt on 5/19/21 with PCP as constipation resolved. Cleared to have bariatric surgery by may but opted to defer for now.  Seen by a psychologist for eating issues on 5/25/21 and referred for diagnostic eval. Seen by Ronald Valerio on 6/7, 6/8 and diagnostic testing done 6/17 and noted on 7/5 had a dx of ADHD inattentive type and advised to see PCP or psychiatrist to consider meds  in addition to CBT and mindfulness.     Seen by me virtually 7/26/21 for new dx of ADHD. Reviewed new diagnosis. 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 Counseled I do not manage ADHD meds. [employer] where she works is out of network so cannot refer to them but she was to check with her insurance and her work place. Referred to a community psychiatrist for recent new diagnosis of ADHD inattentive type for med management. Encouraged to consider CBT for ADHD with counselor and mindfulness for it too. Noted asthma was stable. Encouraged to use Advair twice a day daily so could exercise better. Was to follow up with bariatrics, sleep, gyn, ortho, as needed. Advised to use compression socks for varicose veins, felt should improve with weight loss. Noted had been approved for bariatric surgery but had decided to place on hold as desired to do it with diet and exercise, had seen a Counsellor for eating habits. Health care maintenance reviewed. Reminded to work on advance directives & was to return in nov for a physical.    Called 8/26 about intermittent chest tightness & sharp pinching left sided chest pain.advised to increase Advair to usual twice a day dosing & to be seen if having chest pain. Given albuterol neb on request but counseled would be no different than us of her albuterol inhaler.  Seen at [facility] on 8/27/21, felt had asthma exacerbation, Covid testing done, given a duneb & sent home on prednisone & referred to cardiology. Covid test came back negative. cxr normal.  Seen by cardiology at [facility] on 8/31/21 for pinching sensation in chest every few days past 3 yrs. EKG showed NSR, borderline incomplete right bundle branch block. Corrected QT interval normal. Borderline voltage for left ventricular hypertrophy.  Cardiology diagnosed with atypical pinching sensation in the left chest that did not sound typical for cardiac. Advised to get a stress echo and follow up with results.. reported had Upper  respiratory type symptoms following a COVID vaccination. Her recent asthma exacerbation, had improved. Weight was discussed were to review an exercise prescription once results of the stress echo resting function reviewed and excluded the very rare possibility of myocarditis. Stress echo not scheduled.   Telemedicine done with nutritionist at [facility] on 11/17/21 for [facility] weight management.  No showed to PCP apt 11/30/21.     [place]  shows received methylphenidate 5 mg #14 on 9/18, then 10 mg # 30 on 10/4/21 & 5 mg # 60 on 10/27/21 by Vince Maynor    Today's PHQ-2 Score:   PHQ-2 ( 1999 Pfizer) 1/14/2022   Q1: Little interest or pleasure in doing things 0   Q2: Feeling down, depressed or hopeless 0   PHQ-2 Score 0   PHQ-2 Total Score (12-17 Years)- Positive if 3 or more points; Administer PHQ-A if positive -   Q1: Little interest or pleasure in doing things Not at all   Q2: Feeling down, depressed or hopeless Not at all   PHQ-2 Score 0     Abuse: Current or Past (Physical, Sexual or Emotional) - No  Do you feel safe in your environment? Yes    Social History     Tobacco Use     Smoking status: Never Smoker     Smokeless tobacco: Never Used   Substance Use Topics     Alcohol use: Yes     Comment: OCC     If you drink alcohol do you typically have >3 drinks per day or >7 drinks per week? No    Alcohol Use 1/18/2022   Prescreen: >3 drinks/day or >7 drinks/week? -   Prescreen: >3 drinks/day or >7 drinks/week? No   No flowsheet data found.    Reviewed orders with patient.  Reviewed health maintenance and updated orders accordingly - Yes  Lab work is in process  Labs reviewed in EPIC  BP Readings from Last 3 Encounters:   01/18/22 126/78   11/12/20 123/78   08/23/20 132/86    Wt Readings from Last 3 Encounters:   01/18/22 132 kg (291 lb)   11/12/20 121.2 kg (267 lb 3.2 oz)   08/23/20 115.7 kg (255 lb)                  Patient Active Problem List   Diagnosis     Family history of diabetes mellitus     Enlarged tonsils      "Moderate persistent asthma without complication     Morbid obesity (H)     Irregular menstrual cycle     JAYE (generalized anxiety disorder)     Major depressive disorder, remission status unspecified, unspecified whether recurrent     Subluxation of left patella, sequela     Venous incompetence     Attention deficit hyperactivity disorder (ADHD), predominantly inattentive type     Past Surgical History:   Procedure Laterality Date     BIOPSY  2018     GYN SURGERY  2019    Vaginal polyop     US BREAST BIOPSY LT  2017    benign, still has a lump       Social History     Tobacco Use     Smoking status: Never Smoker     Smokeless tobacco: Never Used   Substance Use Topics     Alcohol use: Yes     Comment: OCC     Family History   Problem Relation Age of Onset     Hypertension Mother      Diabetes Mother         type 2     Arthritis Mother      Bipolar Disorder Mother         per mom     Obesity Mother      Diabetes Maternal Grandmother         type 2     Cerebrovascular Disease Maternal Grandmother      Cardiovascular Paternal Grandfather         Lung         Current Outpatient Medications   Medication Sig Dispense Refill     albuterol (PROAIR HFA/PROVENTIL HFA/VENTOLIN HFA) 108 (90 Base) MCG/ACT inhaler Inhale 2 puffs into the lungs every 6 hours as needed for shortness of breath / dyspnea or wheezing 18 g 1     albuterol (PROVENTIL) (2.5 MG/3ML) 0.083% neb solution Take 1 vial (2.5 mg) by nebulization every 6 hours as needed for shortness of breath / dyspnea 27 mL 0     fluticasone-salmeterol (ADVAIR DISKUS) 100-50 MCG/DOSE inhaler Inhale 1 puff into the lungs 2 times daily 60 each 3     methylphenidate (RITALIN) 5 MG tablet        norethindrone (MICRONOR) 0.35 MG tablet Take 1 tablet (0.35 mg) by mouth daily 84 tablet 0     Allergies   Allergen Reactions     Cats      Copper Other (See Comments)     \"breakouts\"     Dogs      Recent Labs   Lab Test 01/18/22  1223 11/12/20  1109 12/06/19  1419 03/08/19  1232 " 18  1458   A1C 5.6  --   --  5.3 5.5   LDL 59 70  --   --  52   HDL 36* 31*  --   --  29*   TRIG 38 25  --   --  73   ALT 21 21 19 18 22   CR 0.81 0.73 0.90 0.76 0.73   GFRESTIMATED >90 >90 88 >90 >90   GFRESTBLACK  --  >90 >90 >90 >90   POTASSIUM 3.8 3.9 3.7 4.0 3.5   TSH 1.02 0.98  --  0.58 0.55      Breast Cancer Screening:    Breast CA Risk Assessment (FHS-7) 2022   Do you have a family history of breast, colon, or ovarian cancer? No / Unknown     Patient under 40 years of age: Routine Mammogram Screening not recommended.   Pertinent mammograms are reviewed under the imaging tab.    History of abnormal Pap smear:   NO - age 21-29 PAP every 3 years recommended  Last 3 Pap and HPV Results:   PAP / HPV 10/7/2016   PAP (Historical) NIL     PAP / HPV 10/7/2016   PAP (Historical) NIL     Reviewed and updated as needed this visit by clinical staff  Tobacco  Allergies  Meds  Problems  Med Hx  Surg Hx  Fam Hx  Soc Hx         Reviewed and updated as needed this visit by Provider  Tobacco  Allergies  Meds  Problems  Med Hx  Surg Hx  Fam Hx  Soc Hx        Past Medical History:   Diagnosis Date     Abnormal uterine bleeding (AUB) 2019     Asthma      Enlarged tonsils      History of edema 10/23/2020     Leg fatigue 10/23/2020     Low ferritin 2019     Palpitations 2019     Uterine polyp 2019      Past Surgical History:   Procedure Laterality Date     BIOPSY  2018     GYN SURGERY  2019    Vaginal polyop     US BREAST BIOPSY LT  2017    benign, still has a lump     OB History    Para Term  AB Living   0 0 0 0 0 0   SAB IAB Ectopic Multiple Live Births   0 0 0 0 0       Review of Systems   Constitutional: Negative for chills and fever.   HENT: Negative for congestion, ear pain, hearing loss and sore throat.    Eyes: Negative for pain and visual disturbance.   Respiratory: Negative for cough and shortness of breath.    Cardiovascular: Positive for chest pain. Negative  "for palpitations and peripheral edema.   Gastrointestinal: Negative for abdominal pain, constipation, diarrhea, heartburn, hematochezia and nausea.   Breasts:  Negative for tenderness, breast mass and discharge.   Genitourinary: Positive for vaginal bleeding. Negative for dysuria, frequency, genital sores, hematuria, pelvic pain, urgency and vaginal discharge.   Musculoskeletal: Negative for arthralgias, joint swelling and myalgias.   Skin: Negative for rash.   Neurological: Negative for dizziness, weakness, headaches and paresthesias.   Psychiatric/Behavioral: Positive for mood changes. The patient is not nervous/anxious.       OBJECTIVE:   /78 (BP Location: Left arm, Patient Position: Sitting, Cuff Size: Adult Large)   Pulse 81   Temp 98.2  F (36.8  C) (Temporal)   Resp 16   Ht 1.651 m (5' 5\")   Wt 132 kg (291 lb)   LMP 12/25/2021 (Exact Date)   SpO2 96%   BMI 48.42 kg/m    Physical Exam  GENERAL: healthy, alert, no distress and obese  EYES: Eyes grossly normal to inspection, PERRL and conjunctivae and sclerae normal  HENT: ear canals and TM's normal, nose and mouth without ulcers or lesions  NECK: no adenopathy, no asymmetry, masses, or scars and thyroid normal to palpation  RESP: lungs clear to auscultation - no rales, rhonchi or wheezes  BREAST: normal without masses, tenderness or nipple discharge and no palpable axillary masses or adenopathy  CV: regular rate and rhythm, normal S1 S2, no S3 or S4, no murmur, click or rub, no peripheral edema and peripheral pulses strong  ABDOMEN: soft, non tender, no hepatosplenomegaly, no masses and bowel sounds normal  MS: no gross musculoskeletal defects noted, no edema  SKIN: no suspicious lesions or rashes  NEURO: Normal strength and tone, mentation intact and speech normal  PSYCH: mentation appears normal, affect normal/bright    Diagnostic Test Results:  Labs reviewed in Epic  No results found for this or any previous visit (from the past 24 " hour(s)).  Results for orders placed or performed in visit on 01/18/22   Comprehensive metabolic panel (BMP + Alb, Alk Phos, ALT, AST, Total. Bili, TP)     Status: Normal   Result Value Ref Range    Sodium 139 133 - 144 mmol/L    Potassium 3.8 3.4 - 5.3 mmol/L    Chloride 109 94 - 109 mmol/L    Carbon Dioxide (CO2) 22 20 - 32 mmol/L    Anion Gap 8 3 - 14 mmol/L    Urea Nitrogen 13 7 - 30 mg/dL    Creatinine 0.81 0.52 - 1.04 mg/dL    Calcium 9.1 8.5 - 10.1 mg/dL    Glucose 98 70 - 99 mg/dL    Alkaline Phosphatase 59 40 - 150 U/L    AST 12 0 - 45 U/L    ALT 21 0 - 50 U/L    Protein Total 7.7 6.8 - 8.8 g/dL    Albumin 3.6 3.4 - 5.0 g/dL    Bilirubin Total 0.4 0.2 - 1.3 mg/dL    GFR Estimate >90 >60 mL/min/1.73m2   Hemoglobin A1c     Status: Normal   Result Value Ref Range    Hemoglobin A1C 5.6 0.0 - 5.6 %   Lipid panel reflex to direct LDL Fasting     Status: Abnormal   Result Value Ref Range    Cholesterol 103 <200 mg/dL    Triglycerides 38 <150 mg/dL    Direct Measure HDL 36 (L) >=50 mg/dL    LDL Cholesterol Calculated 59 <=100 mg/dL    Non HDL Cholesterol 67 <130 mg/dL    Patient Fasting > 8hrs? Unknown     Narrative    Cholesterol  Desirable:  <200 mg/dL    Triglycerides  Normal:  Less than 150 mg/dL  Borderline High:  150-199 mg/dL  High:  200-499 mg/dL  Very High:  Greater than or equal to 500 mg/dL    Direct Measure HDL  Female:  Greater than or equal to 50 mg/dL   Male:  Greater than or equal to 40 mg/dL    LDL Cholesterol  Desirable:  <100mg/dL  Above Desirable:  100-129 mg/dL   Borderline High:  130-159 mg/dL   High:  160-189 mg/dL   Very High:  >= 190 mg/dL    Non HDL Cholesterol  Desirable:  130 mg/dL  Above Desirable:  130-159 mg/dL  Borderline High:  160-189 mg/dL  High:  190-219 mg/dL  Very High:  Greater than or equal to 220 mg/dL   Vitamin D Deficiency     Status: Normal   Result Value Ref Range    Vitamin D, Total (25-Hydroxy) 27 20 - 75 ug/L    Narrative    Season, race, dietary intake, and treatment  affect the concentration of 25-hydroxy-Vitamin D. Values may decrease during winter months and increase during summer months. Values 20-29 ug/L may indicate Vitamin D insufficiency and values <20 ug/L may indicate Vitamin D deficiency.    Vitamin D determination is routinely performed by an immunoassay specific for 25 hydroxyvitamin D3.  If an individual is on vitamin D2(ergocalciferol) supplementation, please specify 25 OH vitamin D2 and D3 level determination by LCMSMS test [id number].     Ferritin     Status: Normal   Result Value Ref Range    Ferritin 26 12 - 150 ng/mL   Iron and iron binding capacity     Status: Normal   Result Value Ref Range    Iron 64 35 - 180 ug/dL    Iron Binding Capacity 320 240 - 430 ug/dL    Iron Sat Index 20 15 - 46 %   Vitamin B12     Status: Abnormal   Result Value Ref Range    Vitamin B12 1,114 (H) 193 - 986 pg/mL   TSH with free T4 reflex     Status: Normal   Result Value Ref Range    TSH 1.02 0.40 - 4.00 mU/L   CBC with platelets and differential     Status: Abnormal   Result Value Ref Range    WBC Count 8.4 4.0 - 11.0 10e3/uL    RBC Count 5.00 3.80 - 5.20 10e6/uL    Hemoglobin 13.1 11.7 - 15.7 g/dL    Hematocrit 40.6 35.0 - 47.0 %    MCV 81 78 - 100 fL    MCH 26.2 (L) 26.5 - 33.0 pg    MCHC 32.3 31.5 - 36.5 g/dL    RDW 14.6 10.0 - 15.0 %    Platelet Count 206 150 - 450 10e3/uL    % Neutrophils 63 %    % Lymphocytes 26 %    % Monocytes 7 %    % Eosinophils 4 %    % Basophils 1 %    % Immature Granulocytes 0 %    Absolute Neutrophils 5.2 1.6 - 8.3 10e3/uL    Absolute Lymphocytes 2.2 0.8 - 5.3 10e3/uL    Absolute Monocytes 0.6 0.0 - 1.3 10e3/uL    Absolute Eosinophils 0.3 0.0 - 0.7 10e3/uL    Absolute Basophils 0.0 0.0 - 0.2 10e3/uL    Absolute Immature Granulocytes 0.0 <=0.4 10e3/uL   CBC with platelets and differential     Status: Abnormal    Narrative    The following orders were created for panel order CBC with platelets and differential.  Procedure                                Abnormality         Status                     ---------                               -----------         ------                     CBC with platelets and d...[[id number]]  Abnormal            Final result                 Please view results for these tests on the individual orders.       ASSESSMENT/PLAN:       ICD-10-CM    1. Routine history and physical examination of adult  Z00.00 PPSV23, IM/SUBQ (2+ YRS) - [id number]   2. Morbid obesity (H)  E66.01    3. Venous incompetence  I87.2 Compression Sleeve/Stocking Order for DME - ONLY FOR DME   4. Irregular menstrual cycle  N92.6    5. Moderate persistent asthma without complication  J45.40 PPSV23, IM/SUBQ (2+ YRS) - [id number]     albuterol (PROAIR HFA/PROVENTIL HFA/VENTOLIN HFA) 108 (90 Base) MCG/ACT inhaler     fluticasone-salmeterol (ADVAIR DISKUS) 100-50 MCG/DOSE inhaler   6. Atypical chest pain  R07.89    7. Enlarged tonsils  J35.1    8. Snoring  R06.83    9. Family history of diabetes mellitus  Z83.3    10. Subluxation of left patella, sequela  S83.002S    11. Major depressive disorder, remission status unspecified, unspecified whether recurrent  F32.9    12. JAYE (generalized anxiety disorder)  F41.1    13. Attention deficit hyperactivity disorder (ADHD), predominantly inattentive type  F90.0    14. Health care maintenance  Z00.00 REVIEW OF HEALTH MAINTENANCE PROTOCOL ORDERS   15. Advanced directives, counseling/discussion  Z71.89    16. Need for prophylactic vaccination and inoculation against influenza  Z23    17. Need for 23-polyvalent pneumococcal polysaccharide vaccine  Z23 PPSV23, IM/SUBQ (2+ YRS) - [id number]   18. Need for COVID-19 vaccine  Z23      Seen for preventive health and additional concerns today   Self breast check regularly   Prior history of left breast fibroadenoma 5 o'clock position 3 cm from nipple s/p an ultrasound-guided core biopsy which did not show any cancer or abnormal cells and no further follow-up needed for that. Currently  asymptomatic and mammogram due age 42.  Pelvic / PAP/ HPV due and to get with her gynecologist on 1/25/2022 next week at [facility]  Health care maintenance reviewed.  Consider working on healthcare directives and honoring choices form given.  Labs today and will make further recommendations once reviewed  Blood type has not been checked in the past. Labs already done prior to appointment. Offered to restick to get or could get with gynecologist when seeing them on the 25th. To wait to see Gyn to get blood type done  Recommended the flu shot but currently opts to decline.  Pneumovax 23 given today given hx of asthma.  Recommended a COVID booster but opted to wait, do not suspect shortness of breath and cough due to vaccine in the past. May have had a coincidental viral infection at the same time or related to asthma.    BMI more than 48, working with weight watchers and has lost 6 pounds,  Congratulations!. Continue with that. Previously seen by bariatrics & was cleared for surgery in 2021 at [facility] but opted not to go that route. Can refer back to bariatrics if needed in the future.    Given compression socks to wear during the day for history of venous incompetence and dependent edema. Try to get up and stretch legs and do some foot pumps. Advised to take the prescription given to home medical store where they can measure and give her the appropriate size compression sock.    Irregular menstrual cycle likely related to weight and progesterone only birth control can also cause some irregularity. To discuss evaluation of PCOS with gynecologist when sees them on the 25th. Hemoglobin A1c today did not show diabetes or prediabetes. Further refills of progesterone only birth control to be done by gynecologist at the visit.    For asthma, well controlled. But only using Advair once every other day. Continue Advair 1 puff twice a day preferably, currently using every other day with good symptom control. May bump up to  recommended dosing when feeling more short of breath. Refilled Advair and albuterol today. Asthma action plan in place.    Hx of intermittent pinching left sided chest wall atypical chest pain off and on in the past. Last occurred in August 2021 when seen by an urgent care at [facility] and referred to cardiologist who did an EKG which was normal. Symptoms do not sound cardiac but was recommended to get a stress test which did not get done. Is to call to reschedule that at [facility].     History of enlarged tonsils currently asymptomatic.  History of snoring but has been checked with a sleep evaluation done at [facility] in 2021 & was negative for obstructive sleep apnea. Try and avoid sleeping on back is much as possible.    Family history of diabetes but currently hemoglobin A1c does not suggest diabetes.    History of subluxation left patella. Recommend quadriceps isometric strengthening exercises as demonstrated at visit today. Recommend ten sets twice a day. I suspect weight loss will also help. If no improvement or gets worse recommend follow-up with orthopedics    History of depression and anxiety stable off medications.  History of ADHD seen by community psychiatrist and given Ritalin 5 mg. 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 Recommend follow-up with her community psychiatrist as needed and can refer to a counselor if needs more help.    Check in 6 months for asthma and in 1 year for preventive physical    Patient has been advised of split billing requirements and indicates understanding: Yes  COUNSELING:  Reviewed preventive health counseling, as reflected in patient instructions       Regular exercise       Healthy diet/nutrition       Vision screening       Immunizations    Vaccinated for: Pneumococcal and Declined: Influenza due to Other not clear.           Alcohol Use        "Contraception       Safe sex practices/STD prevention       The ASCVD Risk score (Rekha DOSS Jr., et al., 2013) failed to calculate for the following reasons:    The 2013 ASCVD risk score is only valid for ages 40 to 79       Advance Care Planning    Estimated body mass index is 48.42 kg/m  as calculated from the following:    Height as of this encounter: 1.651 m (5' 5\").    Weight as of this encounter: 132 kg (291 lb).    Weight management plan: Discussed healthy diet and exercise guidelines    She reports that she has never smoked. She has never used smokeless tobacco.      Counseling Resources:  ATP IV Guidelines  Pooled Cohorts Equation Calculator  Breast Cancer Risk Calculator  BRCA-Related Cancer Risk Assessment: FHS-7 Tool  FRAX Risk Assessment  ICSI Preventive Guidelines  Dietary Guidelines for Americans, 2010  USDA's MyPlate  ASA Prophylaxis  Lung CA Screening    Angela Pelayo MD  [facility]        "

## 2022-01-18 NOTE — RESULT ENCOUNTER NOTE
Lyle Hernadez Johnson,  Your results came back and you dont have diabetes. See you at your apt soon. If you have any further concerns please do not hesitate to contact us by message, phone or making an appointment.  Have a good day   Dr Pierce XAVIER

## 2022-01-18 NOTE — RESULT ENCOUNTER NOTE
Lyle Ms. Johnson,  Your results came back and are within acceptable limits. -TSH (thyroid stimulating hormone) level is normal which indicates normal thyroid function.. If you have any further concerns please do not hesitate to contact us by message, phone or making an appointment.  Have a good day   Dr Pierce XAVIER

## 2022-01-18 NOTE — PATIENT INSTRUCTIONS
Seen for preventive health and additional concerns today   Self breast check regularly   Prior history of left breast fibroadenoma 5 o'clock position 3 cm from nipple s/p an ultrasound-guided core biopsy which did not show any cancer or abnormal cells and no further follow-up needed for that. Currently asymptomatic and mammogram due age 42.  Pelvic / PAP/ HPV due and to get with your gynecologist on 1/25/2022 next week at [facility] maintenance reviewed.  Consider working on healthcare directives and honoring choices form given.  Labs today and will make further recommendations once reviewed  Blood type has not been checked in the past. Labs already done prior to appointment. Offered to restick to get or could get with gynecologist when seeing them on the 25th. To wait to see Gyn to get blood type done  Recomended the flu shot but currently opts to decline.  Pneumovax 23 given today given hx of asthma.  Recommended a COVID booster but opted to wait, do not suspect shortness of breath and cough due to vaccine in the past. May have had a coincidental viral infection at the same time or related to asthma.    BMI more than 48, working on weight watchers and has lost 6 pounds,  Congratulations!. Continue with that. Previously seen by bariatrics & was cleared for surgery in 2021 at [facility] but opted not to go that route. Can refer back to bariatrics if needed in the future.    Given compression socks to wear during the day for history of venous incompetence and dependent edema. Try to get up and stretch legs and do some foot pumps. Advised to take the prescription given to home medical store where they can measure and give you the appropriate size compression sock.    Iregular menstrual cycle likely related to weight and progesterone only birth control can also cause some irregularity. To discuss evaluation of PCOS with gynecologist when sees them on the 25th. Hemoglobin A1c today did not show diabetes or  prediabetes. Further refills of progesterone only birth control to be done by gynecologist at the visit.    Hx of intermittent pinching left sided chest wall atypical chest pain off and on in the past. Last occurred in August 2021 when seen by an urgent care at [facility] and referred to cardiologist who did an EKG which was normal. Symptoms do not sound cardiac but recommended to get a stress test which did not get done. Is to call to reschedule that at [facility].   Continue Advair 1 puff twice a day preferably, currently using every other day with good symptom control. May bump up to recommended dosing when feeling more short of breath. Refilled Advair and albuterol today. Asthma action plan in place.    History of enlarged tonsils currently asymptomatic.  History of snoring but has been checked with a sleep evaluation done at [facility] in 2021 & was negative for obstructive sleep apnea. Try and avoid sleeping on your back is much as possible.    Family history of diabetes but currently hemoglobin A1c does not suggest you have diabetes.    History of subluxation left patella. Recommend quadriceps isometric strengthening exercises as demonstrated at visit today. Recommend ten sets twice a day. I suspect weight loss will also help. If no improvement or gets worse recommend follow-up with orthopedics    History of depression and anxiety stable off medications.  History of ADHD seen by community psychiatrist and given Ritalin. 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 Recommend follow-up with your psychiatrist as needed and can refer to a counselor if need more help.    Check in 6 months for asthma and in 1 year for preventive physical    Preventive Health Recommendations  Female Ages 26 - 39  Yearly exam:   See your health care provider every year in order to    Review health changes.     Discuss  preventive care.      Review your medicines if you your doctor has prescribed any.    Until age 30: Get a Pap test every three years (more often if you have had an abnormal result).    After age 30: Talk to your doctor about whether you should have a Pap test every 3 years or have a Pap test with HPV screening every 5 years.   You do not need a Pap test if your uterus was removed (hysterectomy) and you have not had cancer.  You should be tested each year for STDs (sexually transmitted diseases), if you're at risk.   Talk to your provider about how often to have your cholesterol checked.  If you are at risk for diabetes, you should have a diabetes test (fasting glucose).  Shots: Get a flu shot each year. Get a tetanus shot every 10 years.   Nutrition:     Eat at least 5 servings of fruits and vegetables each day.    Eat whole-grain bread, whole-wheat pasta and brown rice instead of white grains and rice.    Get adequate Calcium and Vitamin D.     Lifestyle    Exercise at least 150 minutes a week (30 minutes a day, 5 days of the week). This will help you control your weight and prevent disease.    Limit alcohol to one drink per day.    No smoking.     Wear sunscreen to prevent skin cancer.    See your dentist every six months for an exam and cleaning.

## 2022-01-18 NOTE — NURSING NOTE
Prior to immunization administration, verified patients identity using patient s name and date of birth. Please see Immunization Activity for additional information.     Screening Questionnaire for Adult Immunization    Are you sick today?   No   Do you have allergies to medications, food, a vaccine component or latex?   No   Have you ever had a serious reaction after receiving a vaccination?   No   Do you have a long-term health problem with heart, lung, kidney, or metabolic disease (e.g., diabetes), asthma, a blood disorder, no spleen, complement component deficiency, a cochlear implant, or a spinal fluid leak?  Are you on long-term aspirin therapy?   No   Do you have cancer, leukemia, HIV/AIDS, or any other immune system problem?   No   Do you have a parent, brother, or sister with an immune system problem?   No   In the past 3 months, have you taken medications that affect  your immune system, such as prednisone, other steroids, or anticancer drugs; drugs for the treatment of rheumatoid arthritis, Crohn s disease, or psoriasis; or have you had radiation treatments?   No   Have you had a seizure, or a brain or other nervous system problem?   No   During the past year, have you received a transfusion of blood or blood    products, or been given immune (gamma) globulin or antiviral drug?   No   For women: Are you pregnant or is there a chance you could become       pregnant during the next month?   No   Have you received any vaccinations in the past 4 weeks?   No     Immunization questionnaire answers were all negative.        Per orders of Dr. Pelayo, injection of ppsv 23 given by Diane Deleon. Patient instructed to remain in clinic for 15 minutes afterwards, and to report any adverse reaction to me immediately.       Screening performed by Diane Deleon on 1/18/2022 at 2:11 PM.

## 2022-01-18 NOTE — RESULT ENCOUNTER NOTE
Lyle Johnson,  Some of your results came back and are within acceptable limits. -Normal red blood cell (hgb) levels, normal white blood cell count and normal platelet levels. If you have any further concerns please do not hesitate to contact us by message, phone or making an appointment.  Have a good day   Dr Pierce XAVIER

## 2022-01-18 NOTE — LETTER
My Asthma Action Plan    Name: Yael Johnson   YOB: 1990  Date: 1/18/2022   My doctor: Angela Pelayo MD   My clinic: [facility]        My Control Medicine: Fluticasone propionate + salmeterol (Advair Diskus or Wixela Inhub) -  100/50 mcg 1 puff twice a day  My Rescue Medicine: Albuterol (Proair/Ventolin/Proventil HFA) 2-4 puffs EVERY 4 HOURS as needed. Use a spacer if recommended by your provider.   My Asthma Severity:   Mild Persistent  Know your asthma triggers: smoke, upper respiratory infections, emotions and cold air  smoke  upper respiratory infections            GREEN ZONE   Good Control    I feel good    No cough or wheeze    Can work, sleep and play without asthma symptoms       Take your asthma control medicine every day.     1. If exercise triggers your asthma, take your rescue medication    15 minutes before exercise or sports, and    During exercise if you have asthma symptoms  2. Spacer to use with inhaler: If you have a spacer, make sure to use it with your inhaler             YELLOW ZONE Getting Worse  I have ANY of these:    I do not feel good    Cough or wheeze    Chest feels tight    Wake up at night   1. Keep taking your Green Zone medications  2. Start taking your rescue medicine:    every 20 minutes for up to 1 hour. Then every 4 hours for 24-48 hours.  3. If you stay in the Yellow Zone for more than 12-24 hours, contact your doctor.  4. If you do not return to the Green Zone in 12-24 hours or you get worse, start taking your oral steroid medicine if prescribed by your provider.           RED ZONE Medical Alert - Get Help  I have ANY of these:    I feel awful    Medicine is not helping    Breathing getting harder    Trouble walking or talking    Nose opens wide to breathe       1. Take your rescue medicine NOW  2. If your provider has prescribed an oral steroid medicine, start taking it NOW  3. Call your doctor NOW  4. If you are still in the Red  Zone after 20 minutes and you have not reached your doctor:    Take your rescue medicine again and    Call 911 or go to the emergency room right away    See your regular doctor within 2 weeks of an Emergency Room or Urgent Care visit for follow-up treatment.          Annual Reminders:  Meet with Asthma Educator,  Flu Shot in the Fall, consider Pneumonia Vaccination for patients with asthma (aged 19 and older).    Pharmacy:    [place], [place] - [address]  [id number] - [place], [place] - [address]    Electronically signed by Angela Pelayo MD   Date: 01/18/22                      Asthma Triggers  How To Control Things That Make Your Asthma Worse    Triggers are things that make your asthma worse.  Look at the list below to help you find your triggers and what you can do about them.  You can help prevent asthma flare-ups by staying away from your triggers.      Trigger                                                          What you can do   Cigarette Smoke  Tobacco smoke can make asthma worse. Do not allow smoking in your home, car or around you.  Be sure no one smokes at a child s day care or school.  If you smoke, ask your health care provider for ways to help you quit.  Ask family members to quit too.  Ask your health care provider for a referral to Quit Plan to help you quit smoking, or call 4-488-240-PLAN.     Colds, Flu, Bronchitis  These are common triggers of asthma. Wash your hands often.  Don t touch your eyes, nose or mouth.  Get a flu shot every year.     Dust Mites  These are tiny bugs that live in cloth or carpet. They are too small to see. Wash sheets and blankets in hot water every week.   Encase pillows and mattress in dust mite proof covers.  Avoid having carpet if you can. If you have carpet, vacuum weekly.   Use a dust mask and HEPA vacuum.   Pollen and Outdoor Mold  Some people are allergic to trees, grass, or weed pollen, or molds. Try to  keep your windows closed.  Limit time out doors when pollen count is high.   Ask you health care provider about taking medicine during allergy season.     Animal Dander  Some people are allergic to skin flakes, urine or saliva from pets with fur or feathers. Keep pets with fur or feathers out of your home.    If you can t keep the pet outdoors, then keep the pet out of your bedroom.  Keep the bedroom door closed.  Keep pets off cloth furniture and away from stuffed toys.     Mice, Rats, and Cockroaches   Some people are allergic to the waste from these pests.   Cover food and garbage.  Clean up spills and food crumbs.  Store grease in the refrigerator.   Keep food out of the bedroom.   Indoor Mold  This can be a trigger if your home has high moisture. Fix leaking faucets, pipes, or other sources of water.   Clean moldy surfaces.  Dehumidify basement if it is damp and smelly.   Smoke, Strong Odors, and Sprays  These can reduce air quality. Stay away from strong odors and sprays, such as perfume, powder, hair spray, paints, smoke incense, paint, cleaning products, candles and new carpet.   Exercise or Sports  Some people with asthma have this trigger. Be active!  Ask your doctor about taking medicine before sports or exercise to prevent symptoms.    Warm up for 5-10 minutes before and after sports or exercise.     Other Triggers of Asthma  Cold air:  Cover your nose and mouth with a scarf.  Sometimes laughing or crying can be a trigger.  Some medicines and food can trigger asthma.

## 2022-01-18 NOTE — RESULT ENCOUNTER NOTE
Lyle Ms. Johnson,  Your results came back and are within acceptable limits.   -Liver and gallbladder tests are normal (ALT,AST, Alk phos, bilirubin), kidney function is normal (Cr, GFR), sodium is normal, potassium is normal, calcium is normal, glucose is normal.  -Vitamin D level is normal and getting 2000 IU daily in your diet or supplements is recommended.   -Ferritin (iron) level is normal.  If you have any further concerns please do not hesitate to contact us by message, phone or making an appointment.  Have a good day   Dr Pierce XAVIER

## 2022-01-19 ASSESSMENT — ANXIETY QUESTIONNAIRES: GAD7 TOTAL SCORE: 6

## 2022-01-19 ASSESSMENT — PATIENT HEALTH QUESTIONNAIRE - PHQ9: SUM OF ALL RESPONSES TO PHQ QUESTIONS 1-9: 7

## 2022-01-19 ASSESSMENT — ASTHMA QUESTIONNAIRES: ACT_TOTALSCORE: 24

## 2022-01-19 NOTE — RESULT ENCOUNTER NOTE
Lyle Ansari. Johnson,  Your results came back showing more than adequate VIt B12.  If you have any further concerns please do not hesitate to contact us by message, phone or making an appointment.  Have a good day   Dr Pierce XAVIER

## 2022-01-29 DIAGNOSIS — N92.6 IRREGULAR MENSTRUAL CYCLE: ICD-10-CM

## 2022-01-31 RX ORDER — ACETAMINOPHEN AND CODEINE PHOSPHATE 120; 12 MG/5ML; MG/5ML
SOLUTION ORAL
Qty: 84 TABLET | Refills: 2 | Status: SHIPPED | OUTPATIENT
Start: 2022-01-31 | End: 2022-06-17

## 2022-01-31 NOTE — TELEPHONE ENCOUNTER
Contraceptives Protocol Passed 01/29/2022 09:53 AM   Protocol Details  Patient is not a current smoker if age is 35 or older    Recent (12 mo) or future (30 days) visit within the authorizing provider's specialty    Medication is active on med list    No active pregnancy on record    No positive pregnancy test in past 12 months

## 2022-05-30 ENCOUNTER — NURSE TRIAGE (OUTPATIENT)
Dept: NURSING | Facility: CLINIC | Age: 32
End: 2022-05-30
Payer: COMMERCIAL

## 2022-05-30 ENCOUNTER — HOSPITAL ENCOUNTER (EMERGENCY)
Facility: CLINIC | Age: 32
Discharge: HOME | End: 2022-05-30
Attending: INTERNAL MEDICINE | Admitting: INTERNAL MEDICINE
Payer: COMMERCIAL

## 2022-05-30 VITALS
TEMPERATURE: 98 F | HEIGHT: 69 IN | RESPIRATION RATE: 16 BRPM | OXYGEN SATURATION: 97 % | BODY MASS INDEX: 41.32 KG/M2 | HEART RATE: 76 BPM | DIASTOLIC BLOOD PRESSURE: 82 MMHG | SYSTOLIC BLOOD PRESSURE: 136 MMHG | WEIGHT: 279 LBS

## 2022-05-30 DIAGNOSIS — T78.40XA ALLERGIC REACTION, INITIAL ENCOUNTER: ICD-10-CM

## 2022-05-30 LAB
ALBUMIN SERPL-MCNC: 3.8 G/DL (ref 3.4–5)
ALP SERPL-CCNC: 77 U/L (ref 40–150)
ALT SERPL W P-5'-P-CCNC: 63 U/L (ref 0–50)
ANION GAP SERPL CALCULATED.3IONS-SCNC: 8 MMOL/L (ref 3–14)
AST SERPL W P-5'-P-CCNC: 20 U/L (ref 0–45)
B-HCG SERPL-ACNC: <1 IU/L (ref 0–5)
BASOPHILS # BLD AUTO: 0.1 10E3/UL (ref 0–0.2)
BASOPHILS NFR BLD AUTO: 1 %
BILIRUB SERPL-MCNC: 0.2 MG/DL (ref 0.2–1.3)
BUN SERPL-MCNC: 17 MG/DL (ref 7–30)
CALCIUM SERPL-MCNC: 8.8 MG/DL (ref 8.5–10.1)
CHLORIDE BLD-SCNC: 109 MMOL/L (ref 94–109)
CO2 SERPL-SCNC: 22 MMOL/L (ref 20–32)
CREAT SERPL-MCNC: 0.81 MG/DL (ref 0.52–1.04)
CRP SERPL-MCNC: 14 MG/L (ref 0–8)
EOSINOPHIL # BLD AUTO: 0.3 10E3/UL (ref 0–0.7)
EOSINOPHIL NFR BLD AUTO: 3 %
ERYTHROCYTE [DISTWIDTH] IN BLOOD BY AUTOMATED COUNT: 14.4 % (ref 10–15)
ERYTHROCYTE [SEDIMENTATION RATE] IN BLOOD BY WESTERGREN METHOD: 13 MM/HR (ref 0–20)
GFR SERPL CREATININE-BSD FRML MDRD: >90 ML/MIN/1.73M2
GLUCOSE BLD-MCNC: 127 MG/DL (ref 70–99)
HCT VFR BLD AUTO: 43.8 % (ref 35–47)
HGB BLD-MCNC: 14 G/DL (ref 11.7–15.7)
HOLD SPECIMEN: NORMAL
IMM GRANULOCYTES # BLD: 0 10E3/UL
IMM GRANULOCYTES NFR BLD: 0 %
LYMPHOCYTES # BLD AUTO: 4 10E3/UL (ref 0.8–5.3)
LYMPHOCYTES NFR BLD AUTO: 39 %
MCH RBC QN AUTO: 26.8 PG (ref 26.5–33)
MCHC RBC AUTO-ENTMCNC: 32 G/DL (ref 31.5–36.5)
MCV RBC AUTO: 84 FL (ref 78–100)
MONOCYTES # BLD AUTO: 0.7 10E3/UL (ref 0–1.3)
MONOCYTES NFR BLD AUTO: 7 %
NEUTROPHILS # BLD AUTO: 5.3 10E3/UL (ref 1.6–8.3)
NEUTROPHILS NFR BLD AUTO: 50 %
NRBC # BLD AUTO: 0 10E3/UL
NRBC BLD AUTO-RTO: 0 /100
PLATELET # BLD AUTO: 250 10E3/UL (ref 150–450)
POTASSIUM BLD-SCNC: 3.6 MMOL/L (ref 3.4–5.3)
PROT SERPL-MCNC: 8.3 G/DL (ref 6.8–8.8)
RBC # BLD AUTO: 5.22 10E6/UL (ref 3.8–5.2)
SODIUM SERPL-SCNC: 139 MMOL/L (ref 133–144)
WBC # BLD AUTO: 10.3 10E3/UL (ref 4–11)

## 2022-05-30 PROCEDURE — 96366 THER/PROPH/DIAG IV INF ADDON: CPT

## 2022-05-30 PROCEDURE — 250N000011 HC RX IP 250 OP 636: Performed by: INTERNAL MEDICINE

## 2022-05-30 PROCEDURE — 99284 EMERGENCY DEPT VISIT MOD MDM: CPT | Performed by: INTERNAL MEDICINE

## 2022-05-30 PROCEDURE — 80053 COMPREHEN METABOLIC PANEL: CPT | Performed by: INTERNAL MEDICINE

## 2022-05-30 PROCEDURE — 84702 CHORIONIC GONADOTROPIN TEST: CPT | Performed by: INTERNAL MEDICINE

## 2022-05-30 PROCEDURE — 96365 THER/PROPH/DIAG IV INF INIT: CPT

## 2022-05-30 PROCEDURE — 96375 TX/PRO/DX INJ NEW DRUG ADDON: CPT

## 2022-05-30 PROCEDURE — 36415 COLL VENOUS BLD VENIPUNCTURE: CPT | Performed by: INTERNAL MEDICINE

## 2022-05-30 PROCEDURE — 85025 COMPLETE CBC W/AUTO DIFF WBC: CPT | Performed by: INTERNAL MEDICINE

## 2022-05-30 PROCEDURE — 99284 EMERGENCY DEPT VISIT MOD MDM: CPT | Mod: 25

## 2022-05-30 PROCEDURE — 85652 RBC SED RATE AUTOMATED: CPT | Performed by: INTERNAL MEDICINE

## 2022-05-30 PROCEDURE — 86140 C-REACTIVE PROTEIN: CPT | Performed by: INTERNAL MEDICINE

## 2022-05-30 RX ORDER — PREDNISONE 20 MG/1
40 TABLET ORAL DAILY
Qty: 10 TABLET | Refills: 0 | Status: SHIPPED | OUTPATIENT
Start: 2022-05-30 | End: 2022-06-04

## 2022-05-30 RX ORDER — EPINEPHRINE 0.3 MG/.3ML
0.3 INJECTION SUBCUTANEOUS
Qty: 1 EACH | Refills: 0 | Status: SHIPPED | OUTPATIENT
Start: 2022-05-30 | End: 2024-02-20

## 2022-05-30 RX ORDER — METHYLPREDNISOLONE SODIUM SUCCINATE 125 MG/2ML
125 INJECTION, POWDER, LYOPHILIZED, FOR SOLUTION INTRAMUSCULAR; INTRAVENOUS ONCE
Status: COMPLETED | OUTPATIENT
Start: 2022-05-30 | End: 2022-05-30

## 2022-05-30 RX ADMIN — FAMOTIDINE 20 MG: 20 INJECTION, SOLUTION INTRAVENOUS at 07:48

## 2022-05-30 RX ADMIN — METHYLPREDNISOLONE SODIUM SUCCINATE 125 MG: 125 INJECTION, POWDER, FOR SOLUTION INTRAMUSCULAR; INTRAVENOUS at 07:46

## 2022-05-30 ASSESSMENT — ENCOUNTER SYMPTOMS
EYE REDNESS: 0
ABDOMINAL PAIN: 0
CONFUSION: 0
ARTHRALGIAS: 0
DIFFICULTY URINATING: 0
SHORTNESS OF BREATH: 0
HEADACHES: 0
COLOR CHANGE: 0
NECK STIFFNESS: 0
FEVER: 0

## 2022-05-30 NOTE — TELEPHONE ENCOUNTER
Eyes are puffing up almost close and patient has diarrhea and vomited three times.  Patient ate some left over chicken.  Denies swollen tongue and denies troubles swallowing.  Patent's eyes are watering and she feels that she is getting worse.  Patient can barely see out of eyes.  Patient states that she is going to hand up and phone 911.  Not able to complete questions on covid.      COVID 19 Nurse Triage Plan/Patient Instructions    Please be aware that novel coronavirus (COVID-19) may be circulating in the community. If you develop symptoms such as fever, cough, or SOB or if you have concerns about the presence of another infection including coronavirus (COVID-19), please contact your health care provider or visit [email or web address].     Disposition/Instructions    ED Visit recommended. Follow protocol based instructions.     Bring Your Own Device:  Please also bring your smart device(s) (smart phones, tablets, laptops) and their charging cables for your personal use and to communicate with your care team during your visit.    Thank you for taking steps to prevent the spread of this virus.  o Limit your contact with others.  o Wear a simple mask to cover your cough.  o Wash your hands well and often.    Resources    [facility]: About COVID-19: [email or web address]    CDC: What to Do If You're Sick: www.cdc.gov/coronavirus/2019-ncov/about/steps-when-sick.html    CDC: Ending Home Isolation: www.cdc.gov/coronavirus/2019-ncov/hcp/disposition-in-home-patients.html     CDC: Caring for Someone: www.cdc.gov/coronavirus/2019-ncov/if-you-are-sick/care-for-someone.html     [facility]: Interim Guidance for Hospital Discharge to Home: www.health.[facility].mn.us/diseases/coronavirus/hcp/hospdischarge.pdf    [facility] clinical trials (COVID-19 research studies): [email or web address]     Below are the COVID-19 hotlines at the [place] Department of Health ([facility]).  Interpreters are available.   o For health questions: Call 943-492-3398 or 1-327.687.6252 (7 a.m. to 7 p.m.)  o For questions about schools and childcare: Call 074-802-5358 or 1-372.894.8765 (7 a.m. to 7 p.m.)                       Reason for Disposition    Other symptom of severe allergic reaction (Exception: Hives or facial swelling alone)    Additional Information    Negative: [1] Life-threatening reaction in the past to similar substance (e.g., food, insect bite/sting, medication, etc.) AND [2] < 2 hours since exposure    Negative: Wheezing, stridor, hoarseness, or difficulty breathing    Negative: [1] Tightness in the chest or throat AND [2] begins within 2 hours of exposure to allergic substance    Negative: Difficulty swallowing, drooling or slurred speech    Negative: Difficult to awaken or acting confused (e.g., disoriented, slurred speech)    Negative: Unresponsive, passed out or very weak    Protocols used: [id number]

## 2022-05-30 NOTE — ED TRIAGE NOTES
Pt arrives roman from work w/ c/o eyelid edema, n/v/d. Pt states she was on break ~0500 eating chicken and ice cream- endorses she has eaten this in the past, when symptoms started shortly thereafter. Took 50mg benadryl before calling EMS. Allergies to pet dander and copper. Denies pruritis, sob.

## 2022-05-30 NOTE — ED NOTES
Bed: ED17  Expected date:   Expected time:   Means of arrival:   Comments:  [place]  31F  Swollen Eyes, n/v

## 2022-05-30 NOTE — ED TRIAGE NOTES
Triage Assessment     Row Name 05/30/22 0620       Triage Assessment (Adult)    Airway WDL WDL       Respiratory WDL    Respiratory WDL WDL       Cardiac WDL    Cardiac WDL WDL

## 2022-05-30 NOTE — ED PROVIDER NOTES
"    [place] EMERGENCY DEPARTMENT ([facility])  5/30/22  History     Chief Complaint   Patient presents with     Facial Swelling     The history is provided by the patient and medical records.     Yael Johnson is a 31-year-old otherwise healthy female presents with nausea, vomiting, diarrhea, and swelling after chicken pizza she ate from [employer] that was 2 days old at around 5:15 AM. She was also on break from night shift.  She is currently not having nausea and denies any shortness of breath or mouth swelling or any rash.  She has never had something like this before. She gave herself Benadryl and reports her symptoms are somewhat better.      Past Medical History  Past Medical History:   Diagnosis Date     Abnormal uterine bleeding (AUB) 11/28/2019     Asthma      Enlarged tonsils      History of edema 10/23/2020     Leg fatigue 10/23/2020     Low ferritin 4/11/2019     Palpitations 4/11/2019     Uterine polyp 11/28/2019     Past Surgical History:   Procedure Laterality Date     BIOPSY  2018     GYN SURGERY  2019    Vaginal polyop     US BREAST BIOPSY LT  2017    benign, still has a lump     albuterol (PROAIR HFA/PROVENTIL HFA/VENTOLIN HFA) 108 (90 Base) MCG/ACT inhaler  albuterol (PROVENTIL) (2.5 MG/3ML) 0.083% neb solution  EPINEPHrine (ANY BX GENERIC EQUIV) 0.3 MG/0.3ML injection 2-pack  fluticasone-salmeterol (ADVAIR DISKUS) 100-50 MCG/DOSE inhaler  norethindrone (MICRONOR) 0.35 MG tablet  predniSONE (DELTASONE) 20 MG tablet  methylphenidate (RITALIN) 5 MG tablet      Allergies   Allergen Reactions     Cats      Copper Other (See Comments)     \"breakouts\"     Dogs      Family History  Family History   Problem Relation Age of Onset     Hypertension Mother      Diabetes Mother         type 2     Arthritis Mother      Bipolar Disorder Mother         per mom     Obesity Mother      Diabetes Maternal Grandmother         type 2     Cerebrovascular Disease Maternal Grandmother      Cardiovascular " "Paternal Grandfather         Lung     Social History   Social History     Tobacco Use     Smoking status: Never Smoker     Smokeless tobacco: Never Used   Substance Use Topics     Alcohol use: Yes     Comment: OCC     Drug use: No      Past medical history, past surgical history, medications, allergies, family history, and social history were reviewed with the patient. No additional pertinent items.     I have reviewed the Medications, Allergies, Past Medical and Surgical History, and Social History in the Epic system.    Review of Systems   Constitutional: Negative for fever.   HENT: Negative for congestion.    Eyes: Negative for redness.        Eye swelling   Respiratory: Negative for shortness of breath.    Cardiovascular: Negative for chest pain.   Gastrointestinal: Negative for abdominal pain.   Genitourinary: Negative for difficulty urinating.   Musculoskeletal: Negative for arthralgias and neck stiffness.   Skin: Negative for color change.   Allergic/Immunologic: Negative for environmental allergies, food allergies and immunocompromised state.   Neurological: Negative for headaches.   Psychiatric/Behavioral: Negative for confusion.     A complete review of systems was performed with pertinent positives and negatives noted in the HPI, and all other systems negative.    Physical Exam   BP: (!) 135/91  Pulse: 89  Temp: 98.2  F (36.8  C)  Resp: 16  Height: 175.3 cm (5' 9\")  Weight: 126.6 kg (279 lb)  SpO2: 99 %      Physical Exam  Vitals and nursing note reviewed.   Constitutional:       General: She is not in acute distress.     Appearance: She is not diaphoretic.   HENT:      Head: Atraumatic.      Mouth/Throat:      Pharynx: No oropharyngeal exudate.   Eyes:      General: No scleral icterus.     Extraocular Movements: Extraocular movements intact.      Conjunctiva/sclera: Conjunctivae normal.      Pupils: Pupils are equal, round, and reactive to light.     Cardiovascular:      Rate and Rhythm: Normal rate and " regular rhythm.      Heart sounds: Normal heart sounds. No murmur heard.    No friction rub. No gallop.   Pulmonary:      Effort: Pulmonary effort is normal. No respiratory distress.      Breath sounds: Normal breath sounds. No stridor. No wheezing, rhonchi or rales.   Chest:      Chest wall: No tenderness.   Abdominal:      General: Abdomen is flat. Bowel sounds are normal. There is no distension.      Palpations: Abdomen is soft. There is no mass.      Tenderness: There is no abdominal tenderness. There is no right CVA tenderness, left CVA tenderness, guarding or rebound.      Hernia: No hernia is present.   Musculoskeletal:         General: No tenderness.      Cervical back: Neck supple.   Skin:     General: Skin is warm.      Findings: No rash.   Neurological:      General: No focal deficit present.      Cranial Nerves: No cranial nerve deficit.         ED Course     Procedures        Results for orders placed or performed during the hospital encounter of 05/30/22 (from the past 24 hour(s))   [place] Draw    Narrative    The following orders were created for panel order [place] Draw.  Procedure                               Abnormality         Status                     ---------                               -----------         ------                     Extra Blue Top Tube[[id number]]                              Final result               Extra Red Top Tube[[id number]]                               Final result               Extra Green Top (Lithium...[[id number]]                      Final result               Extra Purple Top Tube[[id number]]                            Final result                 Please view results for these tests on the individual orders.   Extra Blue Top Tube   Result Value Ref Range    Hold Specimen JIC    Extra Red Top Tube   Result Value Ref Range    Hold Specimen JIC    Extra Green Top (Lithium Heparin) Tube   Result Value Ref Range    Hold Specimen JIC    Extra Purple Top Tube   Result  Value Ref Range    Hold Specimen [facility]    CBC with platelets differential    Narrative    The following orders were created for panel order CBC with platelets differential.  Procedure                               Abnormality         Status                     ---------                               -----------         ------                     CBC with platelets and d...[[id number]]  Abnormal            Final result                 Please view results for these tests on the individual orders.   Comprehensive metabolic panel   Result Value Ref Range    Sodium 139 133 - 144 mmol/L    Potassium 3.6 3.4 - 5.3 mmol/L    Chloride 109 94 - 109 mmol/L    Carbon Dioxide (CO2) 22 20 - 32 mmol/L    Anion Gap 8 3 - 14 mmol/L    Urea Nitrogen 17 7 - 30 mg/dL    Creatinine 0.81 0.52 - 1.04 mg/dL    Calcium 8.8 8.5 - 10.1 mg/dL    Glucose 127 (H) 70 - 99 mg/dL    Alkaline Phosphatase 77 40 - 150 U/L    AST 20 0 - 45 U/L    ALT 63 (H) 0 - 50 U/L    Protein Total 8.3 6.8 - 8.8 g/dL    Albumin 3.8 3.4 - 5.0 g/dL    Bilirubin Total 0.2 0.2 - 1.3 mg/dL    GFR Estimate >90 >60 mL/min/1.73m2   CRP inflammation   Result Value Ref Range    CRP Inflammation 14.0 (H) 0.0 - 8.0 mg/L   HCG quantitative pregnancy (blood)   Result Value Ref Range    hCG Quantitative <1 0 - 5 IU/L   CBC with platelets and differential   Result Value Ref Range    WBC Count 10.3 4.0 - 11.0 10e3/uL    RBC Count 5.22 (H) 3.80 - 5.20 10e6/uL    Hemoglobin 14.0 11.7 - 15.7 g/dL    Hematocrit 43.8 35.0 - 47.0 %    MCV 84 78 - 100 fL    MCH 26.8 26.5 - 33.0 pg    MCHC 32.0 31.5 - 36.5 g/dL    RDW 14.4 10.0 - 15.0 %    Platelet Count 250 150 - 450 10e3/uL    % Neutrophils 50 %    % Lymphocytes 39 %    % Monocytes 7 %    % Eosinophils 3 %    % Basophils 1 %    % Immature Granulocytes 0 %    NRBCs per 100 WBC 0 <1 /100    Absolute Neutrophils 5.3 1.6 - 8.3 10e3/uL    Absolute Lymphocytes 4.0 0.8 - 5.3 10e3/uL    Absolute Monocytes 0.7 0.0 - 1.3 10e3/uL    Absolute  Eosinophils 0.3 0.0 - 0.7 10e3/uL    Absolute Basophils 0.1 0.0 - 0.2 10e3/uL    Absolute Immature Granulocytes 0.0 <=0.4 10e3/uL    Absolute NRBCs 0.0 10e3/uL   Erythrocyte sedimentation rate auto   Result Value Ref Range    Erythrocyte Sedimentation Rate 13 0 - 20 mm/hr   Extra Tube    Narrative    The following orders were created for panel order Extra Tube.  Procedure                               Abnormality         Status                     ---------                               -----------         ------                     Extra Green Top (Lithium...[[id number]]                      Final result                 Please view results for these tests on the individual orders.   Extra Green Top (Lithium Heparin) Tube   Result Value Ref Range    Hold Specimen JI      Medications   famotidine (PEPCID) infusion 20 mg (0 mg Intravenous Stopped 5/30/22 0922)   methylPREDNISolone sodium succinate (solu-MEDROL) injection 125 mg (125 mg Intravenous Given 5/30/22 0746)      Assessments & Plan (with Medical Decision Making)    Allergic reaction with periorbital edema ?etiology-possibly old chicken, improving with benadryl pepcid and solumedrol, monitored without any airway issue or suggestion of anaphylaxis, will discharge with prednison and epi-pen, follow up with her PMD.         I have reviewed the nursing notes.    I have reviewed the findings, diagnosis, plan and need for follow up with the patient.    Discharge Medication List as of 5/30/2022  9:22 AM      START taking these medications    Details   EPINEPHrine (ANY BX GENERIC EQUIV) 0.3 MG/0.3ML injection 2-pack Inject 0.3 mLs (0.3 mg) into the muscle once as needed for anaphylaxis, Disp-1 each, R-0, Local Print      predniSONE (DELTASONE) 20 MG tablet Take 2 tablets (40 mg) by mouth daily for 5 days, Disp-10 tablet, R-0, Local Print             Final diagnoses:   Allergic reaction, initial encounter       Tyrese MARC am serving as a trained medical  scribe to document services personally performed by Johnny Timmons MD, based on the provider's statements to me.      I, Johnny Timmons MD, was physically present and have reviewed and verified the accuracy of this note documented by Tyrese Morris.     Johnny Timmons MD  5/30/2022   [facility] EMERGENCY DEPARTMENT     Johnny Timmons MD  05/30/22 2801

## 2022-06-17 ENCOUNTER — VIRTUAL VISIT (OUTPATIENT)
Dept: FAMILY MEDICINE | Facility: CLINIC | Age: 32
End: 2022-06-17
Payer: COMMERCIAL

## 2022-06-17 DIAGNOSIS — Z20.822 SUSPECTED COVID-19 VIRUS INFECTION: ICD-10-CM

## 2022-06-17 DIAGNOSIS — R07.0 THROAT PAIN: Primary | ICD-10-CM

## 2022-06-17 PROCEDURE — 99213 OFFICE O/P EST LOW 20 MIN: CPT | Mod: CS | Performed by: PHYSICIAN ASSISTANT

## 2022-06-17 NOTE — PROGRESS NOTES
Yael is a 31 year old who is being evaluated via a billable video visit.      How would you like to obtain your AVS? MyChart  If the video visit is dropped, the invitation should be resent by: Text to cell phone: [phone]  Will anyone else be joining your video visit? No    Assessment & Plan   Problem List Items Addressed This Visit    None     Visit Diagnoses     Throat pain    -  Primary    Relevant Orders    Symptomatic; Yes; 6/14/2022 COVID-19 Virus (Coronavirus) by PCR    Suspected COVID-19 virus infection        Relevant Orders    Streptococcus A Rapid Screen w/Reflex to PCR    Symptomatic; Yes; 6/14/2022 COVID-19 Virus (Coronavirus) by PCR         Impression is likely viral URI including COVID-19. Will order COVID-19 PCR and strep. She will do a home test in the meantime. Appears well and non-toxic and I have low suspicion for impending airway obstruction or respiratory distress.  She will push p.o. fluids, use over-the-counter meds for symptoms, and follow-up with us in 2 weeks if not improving or urgent care/the ER if symptoms worsen/change at any time. Fully vaccinated but not boosted.    DDx and Dx discussed with and explained to the pt to their satisfaction.  All questions were answered at this time. Pt expressed understanding of and agreement with this dx, tx, and plan. No further workup warranted and standard medication warnings given. I have given the patient a list of pertinent indications for re-evaluation. Will go to the Emergency Department if symptoms worsen or new concerning symptoms arise. Patient left the call in no apparent distress.      See Patient Instructions    Return in about 2 weeks (around 7/1/2022) for a recheck of your symptoms if not improving, or call 911/go to an ER anytime if worsening.    DELILAH Madrigal  [facility] Conley is a 31 year old presenting for the following health issues:  Ent Problem      HPI     Acute  Illness  Acute illness concerns: Sore throat, congestion, runny nose  Onset/Duration: 6/14/22. 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   Therapies tried and outcome: Robitussin    Review of Systems   Constitutional, HEENT, cardiovascular, pulmonary, gi and gu systems are negative, except as otherwise noted.      Objective           Vitals:  No vitals were obtained today due to virtual visit.    Physical Exam   GENERAL: Healthy, alert and no distress  HENT: No trismus.  EYES: Eyes grossly normal to inspection.  No discharge or erythema, or obvious scleral/conjunctival abnormalities.  RESP: No audible wheeze, cough, or visible cyanosis.  No visible retractions or increased work of breathing.    SKIN: Visible skin clear. No significant rash, abnormal pigmentation or lesions.  NEURO: Cranial nerves grossly intact.  Mentation and speech appropriate for age.  PSYCH: Mentation appears normal, affect normal/bright, judgement and insight intact, normal speech and appearance well-groomed.      Strep and COVID-19 PCR test pending.    Video-Visit Details    Video Start Time: 1:58 PM    Type of service:  Video Visit    Video End Time: 2:09 PM    Originating Location (pt. Location): Home    Distant Location (provider location):  [facility]     Platform used for Video Visit: [employer]  Tani

## 2022-06-17 NOTE — PATIENT INSTRUCTIONS
Paolo Conley,    Thank you for allowing [facility] to manage your care.    I am unsure of the cause of your symptoms, but your exam is reassuring. We will see what our workup shows.     If you develop worsening/changing symptoms at any time, please call 911 or go to the emergency department for evaluation.    Drink 8-10 glasses of fluid daily to stay well-hydrated.    For your pain, please use Ibuprofen 400mg four times daily with food. Between ibuprofen doses, you may use Tylenol 650mg.     Max acetaminophen (Tylenol) 4,000mg/24 hours  Max ibuprofen 3,200mg/24 hours    Please allow 1-2 business days for our office to contact you in regards to your laboratory/radiological studies.  If not done so, I encourage you to login into [employer] ([email or web address] to review your results as well.     If you have any questions or concerns, please feel free to call us at [phone]    Sincerely,    Vinicio Hanley PA-C    Did you know?      You can schedule a video visit for follow-up appointments as well as future appointments for certain conditions.  Please see the below link.     [email or web address]    If you have not already done so,  I encourage you to sign up for [employer] ([email or web address]).  This will allow you to review your results, securely communicate with a provider, and schedule virtual visits as well.

## 2022-06-23 NOTE — TELEPHONE ENCOUNTER
My computer does not let me print this.  Can you print and place on my desk and I can sign please?   Chart reviewed    Patient seen and examined    Agree with plan as outlined above    72 y.o. 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    Afib RVR, HTN, HLD  - Patient presenting with Afib RVR in the setting of covid  -HR 80-90s  - EKG shows Afib with RVR   - No meaningful evidence of volume overload.  - Previous TTE shows EF 60-65% on 7/2020  - TTE results pending   - continue cardizem 120mg for rate control  - Continue ASA 81mg and Rosuvastatin 10mg   -BP is stable  -can start to resume home medications as tolerated,   -initiated losartan low dose of 25mg po qd  - Eplerenone on hold  -hold hydralazine for now  -thromboembolism on eliquis bid Pt seen and examined, agree with above af rate controlled  no oxygen requirement  dc planning af rvr in the setting of covid  rate controlled  cont ac  no vol ol  dc planning Chart reviewed  Pt seen and examined  Agree with plan    72 y.o. 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    Afib RVR  - Afib RVR, likely, in the setting of Covid.  Now rate-controlled  - Can D/C tele  - EKG shows Afib with RVR, no ischemic changes  - No meaningful evidence of volume overload.  No O2 requirement  - TTE showed EF 60%, normal RVF, LAE, no significant valvular disease or pericardial effusion  - Continue Eliquis.  Continue Cardizem CD 120mg.  Uptitrate as necessary  - No c/o angina.  Continue ASA 81mg and statin   - BP is stable and controlled.  Continue Losartan 25 mg daily  - Monitor and replete lytes, keep K>4, Mg>2. No evidence of active ischemia or volume overload.  To follow closely while admitted. his bp has been very labile. agree with holding some meds so he can receive his AVN agents. Cont bb and ccb as tolerated. will use hydral as needed for sig HTN  cont covid rx.   Further cardiac workup will depend on clinical course. No signs of significant ischemia or volume overload. cont current care. Further cardiac workup will depend on clinical course. improved mental status  improved hr cont meds  prior echo normal ef, await repeat

## 2022-07-04 ENCOUNTER — TELEPHONE (OUTPATIENT)
Dept: FAMILY MEDICINE | Facility: CLINIC | Age: 32
End: 2022-07-04

## 2022-07-04 NOTE — TELEPHONE ENCOUNTER
Reason for call:  Other   Patient called regarding (reason for call): call back  Additional comments: Patient has been experiencing fatigue and irritability with her current birth control medication and would like to return to using her old type of birth control, norethindrone 0.35mg. Please advise.     Phone number to reach patient:  Home number on file [phone] (home)    Best Time:  Asap    Can we leave a detailed message on this number?  YES    Travel screening: Not Applicable

## 2022-07-06 NOTE — TELEPHONE ENCOUNTER
Routing refill request to provider for review/approval because:  --Please see previous documentation in this encounter for patient's request.  --I do not see any active OCP in this chart.  --Patient uses [id number] - [address]           --Last visit:  1/18/22 [facility] for CPE.    --Future Visit: 7/19/22 [facility] and 1/24/23 [facility].

## 2022-07-06 NOTE — TELEPHONE ENCOUNTER
Her gyn at [facility] has been managing her ocp / gyn health.please have her contact them

## 2022-07-27 ENCOUNTER — ALLIED HEALTH/NURSE VISIT (OUTPATIENT)
Dept: FAMILY MEDICINE | Facility: CLINIC | Age: 32
End: 2022-07-27
Payer: COMMERCIAL

## 2022-07-27 DIAGNOSIS — Z11.1 SCREENING EXAMINATION FOR PULMONARY TUBERCULOSIS: Primary | ICD-10-CM

## 2022-07-27 PROCEDURE — 99207 PR NO CHARGE NURSE ONLY: CPT

## 2022-07-27 PROCEDURE — 86580 TB INTRADERMAL TEST: CPT

## 2022-07-27 NOTE — PROGRESS NOTES
"  Patient is here today for a Mantoux (TST) test placement.    Is there a current order in the chart? No. Placed order according to standing order (reference the \"Skin Test- Tuberculosis Screening- Ambulatory Care\" standing order in [employer]). Review the Inclusion and Exclusion Criteria.          Inclusion Criteria  Pre-employment screening for healthcare workers and correctional facility staff - Administer two-step TST. Patient to return for second test in 1-3 weeks after first test is read.     Exclusion Criteria  None - Place order for Mantoux (TST) test per standing order.    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 No - form not needed per patient.    Instructed patient to wait for 15 minutes post injection and to report any reactions immediately to staff.    Told patient to return to clinic in 48-72 hours to have Mantoux (TST) read.             "

## 2022-07-29 ENCOUNTER — ALLIED HEALTH/NURSE VISIT (OUTPATIENT)
Dept: FAMILY MEDICINE | Facility: CLINIC | Age: 32
End: 2022-07-29
Payer: COMMERCIAL

## 2022-07-29 DIAGNOSIS — Z11.1 SCREENING EXAMINATION FOR PULMONARY TUBERCULOSIS: Primary | ICD-10-CM

## 2022-07-29 LAB
PPDINDURATION: 0 MM (ref 0–4.99)
PPDREDNESS: NORMAL

## 2022-07-29 PROCEDURE — 99207 PR NO CHARGE NURSE ONLY: CPT

## 2022-07-29 NOTE — PROGRESS NOTES
Patient is here today for a Mantoux (TST) test results.    Did patient return to clinic 48-72 hours from Mantoux (TST) placement:   Yes -     PPD Induration   Date Value Ref Range Status   07/29/2022 0 0 - 4.99 mm Final     PPD Redness   Date Value Ref Range Status   07/29/2022 Not Present  Final       Induration Size? Induration <5mm - Enter results in Enter/Edit Activity. Route results to ordering provider.     Patient needs form signed? No    Patient reports having previously had the BCG Vaccine: No    Does patient need a two step? No     Lurdes Dodd RN on 7/29/2022 at 3:38 PM

## 2022-10-01 ENCOUNTER — TELEPHONE (OUTPATIENT)
Dept: FAMILY MEDICINE | Facility: CLINIC | Age: 32
End: 2022-10-01

## 2022-10-01 NOTE — TELEPHONE ENCOUNTER
Reason for call:  Patient reporting a symptom    Symptom or request: Asthma complications requesting to be seen sooner    Duration (how long have symptoms been present): 5 days     Have you been treated for this before? Yes     Additional comments:     Phone Number patient can be reached at:  Home number on file [phone] (home)    Best Time:  afternoon    Can we leave a detailed message on this number:  YES    Call taken on 10/1/2022 at 1:13 AM by Estrellita Vasquez

## 2022-10-03 ENCOUNTER — MYC MEDICAL ADVICE (OUTPATIENT)
Dept: FAMILY MEDICINE | Facility: CLINIC | Age: 32
End: 2022-10-03

## 2022-10-03 NOTE — TELEPHONE ENCOUNTER
I called pt back,     She says she has been struggling with her asthma for the past week and this is not usual for her.    She was in ER 9/28 at [facility]- was recommended to follow-up with PCP in 1-3 days. Pt unable to get appt til 11/28.    Pt says she was concerned about going to sleep because of her breathing, this weekend she was experiencing shortness of breath even after taking inhalers and nebulizer.     Pt says she is feeling better today, no shortness of breath at rest this morning.     I asked pt if a covid test was done, she said no. She does have a test at home, I advised her to take this and mychart message us the result.     She is wondering if she can be seen sooner for ER follow-up ?   Dr. Pelayo- could we use your approval slot on 10/11 and advise  if pt does not continue to improve?    GUILLE VargasN RN  [facility]

## 2022-10-03 NOTE — TELEPHONE ENCOUNTER
I called and left detailed message stating appt time and date: 10/11 at 2:10 PM.     Also sending myChart message    GUILLE VargasN RN  [facility]

## 2022-10-04 NOTE — TELEPHONE ENCOUNTER
Patient verified her COVID test was negative.  She will take the appointment on 10/11/22 with Dr Pelayo.  Nina Costello RN  [facility]

## 2022-10-23 ENCOUNTER — NURSE TRIAGE (OUTPATIENT)
Dept: FAMILY MEDICINE | Facility: CLINIC | Age: 32
End: 2022-10-23

## 2022-10-23 NOTE — TELEPHONE ENCOUNTER
Reason for Call:  Other appointment    Detailed comments: Pt wants to request an appt for this week Thursday or Friday or sometime next week. Soonest with provider Dr. Pelayo is showing December.   Pt has been having exasperation of asthma & went into urgent care for this but symptoms are still present.     Phone Number Patient can be reached at: Cell number on file:    Telephone Information:   Mobile [phone]       Best Time: any    Can we leave a detailed message on this number? YES    Call taken on 10/23/2022 at 1:14 PM by Noemy Alaniz

## 2022-10-24 NOTE — TELEPHONE ENCOUNTER
Left message on patient's voicemail to call back and speak with a triage nurse.     Depending on symptoms, perhaps she could do a virtual visit with any avail provider since she was already evaluated for this in person in [facility]-- Radha has appts today.     Allison Whiting, GUILLEN RN  [facility]

## 2022-10-25 NOTE — TELEPHONE ENCOUNTER
Asthma feeling better now to pt. She is not having an asthma attack , now using ipratropium and albuterol neb    She is now using her rescue inhaler not even daily now, hasn't needed for 2 days    She just wants appt to discuss asthma control and was given next available which was Nov 10. She was ok with this    Pretty Issa, RN, BSN  [facility]

## 2022-11-20 ENCOUNTER — HEALTH MAINTENANCE LETTER (OUTPATIENT)
Age: 32
End: 2022-11-20

## 2023-01-14 ENCOUNTER — MYC MEDICAL ADVICE (OUTPATIENT)
Dept: FAMILY MEDICINE | Facility: CLINIC | Age: 33
End: 2023-01-14
Payer: COMMERCIAL

## 2023-01-14 DIAGNOSIS — Z59.71 INSURANCE COVERAGE PROBLEMS: Primary | ICD-10-CM

## 2023-01-17 DIAGNOSIS — J45.40 MODERATE PERSISTENT ASTHMA WITHOUT COMPLICATION: ICD-10-CM

## 2023-01-17 NOTE — TELEPHONE ENCOUNTER
Hi EDIL Nieves from patient regarding lapse in insurance coverage. Writer offered to connect with CC to discuss options.    GUILLE CavazosN, RN  [facility]

## 2023-01-19 RX ORDER — CEPHALEXIN 250 MG/1
CAPSULE ORAL
Qty: 60 EACH | Refills: 0 | Status: SHIPPED | OUTPATIENT
Start: 2023-01-19 | End: 2023-03-22

## 2023-01-19 NOTE — TELEPHONE ENCOUNTER
Medication is being filled for 1 time refill only due to:  Patient needs to be seen because due for follow-up visit and annual visit.    Last Written Prescription Date:  11/9/2022  Last Fill Quantity: 60,  # refills: 0   Last office visit: 1/18/2022 with prescribing provider:  Pierce Fuentes Office Visit:  None    Scheduling:   Please contact patient to schedule an annual preventative. Thank you.     GUILLE RemyN HUGO  [facility]

## 2023-01-20 NOTE — TELEPHONE ENCOUNTER
Pt response:  ---  Yes please! Thank you!  --  CC referral pended.  GLORIA Devine RN  [facility]

## 2023-01-23 ENCOUNTER — PATIENT OUTREACH (OUTPATIENT)
Dept: CARE COORDINATION | Facility: CLINIC | Age: 33
End: 2023-01-23
Payer: COMMERCIAL

## 2023-01-23 NOTE — PROGRESS NOTES
Clinic Care Coordination Contact  Community Health Worker Initial Outreach    Spoke with pt this morning:    Pt doesn't have insurance currently as she missed the deadline to get insurance through her work. She is starting a second job on 1/25/23 and will inquire if she can procure insurance through that job.    Pt is a student in nursing at [employer]    No other CC needs at this time    CHW Initial Information Gathering:  Referral Source: PCP  Current living arrangement:: Other (Has roommates)  Type of residence:: Apartment  Community Resources: None  Supplies Currently Used at Home: None  Equipment Currently Used at Home: none  Informal Support system:: Family (Mother and father)  No PCP office visit in Past Year: No  Transportation means:: Regular car, Public transportation ([employer], has license but no car)  CHW Additional Questions  If ED/Hospital discharge, follow-up appointment scheduled as recommended?: N/A  Medication changes made following ED/Hospital discharge?: N/A  MyChart active?: Yes  Patient sent Social Determinants of Health questionnaire?: Yes    Patient accepts CC: Yes. Patient scheduled for assessment with JAE Spears, on 1/27/23 at 2:00 pm. Patient noted desire to discuss getting medical insurance.     Judith Chavez, covering for Cadence Merida  Community Health Worker  [facility]  [phone]

## 2023-01-27 ENCOUNTER — PATIENT OUTREACH (OUTPATIENT)
Dept: NURSING | Facility: CLINIC | Age: 33
End: 2023-01-27
Payer: COMMERCIAL

## 2023-01-27 ASSESSMENT — ACTIVITIES OF DAILY LIVING (ADL): DEPENDENT_IADLS:: INDEPENDENT

## 2023-01-27 NOTE — LETTER
[facility] CARE COORDINATION  [address]  [place] [address]  January 27, 2023    Yael KO [place]  [address]  [place] [address]      Dear Yael,        I am a clinic care coordinator who works with Angela Pelayo MD with the [facility]. I wanted to thank you for spending the time to talk with me.  Below is a description of clinic care coordination and how I can further assist you.       The clinic care coordination team is made up of a registered nurse, , financial resource worker and community health worker who understand the health care system. The goal of clinic care coordination is to help you manage your health and improve access to the health care system. Our team works alongside your provider to assist you in determining your health and social needs. We can help you obtain health care and community resources, providing you with necessary information and education. We can work with you through any barriers and develop a care plan that helps coordinate and strengthen the communication between you and your care team.    Please feel free to contact me with any questions or concerns regarding care coordination and what we can offer.      We are focused on providing you with the highest-quality healthcare experience possible.    Sincerely,     COY Morrow   [facility] Primary [facility] - Clinic Care Coordination  [phone]      Enclosed: I have enclosed a copy of the Patient Centered Plan of Care. This has helpful information and goals that we have talked about. Please keep this in an easy to access place to use as needed.

## 2023-01-27 NOTE — PROGRESS NOTES
Clinic Care Coordination Contact    Clinic Care Coordination Contact  OUTREACH    Referral Information:  Referral Source: PCP    Primary Diagnosis: Psychosocial    Chief Complaint   Patient presents with     Clinic Care Coordination - Initial     SW        Universal Utilization:  CC called out to patient to complete initial assessment.   Clinic Utilization  Difficulty keeping appointments:: No  Compliance Concerns: No  No-Show Concerns: No  No PCP office visit in Past Year: No  Utilization    Hospital Admissions  0             ED Visits  1             No Show Count (past year)  1                Current as of: 1/23/2023 12:25 PM              Clinical Concerns:  Current Medical Concerns:    Patient Active Problem List   Diagnosis     Family history of diabetes mellitus     Enlarged tonsils     Moderate persistent asthma without complication     Morbid obesity (H)     Irregular menstrual cycle     JAYE (generalized anxiety disorder)     Major depressive disorder, remission status unspecified, unspecified whether recurrent     Subluxation of left patella, sequela     Venous incompetence     Attention deficit hyperactivity disorder (ADHD), predominantly inattentive type       Current Behavioral Concerns: None    Education Provided to patient: Role of CC provided      Health Maintenance Reviewed:    Clinical Pathway: None    Medication Management:  Medication review status: Medications reviewed and no changes reported per patient.           Functional Status:  Dependent ADLs:: Independent  Dependent IADLs:: Independent  Bed or wheelchair confined:: No  Mobility Status: Independent    Living Situation:  Current living arrangement:: Other (Has roommates)  Type of residence:: Apartment    Lifestyle & Psychosocial Needs:    Social Determinants of Health     Tobacco Use: Low Risk      Smoking Tobacco Use: Never     Smokeless Tobacco Use: Never     Passive Exposure: Not on file   Alcohol Use: Not on file   Financial Resource  Strain: Not on file   Food Insecurity: Not on file   Transportation Needs: Not on file   Physical Activity: Not on file   Stress: Not on file   Social Connections: Not on file   Intimate Partner Violence: Not on file   Depression: Not at risk     PHQ-2 Score: 0   Housing Stability: Not on file        Transportation means:: Regular car, Public transportation ([employer], has license but no car)     Mental health DX:: No  Chemical Dependency Status: No Current Concerns  Informal Support system:: Family (Mother and father)          SANDI ROWE chatted with patient over the phone regarding insurance    Patient shares in November when she was doing open enrollment she opted out of having insurance as she was confused about how much her employer was charging on her paystub    She shares she did not start working that job this week because it would not work in her schedule    Patient shares she is focusing on her one job    Discussed [employer]    Patient thinks it might be worth applying though she is aware open enrollment is closed for that too    SANDI ROWE and patient discussed that she wants to buy a car but is worried about her credit score    She shares she is workign on her score but would like information so she is prepared.      Resources and Interventions:  Current Resources:      Community Resources: None  Supplies Currently Used at Home: None  Equipment Currently Used at Home: none  Employment Status: employed full-time       RightsFlow  https://www.[employer]/new-customers/index.jsp    Tips on buying a car   https://www.bankrate.com/loans/auto-loans/top-car-buying-mistakes/    Interest rate tips  https://www.[employer]/car-loan/buying-a-new-car-when-interest-rates-are-high.html    https://www.[employer]/car-shopping/buying-a-car-tips-for-improving-your-interest-rate    Referrals Placed: Community Resources     Care Plan:  Care Plan: Financial Wellbeing     Problem: Patient expresses want for insurance      Goal: Create plan to obtain financial stability     Start Date: 1/27/2023 Expected End Date: 3/23/2023    Note:     Barriers: open enrollment has closed  Strengths: pt is open to options  Patient expressed understanding of goal: Yes  Action steps to achieve this goal:  1. I will look into and apply for [employer]  2. I will reach out to SW CC if I find I am not eligible to look at other options  3. I will keep in touch with care coordination                        Patient/Caregiver understanding: The patient indicates understanding of these issues and agrees with the plan.    Outreach Frequency: monthly  Future Appointments              In 1 month Angela Pelayo MD [facility]          Plan: CHW Delegation:  CHW to follow up on resources and goal  1)? Due Date:?  One month    CHW will involve Lead CC as needed or if patient is ready to move to maintenance.? Lead CC will continue to monitor CHW s monthly outreaches and progress to goal(s) every 6 weeks    COY Morrow   [facility] Primary [facility] - Clinic Care Coordination  [phone]

## 2023-01-27 NOTE — LETTER
[facility]  Patient Centered Plan of Care  About Me:        Patient Name:  Yael Vasquez    YOB: 1990  Age:         32 year old   Titus MRN:    [id number] Telephone Information:  Home Phone [phone]   Mobile [phone]       Address:  [address]  [place] [address] Email address:  [email or web address]      Emergency Contact(s)    Name Relationship Lgl Grd Work Phone Home Phone Mobile Phone   1. ROYA WEINSTEIN* Mother    [phone]   2. HARLEY VASQUEZ Father   [phone]           Primary language:  English     needed? No   [employer]:  [phone] op. 1  Other communication barriers:Lack of coping    Preferred Method of Communication:  Mail  Current living arrangement: Other (Has roommates)    Mobility Status/ Medical Equipment: Independent        Health Maintenance  Health Maintenance Reviewed: No data recorded    My Access Plan  Medical Emergency 911   Primary Clinic Line [facility] [phone]   24 Hour Appointment Line [phone] or  [phone]) (toll-free)   24 Hour Nurse Line [phone] (toll-free)   Preferred Urgent Care No data recorded   Preferred Hospital No data recorded   Preferred Pharmacy [facility] [place], [place] - [address]     Behavioral Health Crisis Line The National Suicide Prevention Lifeline at 1-883.851.8849 or Text/Call 608             My Care Team Members  Patient Care Team       Relationship Specialty Notifications Start End    Angela Pelayo MD PCP - General Family Practice  12/19/19     Phone: [phone] Fax: [phone]         [address] [place] [address]    Ernst Ruiz MD MD Family Practice  1/15/20     Phone: [phone] Fax: [phone]         [address] [place] [address]    Angela Pelayo MD Assigned PCP   1/17/21     Phone: [phone] Fax: [phone]         [address] [place] [address]    Cadence Merida MA Community Health Worker Primary Care - CC Admissions 1/23/23     Tory Canas Lead Care Coordinator  Admissions 1/27/23             My Care Plans  Self Management and Treatment Plan  Care Plan  Care Plan: Financial Wellbeing     Problem: Patient expresses want for insurance     Goal: Create plan to obtain financial stability     Start Date: 1/27/2023 Expected End Date: 3/23/2023    Note:     Barriers: open enrollment has closed  Strengths: pt is open to options  Patient expressed understanding of goal: Yes  Action steps to achieve this goal:  1. I will look into and apply for [employer]  2. I will reach out to [facility] if I find I am not eligible to look at other options  3. I will keep in touch with care coordination                         Action Plans on File:   Asthma        Depression          Advance Care Plans/Directives Type:   No data recorded    My Medical and Care Information  Problem List   Patient Active Problem List   Diagnosis     Family history of diabetes mellitus     Enlarged tonsils     Moderate persistent asthma without complication     Morbid obesity (H)     Irregular menstrual cycle     JAYE (generalized anxiety disorder)     Major depressive disorder, remission status unspecified, unspecified whether recurrent     Subluxation of left patella, sequela     Venous incompetence     Attention deficit hyperactivity disorder (ADHD), predominantly inattentive type      Current Medications and Allergies:  See printed Medication Report.    Care Coordination Start Date: 1/20/2023   Frequency of Care Coordination: monthly     Form Last Updated: 01/27/2023

## 2023-02-21 ENCOUNTER — PATIENT OUTREACH (OUTPATIENT)
Dept: CARE COORDINATION | Facility: CLINIC | Age: 33
End: 2023-02-21
Payer: COMMERCIAL

## 2023-02-21 NOTE — PROGRESS NOTES
Clinic Care Coordination Contact    Assessment - Patient chart reviewed by outgoing SANDI ROWE. SANDI ROWE removed self from case team and outreaches. Temporary SANDI, Savannah Anders added to cover.     Plan: SANDI ROWE to complete outreach as planned.    COY Morrow   [facility] Primary Care - Clinic Care Coordination  [phone]

## 2023-02-22 ENCOUNTER — NURSE TRIAGE (OUTPATIENT)
Dept: NURSING | Facility: CLINIC | Age: 33
End: 2023-02-22
Payer: COMMERCIAL

## 2023-02-22 NOTE — TELEPHONE ENCOUNTER
"\"I am an asthmatic and also allergic to cats.\"    Patient reporting she woke and took a Claritin and would like to know when it is safe to take a Benadryl?    Patient agrees to speak with pharmacy regarding dosing.    Triage was offered and patient declines. Stating she was looking for medication information only.    Jesika Lozano RN  [facility]    Reason for Disposition    [1] Caller has medicine question about med NOT prescribed by PCP AND [2] triager unable to answer question (e.g., compatibility with other med, storage)    Additional Information    Negative: [1] Intentional drug overdose AND [2] suicidal thoughts or ideas    Negative: Drug overdose and triager unable to answer question    Negative: Caller requesting a renewal or refill of a medicine patient is currently taking    Negative: Caller requesting information unrelated to medicine    Negative: Caller requesting information about COVID-19 Vaccine    Negative: Caller requesting information about Emergency Contraception    Negative: Caller requesting information about Combined Birth Control Pills    Negative: Caller requesting information about Progestin Birth Control Pills    Negative: Caller requesting information about Post-Op pain or medicines    Negative: Caller requesting a prescription antibiotic (such as Penicillin) for Strep throat and has a positive culture result    Negative: Caller requesting a prescription anti-viral med (such as Tamiflu) and has influenza (flu) symptoms    Negative: Immunization reaction suspected    Negative: Rash while taking a medicine or within 3 days of stopping it    Negative: [1] Asthma and [2] having symptoms of asthma (cough, wheezing, etc.)    Negative: [1] Symptom of illness (e.g., headache, abdominal pain, earache, vomiting) AND [2] more than mild    Negative: Breastfeeding questions about mother's medicines and diet    Negative: MORE THAN A DOUBLE DOSE of a prescription or over-the-counter (OTC) " drug    Negative: [1] DOUBLE DOSE (an extra dose or lesser amount) of prescription drug AND [2] any symptoms (e.g., dizziness, nausea, pain, sleepiness)    Negative: [1] DOUBLE DOSE (an extra dose or lesser amount) of over-the-counter (OTC) drug AND [2] any symptoms (e.g., dizziness, nausea, pain, sleepiness)    Negative: Took another person's prescription drug    Negative: [1] DOUBLE DOSE (an extra dose or lesser amount) of prescription drug AND [2] NO symptoms (Exception: a double dose of antibiotics)    Negative: Diabetes drug error or overdose (e.g., took wrong type of insulin or took extra dose)    Negative: [1] Prescription not at pharmacy AND [2] was prescribed by PCP recently (Exception: triager has access to EMR and prescription is recorded there. Go to Home Care and confirm for pharmacy.)    Negative: [1] Pharmacy calling with prescription question AND [2] triager unable to answer question    Negative: [1] Caller has URGENT medicine question about med that PCP or specialist prescribed AND [2] triager unable to answer question    Negative: Medicine patch causing local rash or itching    Protocols used: MEDICATION QUESTION CALL-A-

## 2023-02-28 ENCOUNTER — PATIENT OUTREACH (OUTPATIENT)
Dept: CARE COORDINATION | Facility: CLINIC | Age: 33
End: 2023-02-28
Payer: COMMERCIAL

## 2023-02-28 NOTE — PROGRESS NOTES
Clinic Care Coordination Contact  [facility]/Voicemail       Clinical Data: Care Coordinator Outreach  Outreach attempted x 1.  CHW briefly spoke with patient. Patient states that they are in the middle of moving and requested to be called back tomorrow. CHW acknowledged.     Plan: Care Coordinator will try to reach patient again in 1-2 business days.    KILEY Cabello, B.A. Critical access hospital Care Coordination  [facility]:   [facility]  [phone]

## 2023-03-02 NOTE — PROGRESS NOTES
Clinic Care Coordination Contact  [facility]/Voicemail       Clinical Data: Care Coordinator Outreach  Outreach attempted x 2.  CHW briefly spoke with patient. Patient states that she is currently at work and can't talk at the moment. Patient states that she will reach out to CHW when she is available.  Plan: Care Coordinator will wait for patient to call back. Care Coordinator set an outreach for 10 business days.     KILEY Cabello, B.A. [facility] Care Coordination  [facility]:   [facility]  [phone]

## 2023-03-13 ENCOUNTER — PATIENT OUTREACH (OUTPATIENT)
Dept: CARE COORDINATION | Facility: CLINIC | Age: 33
End: 2023-03-13
Payer: COMMERCIAL

## 2023-03-13 NOTE — PROGRESS NOTES
Care Coordination Clinician Chart Review    Situation: Patient chart reviewed by Care Coordinator.       Background: Care Coordination Program started: 1/20/2023. Initial assessment completed and patient-centered care plan(s) were developed with participation from patient. Lead CC handed patient off to CHW for continued outreaches.       Assessment: Per chart review, patient outreach completed by CC CHW on 2/28/23 Spoke briefly Pt will call CHW back.If no response CHW will reach out to pt. Again to review progress on goals.   Patient is actively working to accomplish goal(s). Patient's goal(s) appropriate and relevant at this time. Patient is not due for updated Plan of Care.  Assessments will be completed annually or as needed/with change of patient status.      Care Plan: Financial Wellbeing     Problem: Patient expresses want for insurance     Goal: Create plan to obtain financial stability     Start Date: 1/27/2023 Expected End Date: 3/23/2023    This Visit's Progress: 10%    Note:     Barriers: open enrollment has closed  Strengths: pt is open to options  Patient expressed understanding of goal: Yes  Action steps to achieve this goal:  1. I will look into and apply for [employer]  2. I will reach out to SW CC if I find I am not eligible to look at other options  3. I will keep in touch with care coordination                           Plan/Recommendations: The patient will continue working with Care Coordination to achieve goal(s) as above. CHW will continue outreaches at minimum every 30 days and will involve Lead CC as needed or if patient is ready to move to Maintenance. Lead CC will continue to monitor CHW outreaches and patient's progress to goal(s) every 6 weeks.     Plan of Care updated and sent to patient: COY Levin   [facility] Primary Care   Care Coordination  [facility]  3/13/2023 9:11 AM

## 2023-03-16 ENCOUNTER — PATIENT OUTREACH (OUTPATIENT)
Dept: CARE COORDINATION | Facility: CLINIC | Age: 33
End: 2023-03-16
Payer: COMMERCIAL

## 2023-03-16 NOTE — PROGRESS NOTES
Care Coordination Clinician Chart Review    Situation: Patient chart reviewed by Care Coordinator.       Background: Care Coordination Program started: 1/20/2023. Initial assessment completed and patient-centered care plan(s) were developed with participation from patient. Lead CC handed patient off to CHW for continued outreaches.       Assessment: Per chart review, patient outreach completed by CC CHW on 3/16/23   Patient is actively working to accomplish goal(s). Patient's goal(s)have been met. Will move to Maintenance status .   CHW will follow in 2 months.  Patient is not due for updated Plan of Care.  Assessments will be completed annually or as needed/with change of patient status.      Care Plan: Financial Wellbeing     Problem: Patient expresses want for insurance     Goal: Create plan to obtain financial stability  Completed 3/16/2023    Start Date: 1/27/2023 Expected End Date: 3/23/2023    Recent Progress: 10%    Note:     Barriers: open enrollment has closed  Strengths: pt is open to options  Patient expressed understanding of goal: Yes  Action steps to achieve this goal:  1. I will look into and apply for [employer] (patient states that she applied)  2. I will reach out to SW CC if I find I am not eligible to look at other options  3. I will keep in touch with care coordination                           Plan/Recommendations:  Goals have been met . CHW will continue outreaches at minimum every 2 months  and will involve Lead CC as needed.  Lead CC will continue to monitor CHW outreaches and and review in 2 months.  patient's s.     Plan of Care updated and sent to patient: COY Levin   [facility]   Care Coordination  [facility]  3/16/2023 11:33 AM

## 2023-03-16 NOTE — PROGRESS NOTES
Clinic Care Coordination Contact    Community Health Worker Follow Up    Care Gaps:     Health Maintenance Due   Topic Date Due     COVID-19 Vaccine (3 - Booster for Pfizer series) 10/14/2021     PHQ-9  07/18/2022     PAP  09/20/2022     YEARLY PREVENTIVE VISIT  01/18/2023     ASTHMA ACTION PLAN  01/18/2023     Pneumococcal Vaccine: Pediatrics (0 to 5 Years) and At-Risk Patients (6 to 64 Years) (2 - PCV) 01/18/2023       Postponed to next CHW outreach     Care Plan:   Care Plan: Financial Wellbeing     Problem: Patient expresses want for insurance     Goal: Create plan to obtain financial stability  Completed 3/16/2023    Start Date: 1/27/2023 Expected End Date: 3/23/2023    Recent Progress: 10%    Note:     Barriers: open enrollment has closed  Strengths: pt is open to options  Patient expressed understanding of goal: Yes  Action steps to achieve this goal:  1. I will look into and apply for [email or web address] (patient states that she applied)  2. I will reach out to [facility] if I find I am not eligible to look at other options  3. I will keep in touch with care coordination                        Intervention and Education during outreach:     Patient states that she has been doing good. Patient states that she looked into the resources that the [facility] provided her with and was able to purchase a car. Patient states that she also applied for [employer].    Patient states that she has no questions or concerns for CC at this time and accepted that the next CC outreach will be in two months. CHW encouraged patient to reach out to CC if anything comes up before then. Patient acknowledged.    CHW Plan: CHW will route patient to [facility] to review for maintenance.    KILEY Cabello, B.A. [facility] Care Coordination  [facility]:   [facility]  [phone]

## 2023-03-17 ENCOUNTER — TELEPHONE (OUTPATIENT)
Dept: FAMILY MEDICINE | Facility: CLINIC | Age: 33
End: 2023-03-17
Payer: COMMERCIAL

## 2023-03-17 DIAGNOSIS — J45.40 MODERATE PERSISTENT ASTHMA WITHOUT COMPLICATION: ICD-10-CM

## 2023-03-22 ENCOUNTER — MYC MEDICAL ADVICE (OUTPATIENT)
Dept: FAMILY MEDICINE | Facility: CLINIC | Age: 33
End: 2023-03-22
Payer: COMMERCIAL

## 2023-03-22 RX ORDER — FLUTICASONE PROPIONATE AND SALMETEROL 100; 50 UG/1; UG/1
1 POWDER RESPIRATORY (INHALATION) 2 TIMES DAILY
Qty: 60 EACH | Refills: 0 | Status: SHIPPED | OUTPATIENT
Start: 2023-03-22 | End: 2023-04-27

## 2023-03-22 NOTE — TELEPHONE ENCOUNTER
Dr. Pelayo-Please review, sign if agree and may close encounter.  Sydnie refill given and patient did not follow up.  Patient is scheduled with you on 5/15/23.    Last Written Prescription Date:  1/19/23  Last Fill Quantity: 60,  # refills: 0   Last office visit: 1/18/22 with prescribing provider:  Dr. Pelayo   Future Office Visit:        Thank you!  LANDY Gilbert, RN-[facility]      ACT Total Scores 1/18/2022 11/9/2022 12/8/2022   ACT TOTAL SCORE - - -   ASTHMA ER VISITS - - -   ASTHMA HOSPITALIZATIONS - - -   ACT TOTAL SCORE (Goal Greater than or Equal to 20) 24 17 21   In the past 12 months, how many times did you visit the emergency room for your asthma without being admitted to the hospital? 0 1 2   In the past 12 months, how many times were you hospitalized overnight because of your asthma? 0 0 0

## 2023-04-15 ENCOUNTER — HEALTH MAINTENANCE LETTER (OUTPATIENT)
Age: 33
End: 2023-04-15

## 2023-05-08 ENCOUNTER — ALLIED HEALTH/NURSE VISIT (OUTPATIENT)
Dept: INTERNAL MEDICINE | Facility: CLINIC | Age: 33
End: 2023-05-08
Payer: MEDICAID

## 2023-05-08 DIAGNOSIS — Z11.1 SCREENING FOR TUBERCULOSIS: Primary | ICD-10-CM

## 2023-05-08 PROCEDURE — 99207 PR NO CHARGE NURSE ONLY: CPT

## 2023-05-08 PROCEDURE — 86580 TB INTRADERMAL TEST: CPT

## 2023-05-10 ENCOUNTER — ALLIED HEALTH/NURSE VISIT (OUTPATIENT)
Dept: INTERNAL MEDICINE | Facility: CLINIC | Age: 33
End: 2023-05-10

## 2023-05-10 DIAGNOSIS — A15.9 TUBERCULOSIS: Primary | ICD-10-CM

## 2023-05-10 LAB
PPDINDURATION: 0 MM (ref 0–4.99)
PPDREDNESS: NORMAL

## 2023-05-10 PROCEDURE — 99207 PR NO CHARGE NURSE ONLY: CPT

## 2023-05-10 NOTE — PROGRESS NOTES
Patient is here today for a Mantoux (TST) test results.    Did patient return to clinic 48-72 hours from Mantoux (TST) placement:   Yes -     PPD Induration   Date Value Ref Range Status   07/29/2022 0 0 - 4.99 mm Final     PPD Redness   Date Value Ref Range Status   07/29/2022 Not Present  Final           Induration Size? Induration <5mm - Enter results in Enter/Edit Activity. Route results to ordering provider.     Patient needs form signed? Yes. Follow clinic form process.     Patient reports having previously had the BCG Vaccine: No    Does patient need a two step? No

## 2023-05-10 NOTE — RESULT ENCOUNTER NOTE
Triage helena Alvarenga Ms. Johnson,  I dont recall ordering this & not sure how it was ordered under my name but looks like was normal. Please discuss with the provider who ordered it for any concerns If you have any further concerns please do not hesitate to contact us by message, phone or making an appointment.  Have a good day   Dr Pierce XAVIER

## 2023-05-10 NOTE — PROGRESS NOTES
Patient is here today for a Mantoux (TST) test results.    Did patient return to clinic 48-72 hours from Mantoux (TST) placement:   Yes -     PPD Induration   Date Value Ref Range Status   05/10/2023 0 0 - 4.99 mm Final     PPD Redness   Date Value Ref Range Status   05/10/2023 Not Present  Final           Induration Size? Induration <5mm - Enter results in Enter/Edit Activity. Route results to ordering provider.     Patient needs form signed? No    Patient reports having previously had the BCG Vaccine: No    Does patient need a two step? No

## 2023-05-15 ENCOUNTER — PATIENT OUTREACH (OUTPATIENT)
Dept: CARE COORDINATION | Facility: CLINIC | Age: 33
End: 2023-05-15
Payer: MEDICAID

## 2023-05-15 NOTE — PROGRESS NOTES
Clinic Care Coordination Contact  [facility]/Voicemail       Clinical Data: Care Coordinator Outreach  Outreach attempted x 1.  Left message on patient's voicemail with call back information and requested return call.  Plan: Care Coordinator will try to reach patient again in 10 business days.    Cadence Merida CHW, B.A. [facility] Care Coordination  [facility]:   [facility]  [phone]

## 2023-05-16 ENCOUNTER — PATIENT OUTREACH (OUTPATIENT)
Dept: CARE COORDINATION | Facility: CLINIC | Age: 33
End: 2023-05-16
Payer: MEDICAID

## 2023-05-16 NOTE — PROGRESS NOTES
Care Coordination Clinician Chart Review    Situation: Patient chart reviewed by Care Coordinator.       Background: Care Coordination Program started: 1/20/2023. Initial assessment completed and patient-centered care plan(s) were developed with participation from patient. Lead CC handed patient off to CHW for continued outreaches.       Assessment: Per chart review, patient outreach completed by CC CHW on 5/15/23 Left message and CHW will follow up. Patient had met goals and moved to maintenance . . Patient is due for updated Plan of Care.  Assessments will be completed annually or as needed/with change of patient status.      Care Plan: Financial Wellbeing              Plan/Recommendations: The patient will continue working with Care Coordination  CHW will continue outreaches at minimum every 60 days if patient has been moved to Maintenance. s.     Plan of Care updated and sent to patient: Yes, via COY Munoz   [facility] Primary [facility]   Care Coordination  [facility]  5/16/2023 10:11 AM

## 2023-05-16 NOTE — LETTER
[facility]  Patient Centered Plan of Care  About Me:        Patient Name:  Yael Vasquez    YOB: 1990  Age:         32 year old   [place] MRN:    [id number] Telephone Information:  Home Phone [phone]   Mobile [phone]       Address:  [address] [place] [address] Email address:  [email or web address]      Emergency Contact(s)    Name Relationship Lgl Grd Work Phone Home Phone Mobile Phone   1. ROYA WEINSTEIN* Mother    [phone]   2. HARLEY VASQUEZ Father   [phone]           Primary language:  English     needed? No   [employer]:  [phone] op. 1  Other communication barriers:Lack of coping    Preferred Method of Communication:  Mail  Current living arrangement: Other    Mobility Status/ Medical Equipment: Independent        Health Maintenance  Health Maintenance Reviewed: Due/Overdue (yearly visit)      My Access Plan  Medical Emergency 911   Primary Clinic Line [facility] [phone]   24 Hour Appointment Line [phone] or  [phone]) (toll-free)   24 Hour Nurse Line [phone] (toll-free)   Preferred Urgent Care Other       Preferred Hospital [facility]  [phone]       Preferred Pharmacy [facility] [place], [place] - [facility] [address]     Behavioral Health Crisis Line The National Suicide Prevention Lifeline at 1-372.303.8157 or Text/Call 698             My Care Team Members  Patient Care Team         Relationship Specialty Notifications Start End    Angela Pelayo MD PCP - General Family Practice  12/19/19     Phone: [phone] Fax: [phone]         [address]  [place] [address]    Ernst Ruiz MD MD Family Practice  1/15/20     Phone: [phone] Fax: [phone]         [address] [place] [address]    Angela Pelayo MD Assigned PCP   1/17/21     Phone: [phone] Fax:  [phone]         [address] [facility] [address] [place] [address]    Cadence Merida MA Community Health Worker Primary Care - CC Admissions 1/23/23     Myriam Anders LSW Lead Care Coordinator Primary Care - CC Admissions 2/21/23               My Care Plans  Self Management and Treatment Plan  Care Plan  Care Plan: Financial Wellbeing              Action Plans on File:   Asthma        Depression          Advance Care Plans/Directives Type:   No data recorded    My Medical and Care Information  Problem List   Patient Active Problem List   Diagnosis    Family history of diabetes mellitus    Enlarged tonsils    Moderate persistent asthma without complication    Morbid obesity (H)    Irregular menstrual cycle    JAYE (generalized anxiety disorder)    Major depressive disorder, remission status unspecified, unspecified whether recurrent    Subluxation of left patella, sequela    Venous incompetence    Attention deficit hyperactivity disorder (ADHD), predominantly inattentive type      Current Medications and Allergies:  See printed Medication Report.    Care Coordination Start Date: 1/20/2023   Frequency of Care Coordination: 2 months       Form Last Updated: 05/16/2023

## 2023-06-02 ENCOUNTER — OFFICE VISIT (OUTPATIENT)
Dept: URGENT CARE | Facility: URGENT CARE | Age: 33
End: 2023-06-02
Payer: MEDICAID

## 2023-06-02 VITALS
BODY MASS INDEX: 44.18 KG/M2 | RESPIRATION RATE: 26 BRPM | TEMPERATURE: 97.6 F | WEIGHT: 293 LBS | SYSTOLIC BLOOD PRESSURE: 126 MMHG | HEART RATE: 95 BPM | DIASTOLIC BLOOD PRESSURE: 81 MMHG | OXYGEN SATURATION: 98 %

## 2023-06-02 DIAGNOSIS — J30.2 SEASONAL ALLERGIC RHINITIS, UNSPECIFIED TRIGGER: ICD-10-CM

## 2023-06-02 DIAGNOSIS — J45.901 EXACERBATION OF ASTHMA, UNSPECIFIED ASTHMA SEVERITY, UNSPECIFIED WHETHER PERSISTENT: Primary | ICD-10-CM

## 2023-06-02 PROCEDURE — 99203 OFFICE O/P NEW LOW 30 MIN: CPT | Performed by: PHYSICIAN ASSISTANT

## 2023-06-02 RX ORDER — METHYLPREDNISOLONE 4 MG
TABLET, DOSE PACK ORAL
Qty: 21 TABLET | Refills: 0 | Status: SHIPPED | OUTPATIENT
Start: 2023-06-02 | End: 2023-08-08

## 2023-06-02 RX ORDER — CETIRIZINE HYDROCHLORIDE 10 MG/1
10 TABLET ORAL EVERY EVENING
Qty: 30 TABLET | Refills: 0 | Status: SHIPPED | OUTPATIENT
Start: 2023-06-02 | End: 2023-06-24

## 2023-06-02 NOTE — LETTER
June 2, 2023      Jerilynrandal KO Johnson  [address]   [place] [address]        To Whom It May Concern:    Maxtimothy KO Johnson was seen in clinic today for illness.        Sincerely,        Eloina Rodriguez PA-C

## 2023-06-02 NOTE — PATIENT INSTRUCTIONS
(J45.901) Exacerbation of asthma, unspecified asthma severity, unspecified whether persistent  (primary encounter diagnosis)  Comment:   Plan: methylPREDNISolone (MEDROL DOSEPAK) 4 MG tablet        therapy pack            (J30.2) Seasonal allergic rhinitis, unspecified trigger  Comment:   Plan: cetirizine (ZYRTEC) 10 MG tablet        Take daily for the next month      Continue advair and albuterol inhalers.  Albuterol nebulizer as needed.     Follow up should symptoms persist or worsen.

## 2023-06-07 ENCOUNTER — PATIENT OUTREACH (OUTPATIENT)
Dept: CARE COORDINATION | Facility: CLINIC | Age: 33
End: 2023-06-07
Payer: MEDICAID

## 2023-06-07 NOTE — PROGRESS NOTES
Clinic Care Coordination Contact  [facility]/Voicemail       Clinical Data: Care Coordinator Outreach  Outreach attempted x 2.  Left message on patient's voicemail with call back information and requested return call.  Plan: Care Coordinator will route patient to [facility] to determine if further outreaches should be made.    Cadence Merida, CHW, B.A. [facility] Care Coordination  [facility]:   [facility]  [phone]

## 2023-06-08 ENCOUNTER — PATIENT OUTREACH (OUTPATIENT)
Dept: CARE COORDINATION | Facility: CLINIC | Age: 33
End: 2023-06-08
Payer: MEDICAID

## 2023-06-08 NOTE — LETTER
[facility] CARE COORDINATION  [place] CARE COORDINATION  [facility]  [address] [place] [address]  Phone: [phone]     June 8, 2023    Yael KO Johnson  [address]   [place] [address]      Dear Yael,    I have been unsuccessful in reaching you since our last contact. At this time the Care Coordination team will make no further attempts to reach you, however this does not change your ability to continue receiving care from your providers at your primary care clinic. If you need additional support from a care coordinator in the future please contact Javier Merida at [phone]    All of us at [facility] are invested in your health and are here to assist you in meeting your goals.     Sincerely,      COY Gurrola [facility] Primary Care   Care Coordination  [facility]  6/8/2023 8:38 AM

## 2023-06-08 NOTE — PROGRESS NOTES
Clinic Care Coordination Contact  Care Team Conversations     CHW had been unsuccessful at contacting patient to discuss graduation. She has been on maintenance.   SW Chart review shows no active goals and several appointment cancellations.     Plan:  At this time a Dis enrollment letter will be sent to patient via My Chart.  No further outreach planned.       COY Gurrola   [facility] Primary Care   Care Coordination  [facility]  6/8/2023 8:42 AM

## 2023-06-14 ENCOUNTER — ALLIED HEALTH/NURSE VISIT (OUTPATIENT)
Dept: INTERNAL MEDICINE | Facility: CLINIC | Age: 33
End: 2023-06-14
Payer: MEDICAID

## 2023-06-14 DIAGNOSIS — Z11.1 SCREENING FOR TUBERCULOSIS: Primary | ICD-10-CM

## 2023-06-14 PROCEDURE — 86580 TB INTRADERMAL TEST: CPT

## 2023-06-14 PROCEDURE — 99207 PR NO CHARGE NURSE ONLY: CPT

## 2023-06-14 NOTE — PROGRESS NOTES
"Patient is here today for a Mantoux (TST) test placement.    Is there a current order in the chart? No. Placed order according to standing order (reference the \"Skin Test- Tuberculosis Screening- Ambulatory Care\" standing order in [employer]). Review the Inclusion and Exclusion Criteria.          Inclusion Criteria  Pre-employment screening for healthcare workers and correctional facility staff - Administer two-step TST. Patient to return for second test in 1-3 weeks after first test is read.     Exclusion Criteria  None - Place order for Mantoux (TST) test per standing order.    Reason for Mantoux (TST) in patient's own words: work    Patient needs form signed? No - form not needed per patient.    Instructed patient to wait for 15 minutes post injection and to report any reactions immediately to staff.    Told patient to return to clinic in 48-72 hours to have Mantoux (TST) read.           Tomasa Singh MA    "

## 2023-07-09 DIAGNOSIS — J30.2 SEASONAL ALLERGIC RHINITIS, UNSPECIFIED TRIGGER: ICD-10-CM

## 2023-07-12 RX ORDER — CETIRIZINE HYDROCHLORIDE 10 MG/1
TABLET ORAL
Qty: 90 TABLET | Refills: 1 | OUTPATIENT
Start: 2023-07-12

## 2023-08-08 ENCOUNTER — OFFICE VISIT (OUTPATIENT)
Dept: FAMILY MEDICINE | Facility: CLINIC | Age: 33
End: 2023-08-08
Payer: COMMERCIAL

## 2023-08-08 VITALS
HEIGHT: 66 IN | HEART RATE: 81 BPM | BODY MASS INDEX: 47.09 KG/M2 | DIASTOLIC BLOOD PRESSURE: 74 MMHG | TEMPERATURE: 97.6 F | SYSTOLIC BLOOD PRESSURE: 118 MMHG | WEIGHT: 293 LBS | RESPIRATION RATE: 15 BRPM | OXYGEN SATURATION: 95 %

## 2023-08-08 DIAGNOSIS — I87.2 VENOUS INCOMPETENCE: ICD-10-CM

## 2023-08-08 DIAGNOSIS — Z71.89 ADVANCED DIRECTIVES, COUNSELING/DISCUSSION: ICD-10-CM

## 2023-08-08 DIAGNOSIS — F32.9 MAJOR DEPRESSIVE DISORDER, REMISSION STATUS UNSPECIFIED, UNSPECIFIED WHETHER RECURRENT: ICD-10-CM

## 2023-08-08 DIAGNOSIS — S83.002S SUBLUXATION OF LEFT PATELLA, SEQUELA: ICD-10-CM

## 2023-08-08 DIAGNOSIS — F41.1 GAD (GENERALIZED ANXIETY DISORDER): ICD-10-CM

## 2023-08-08 DIAGNOSIS — Z00.00 ROUTINE HISTORY AND PHYSICAL EXAMINATION OF ADULT: Primary | ICD-10-CM

## 2023-08-08 DIAGNOSIS — R60.9 DEPENDENT EDEMA: ICD-10-CM

## 2023-08-08 DIAGNOSIS — F90.0 ATTENTION DEFICIT HYPERACTIVITY DISORDER (ADHD), PREDOMINANTLY INATTENTIVE TYPE: ICD-10-CM

## 2023-08-08 DIAGNOSIS — E66.01 MORBID OBESITY (H): ICD-10-CM

## 2023-08-08 DIAGNOSIS — R06.83 SNORING: ICD-10-CM

## 2023-08-08 DIAGNOSIS — J35.1 ENLARGED TONSILS: ICD-10-CM

## 2023-08-08 DIAGNOSIS — Z00.00 HEALTH CARE MAINTENANCE: ICD-10-CM

## 2023-08-08 DIAGNOSIS — Z23 NEED FOR PNEUMOCOCCAL VACCINATION: ICD-10-CM

## 2023-08-08 DIAGNOSIS — N92.6 IRREGULAR MENSTRUAL CYCLE: ICD-10-CM

## 2023-08-08 DIAGNOSIS — Z23 NEED FOR COVID-19 VACCINE: ICD-10-CM

## 2023-08-08 DIAGNOSIS — Z88.9 HX OF ALLERGIC REACTION: ICD-10-CM

## 2023-08-08 DIAGNOSIS — J45.40 MODERATE PERSISTENT ASTHMA WITHOUT COMPLICATION: ICD-10-CM

## 2023-08-08 DIAGNOSIS — Z83.3 FAMILY HISTORY OF DIABETES MELLITUS: ICD-10-CM

## 2023-08-08 LAB
ALBUMIN SERPL BCG-MCNC: 4.4 G/DL (ref 3.5–5.2)
ALP SERPL-CCNC: 69 U/L (ref 35–104)
ALT SERPL W P-5'-P-CCNC: 10 U/L (ref 0–50)
ANION GAP SERPL CALCULATED.3IONS-SCNC: 9 MMOL/L (ref 7–15)
AST SERPL W P-5'-P-CCNC: 19 U/L (ref 0–45)
BASOPHILS # BLD AUTO: 0 10E3/UL (ref 0–0.2)
BASOPHILS NFR BLD AUTO: 0 %
BILIRUB SERPL-MCNC: 0.4 MG/DL
BUN SERPL-MCNC: 12.5 MG/DL (ref 6–20)
CALCIUM SERPL-MCNC: 9.1 MG/DL (ref 8.6–10)
CHLORIDE SERPL-SCNC: 106 MMOL/L (ref 98–107)
CHOLEST SERPL-MCNC: 119 MG/DL
CREAT SERPL-MCNC: 0.75 MG/DL (ref 0.51–0.95)
DEPRECATED HCO3 PLAS-SCNC: 23 MMOL/L (ref 22–29)
EOSINOPHIL # BLD AUTO: 0.2 10E3/UL (ref 0–0.7)
EOSINOPHIL NFR BLD AUTO: 3 %
ERYTHROCYTE [DISTWIDTH] IN BLOOD BY AUTOMATED COUNT: 15.2 % (ref 10–15)
GFR SERPL CREATININE-BSD FRML MDRD: >90 ML/MIN/1.73M2
GLUCOSE SERPL-MCNC: 92 MG/DL (ref 70–99)
HBA1C MFR BLD: 5.6 % (ref 0–5.6)
HCT VFR BLD AUTO: 44.8 % (ref 35–47)
HDLC SERPL-MCNC: 34 MG/DL
HGB BLD-MCNC: 13.6 G/DL (ref 11.7–15.7)
IMM GRANULOCYTES # BLD: 0 10E3/UL
IMM GRANULOCYTES NFR BLD: 0 %
LDLC SERPL CALC-MCNC: 77 MG/DL
LYMPHOCYTES # BLD AUTO: 1.9 10E3/UL (ref 0.8–5.3)
LYMPHOCYTES NFR BLD AUTO: 25 %
MCH RBC QN AUTO: 25.6 PG (ref 26.5–33)
MCHC RBC AUTO-ENTMCNC: 30.4 G/DL (ref 31.5–36.5)
MCV RBC AUTO: 84 FL (ref 78–100)
MONOCYTES # BLD AUTO: 0.5 10E3/UL (ref 0–1.3)
MONOCYTES NFR BLD AUTO: 7 %
NEUTROPHILS # BLD AUTO: 5 10E3/UL (ref 1.6–8.3)
NEUTROPHILS NFR BLD AUTO: 65 %
NONHDLC SERPL-MCNC: 85 MG/DL
NRBC # BLD AUTO: 0 10E3/UL
NRBC BLD AUTO-RTO: 0 /100
PLATELET # BLD AUTO: 243 10E3/UL (ref 150–450)
POTASSIUM SERPL-SCNC: 4.2 MMOL/L (ref 3.4–5.3)
PROT SERPL-MCNC: 7.8 G/DL (ref 6.4–8.3)
RBC # BLD AUTO: 5.32 10E6/UL (ref 3.8–5.2)
SODIUM SERPL-SCNC: 138 MMOL/L (ref 136–145)
TRIGL SERPL-MCNC: 40 MG/DL
TSH SERPL DL<=0.005 MIU/L-ACNC: 0.83 UIU/ML (ref 0.3–4.2)
WBC # BLD AUTO: 7.8 10E3/UL (ref 4–11)

## 2023-08-08 PROCEDURE — 85025 COMPLETE CBC W/AUTO DIFF WBC: CPT | Performed by: FAMILY MEDICINE

## 2023-08-08 PROCEDURE — 36415 COLL VENOUS BLD VENIPUNCTURE: CPT | Performed by: FAMILY MEDICINE

## 2023-08-08 PROCEDURE — 80061 LIPID PANEL: CPT | Performed by: FAMILY MEDICINE

## 2023-08-08 PROCEDURE — 84443 ASSAY THYROID STIM HORMONE: CPT | Performed by: FAMILY MEDICINE

## 2023-08-08 PROCEDURE — 99395 PREV VISIT EST AGE 18-39: CPT | Mod: 25 | Performed by: FAMILY MEDICINE

## 2023-08-08 PROCEDURE — 80053 COMPREHEN METABOLIC PANEL: CPT | Performed by: FAMILY MEDICINE

## 2023-08-08 PROCEDURE — 83036 HEMOGLOBIN GLYCOSYLATED A1C: CPT | Performed by: FAMILY MEDICINE

## 2023-08-08 PROCEDURE — 99214 OFFICE O/P EST MOD 30 MIN: CPT | Mod: 25 | Performed by: FAMILY MEDICINE

## 2023-08-08 RX ORDER — BUDESONIDE AND FORMOTEROL FUMARATE DIHYDRATE 160; 4.5 UG/1; UG/1
AEROSOL RESPIRATORY (INHALATION)
Qty: 20.4 G | Refills: 11 | Status: SHIPPED | OUTPATIENT
Start: 2023-08-08 | End: 2023-10-23

## 2023-08-08 RX ORDER — EPINEPHRINE 0.3 MG/.3ML
0.3 INJECTION SUBCUTANEOUS
Qty: 1 EACH | Refills: 0 | Status: CANCELLED | OUTPATIENT
Start: 2023-08-08

## 2023-08-08 RX ORDER — FLUTICASONE PROPIONATE AND SALMETEROL 100; 50 UG/1; UG/1
1 POWDER RESPIRATORY (INHALATION) 2 TIMES DAILY
Qty: 1 EACH | Refills: 0 | Status: CANCELLED | OUTPATIENT
Start: 2023-08-08

## 2023-08-08 RX ORDER — ALBUTEROL SULFATE 90 UG/1
2 AEROSOL, METERED RESPIRATORY (INHALATION) EVERY 6 HOURS PRN
Qty: 8.5 G | Refills: 0 | Status: CANCELLED | OUTPATIENT
Start: 2023-08-08

## 2023-08-08 ASSESSMENT — ENCOUNTER SYMPTOMS
COUGH: 0
HEADACHES: 0
JOINT SWELLING: 0
CHILLS: 0
EYE PAIN: 0
BREAST MASS: 0
SHORTNESS OF BREATH: 0
ABDOMINAL PAIN: 0
PALPITATIONS: 0
WEAKNESS: 0
FREQUENCY: 0
SORE THROAT: 0
HEMATOCHEZIA: 0
HEMATURIA: 0
FEVER: 0
PARESTHESIAS: 0
NAUSEA: 0
DIZZINESS: 0
DIARRHEA: 0
MYALGIAS: 0
NERVOUS/ANXIOUS: 1
ARTHRALGIAS: 0
DYSURIA: 0
CONSTIPATION: 0
HEARTBURN: 0

## 2023-08-08 ASSESSMENT — PAIN SCALES - GENERAL: PAINLEVEL: NO PAIN (0)

## 2023-08-08 ASSESSMENT — ANXIETY QUESTIONNAIRES
IF YOU CHECKED OFF ANY PROBLEMS ON THIS QUESTIONNAIRE, HOW DIFFICULT HAVE THESE PROBLEMS MADE IT FOR YOU TO DO YOUR WORK, TAKE CARE OF THINGS AT HOME, OR GET ALONG WITH OTHER PEOPLE: SOMEWHAT DIFFICULT
GAD7 TOTAL SCORE: 13
5. BEING SO RESTLESS THAT IT IS HARD TO SIT STILL: NOT AT ALL
2. NOT BEING ABLE TO STOP OR CONTROL WORRYING: MORE THAN HALF THE DAYS
6. BECOMING EASILY ANNOYED OR IRRITABLE: MORE THAN HALF THE DAYS
4. TROUBLE RELAXING: SEVERAL DAYS
GAD7 TOTAL SCORE: 13
1. FEELING NERVOUS, ANXIOUS, OR ON EDGE: MORE THAN HALF THE DAYS
3. WORRYING TOO MUCH ABOUT DIFFERENT THINGS: NEARLY EVERY DAY
7. FEELING AFRAID AS IF SOMETHING AWFUL MIGHT HAPPEN: NEARLY EVERY DAY

## 2023-08-08 ASSESSMENT — ASTHMA QUESTIONNAIRES: ACT_TOTALSCORE: 21

## 2023-08-08 ASSESSMENT — PATIENT HEALTH QUESTIONNAIRE - PHQ9
10. IF YOU CHECKED OFF ANY PROBLEMS, HOW DIFFICULT HAVE THESE PROBLEMS MADE IT FOR YOU TO DO YOUR WORK, TAKE CARE OF THINGS AT HOME, OR GET ALONG WITH OTHER PEOPLE: NOT DIFFICULT AT ALL
SUM OF ALL RESPONSES TO PHQ QUESTIONS 1-9: 3
SUM OF ALL RESPONSES TO PHQ QUESTIONS 1-9: 3

## 2023-08-08 NOTE — LETTER
My Asthma Action Plan    Name: Yael Johnson   YOB: 1990  Date: 8/8/2023   My doctor: Angela Pelayo MD   My clinic: [facility]        My Control Medicine: Budesonide + formoterol (Symbicort HFA) -  160/4.5 mcg 2 puffs twice a day and 1 to 2 puffs as needed for flareup up to maximum 12 puffs in a 24-hour timeframe  My Rescue Medicine:  Symbicort 1 to 2 puffs as needed   My Asthma Severity:   Moderate Persistent  Know your asthma triggers:   smoke  upper respiratory infections            GREEN ZONE   Good Control  I feel good  No cough or wheeze  Can work, sleep and play without asthma symptoms       Take your asthma control medicine every day.     If exercise triggers your asthma, take your rescue medication  15 minutes before exercise or sports, and  During exercise if you have asthma symptoms  Spacer to use with inhaler: If you have a spacer, make sure to use it with your inhaler             YELLOW ZONE Getting Worse  I have ANY of these:  I do not feel good  Cough or wheeze  Chest feels tight  Wake up at night   Keep taking your Green Zone medications  Start taking your rescue medicine:  every 20 minutes for up to 1 hour. Then every 4 hours for 24-48 hours.  If you stay in the Yellow Zone for more than 12-24 hours, contact your doctor.  If you do not return to the Green Zone in 12-24 hours or you get worse, start taking your oral steroid medicine if prescribed by your provider.           RED ZONE Medical Alert - Get Help  I have ANY of these:  I feel awful  Medicine is not helping  Breathing getting harder  Trouble walking or talking  Nose opens wide to breathe       Take your rescue medicine NOW  If your provider has prescribed an oral steroid medicine, start taking it NOW  Call your doctor NOW  If you are still in the Red Zone after 20 minutes and you have not reached your doctor:  Take your rescue medicine again and  Call 911 or go to the emergency room right  away    See your regular doctor within 2 weeks of an Emergency Room or Urgent Care visit for follow-up treatment.          Annual Reminders:  Meet with Asthma Educator,  Flu Shot in the Fall, consider Pneumonia Vaccination for patients with asthma (aged 19 and older).    Pharmacy:    [facility] [place] [address] [address]  [id number] - [place], [place] - [address]   [id number] - [place], [place] - [address]  [id number] [address] AT [facility]    Electronically signed by Angela Pelayo MD   Date: 08/08/23                      Asthma Triggers  How To Control Things That Make Your Asthma Worse    Triggers are things that make your asthma worse.  Look at the list below to help you find your triggers and what you can do about them.  You can help prevent asthma flare-ups by staying away from your triggers.      Trigger                                                          What you can do   Cigarette Smoke  Tobacco smoke can make asthma worse. Do not allow smoking in your home, car or around you.  Be sure no one smokes at a child s day care or school.  If you smoke, ask your health care provider for ways to help you quit.  Ask family members to quit too.  Ask your health care provider for a referral to Quit Plan to help you quit smoking, or call 8-823-468-PLAN.     Colds, Flu, Bronchitis  These are common triggers of asthma. Wash your hands often.  Don t touch your eyes, nose or mouth.  Get a flu shot every year.     Dust Mites  These are tiny bugs that live in cloth or carpet. They are too small to see. Wash sheets and blankets in hot water every week.   Encase pillows and mattress in dust mite proof covers.  Avoid having carpet if you can. If you have carpet, vacuum weekly.   Use a dust mask and HEPA vacuum.   Pollen and Outdoor Mold  Some people are allergic to trees, grass, or weed pollen, or molds. Try to  keep your windows closed.  Limit time out doors when pollen count is high.   Ask you health care provider about taking medicine during allergy season.     Animal Dander  Some people are allergic to skin flakes, urine or saliva from pets with fur or feathers. Keep pets with fur or feathers out of your home.    If you can t keep the pet outdoors, then keep the pet out of your bedroom.  Keep the bedroom door closed.  Keep pets off cloth furniture and away from stuffed toys.     Mice, Rats, and Cockroaches   Some people are allergic to the waste from these pests.   Cover food and garbage.  Clean up spills and food crumbs.  Store grease in the refrigerator.   Keep food out of the bedroom.   Indoor Mold  This can be a trigger if your home has high moisture. Fix leaking faucets, pipes, or other sources of water.   Clean moldy surfaces.  Dehumidify basement if it is damp and smelly.   Smoke, Strong Odors, and Sprays  These can reduce air quality. Stay away from strong odors and sprays, such as perfume, powder, hair spray, paints, smoke incense, paint, cleaning products, candles and new carpet.   Exercise or Sports  Some people with asthma have this trigger. Be active!  Ask your doctor about taking medicine before sports or exercise to prevent symptoms.    Warm up for 5-10 minutes before and after sports or exercise.     Other Triggers of Asthma  Cold air:  Cover your nose and mouth with a scarf.  Sometimes laughing or crying can be a trigger.  Some medicines and food can trigger asthma.

## 2023-08-08 NOTE — PROGRESS NOTES
SUBJECTIVE:   CC: Yael is an 32 year old who presents for preventive health visit.       8/8/2023    11:30 AM   Additional Questions   Roomed by Lynn Patton     Healthy Habits:     Getting at least 3 servings of Calcium per day:  NO    Bi-annual eye exam:  Yes    Dental care twice a year:  NO    Sleep apnea or symptoms of sleep apnea:  None and Excessive snoring    Diet:  Regular (no restrictions)    Frequency of exercise:  None    Taking medications regularly:  Yes    Medication side effects:  None    Additional concerns today:  Yes  Today's PHQ-9 Score:       8/8/2023    10:57 AM   PHQ-9 SCORE   PHQ-9 Total Score MyChart 3 (Minimal depression)   PHQ-9 Total Score 3   Answers submitted by the patient for this visit:  Patient Health Questionnaire (Submitted on 8/8/2023)  If you checked off any problems, how difficult have these problems made it for you to do your work, take care of things at home, or get along with other people?: Not difficult at all  PHQ9 TOTAL SCORE: 3  AJYE-7 (Submitted on 8/8/2023)  JAYE 7 TOTAL SCORE: 13    BACKGROUND  Hx of morbid obesity, BMI > 49, snoring ( reported sleep study neg 2021 In [facility]),enlarged tonsils, moderate persistent asthma prev on albuterol inhaler & neb prn & Advair 100/50 bid, allergic to cats & dogs, FH of diabetes, venous incompetence & dependant edema, prior left breast biopsy that was benign & still had  a lump in 2017, Prior history of left breast fibroadenoma 5 o'clock position 3 cm from nipple s/p an ultrasound-guided core biopsy which did not show any cancer or abnormal cells and no further follow-up needed for that, with hx of irregular menstrual cycle no longer on nuvaring, AUB on Micronor with mild improvement, u/S by gn outside [place] showed a endometrial polyp, low ferritin, palpitations, hx of JAYE, MDD, hx of SI, ADHD, seen by psyche in the past given ritalin did not tolerate, hx of a lot of cancellations & no shows, left patella subluxation,    Previously under care of PCP Wiley, Seen first time by this writer on 18 for vaginal odor & treated for BV with Metrogel. Std screen was negative. Had left knee pain, exam was benign & advised quad strengthening , weight loss & referred to ENT for enlarged tonsils. Cbc, CMP, lipids, TSH, hep B,C, HIV , syphilis & GC were negative. Seen 19 by Beto for sore throat , no infection seen. GC oral was negative. Was given nuvaring,  Advair dose increased but not started, remains stable on prior lower dosing.    Seen 3/8/19 for palpitations, aware of it since the middle of 2018. Symptoms started the day her brother in law  of a heart attack  & thought maybe was anxiety initially then worried about dying from a heart attack due to obesity. When first started palpitations it would last a couple days and then over time few hours, but when seen reported it didn't' occur as frequently. Would usually note when she was lying down at night. Did not feel while at work or while walking to the bus unless had to run. Usually when occurred at night would say a prayer and go to bed and get up next am feeling fine. With the palpitations she had no associated symptoms. . She also noted her Mom was concerned about her having bipolar disorder. She & her friends did not think she had it but her Mom was concerned about bipolar and adult ADHD as mom had it. Noted periods of increased sexuality but no periods of getting by with no to less sleep & denied any visual or auditory hallucinations or psychosis.  Asthma was stable on Advair 100/50 & and albuterol prn. Noted did use her dads Symbicort inhaler when didn't have insurance & only used Albuterol neb if running out of meds. Exam was benign. Had no red flag symptom or signs. EKG showed NSR. Suspected anxiety and weight playing a role. Asthma was well controlled & continued on lower dosing of Advair.To work on lifestyle. Referred to psychology for diagnostic testing,  counseling and then psyche if needed meds regarding concern about family hx of bipolar & ADHD. Referred to cardiology, & Holter ordered & referred to bariatrics. To consider seeing sleep in future given obesity & possible hx of snoring. Was to see gyn for irregular menstrual cycle and to do pap with them.   CMP, TSH, iron, HCG, HB A1c, CBC, was normal. Ferritin was lower end of normal and Holter was normal.  Seen by gyn 3/2019 for irregular menses, recurrent BV, Cx was normal, pap not done & progesterone was normal. No showed to follow up & cancelled apt 4/4/, 4/11, 4/17 & 4/18. Seen by cardiology 4/17 & reassured heart was normal & to work on loosing weight. Seen in the ER 8/10 after drinking alcohol with Suicidal ideation but evaluated and discharged home. Seen then by [facility] gyn 8/30 for AUB & given progesterone only pill & pelvic u/S ordered , [facility] and Zeeshan signed to get report. Pelvic u/S 9/19 showed a 1.2 cm upper uterine segment polyp or submucosal fibroid & a 3.3cm simple right ovarian cyst. Seen by Gyn and PAP and GC obtained was normal. Seen by gyn 10/3/19 & planned for hysteroscopy polypectomy for AUB & u/S findings. Seen by [facility] on 10/25/19 for vaginal odor but wet prep was normal. Cancelled and rescheduled from 11/29/19.  Seen 12/6/19 for atypical chest pain, right leg fatigue and shin bruise and left kneecap popping out.  Examination was benign. Chest pain did not sound cardiac. Extensive work up this past year with EKG, Holter and cardiology ruled out cardiac cause. Given weight and use of  birth control though  did check basic tests. Suspected had costochondritis related to weight and sitting slouched and stress. Advised that physical therapy and weight loss could help. Right leg fatigue did not sound like a DVT and left knee symptoms were suggestive of patella femoral syndrome/ subluxation. Asthma was reported well controlled but wheezing was heard and  reported had not taken her meds that am. Gave herself an inhaler while in the room. Asthma action plan given. Sylvie/ MDD noted  stable, & declined counseling or meds at the time.  Was advised to avoid alcohol.   To consider seeing bariatrics and sleep to evaluate and treat for  possible sleep apnea and help loose weight in the future. These referrals were given earlier.  CBC, CMP, ferritin, d-dimer and EKG were normal.  Doppler was negative and just showed the old bruise right anterior shin.  Did see sports medicine on 12/19 and diagnosed with subluxation of the left patella and referred to physical therapy which she says she has started doing.  No showed or canceled several times and finally rescheduled.     Seen 1/10/2020 for preop for endometrial polyp removal.  Prior to procedure.  Did on care on 4/19 for viral symptoms.  TE done on 6/2020 desiring phentermine for weight loss & referred to bariatrics. 6/12 UA, GC HCG, TSH, cbc  in an outside clinic was normal. Seen by endo 6/19/20 & started on phentermine. Called in June as well about worsening leg edema, advised low salt, increase exercise & check with endo about meds, advised to come in for an apt to discuss more, to check for venous incompetence, felt diuretic not indicated.  Seen in UC 8/23/20 for chest tightness, nausea & vomiting. EKG was normal & given Zofran. In UC at [facility] 9/4 for vaginal symptoms/ UTI symptoms since broke up desiring std testing. Gc, UA trich was negative. Negative for Hep C, B, syphilis & HIV. Apt 9/24 cancelled due to lack of transportation.  30 min late to 10/23 apt so rescheduled.     Seen 11/2/2020 for a physical & chronic concerns. Noted asthma was controlled on Advair bid and albuterol prn and meds & asthma action plan reviewed. Had refills. Remained on Micronor Birth control managed by gyn. Was to follow pelvic pain with gyn as needed. Discussed to consider sleep eval maybe after weight loss surgery. Was to follow up ortho as  needed for knee pain. Discussed that Diet, exercise & weight loss would help. Ordered a Vein ultrasound to check for leaky veins given hx of edema. Encouraged to Elevate, avoid salt, consider compressions socks & would make further recommendations after this was reviewed. Reported chest pains sounded muscular, was monitor, & offered to do an u/s left breast if persisted. Was  Asymptomatic at the visit [place] criteria was low for PE. Was to stay active, monitor with her cycle. Was to consider a personal therapist, noted then was in family therapy.  Labs negative for Hep c, Hep B and immune, negative for syphilis, HIV. Low HDL, normal LDL, CMP, TSH, cbc  Had u/s showing varicose veins on 3/4/21 and advised compression, elevation, weight loss and referral to vascular if symptomatic despite that.      Seen by bariatrics in dec 2020. Seen by sleep 12/15/20 for snoring, apnea, obesity and sleep study ordered.  Seen by bariatrics regarding qualifying for bariatric surgery. Referred to sleep by them and had a HST on 1/2021 that was negative for sleep apnea. Seen by psychologist as part of bariatric surgery qualification process on 1/26/21, 2/4/21, 2/11/21, 2/23/21, 2/25/21. Seen by pulmonary on 2/11/21 and noted no CAILIN and cleared to go ahead with bariatric surgery. Seen by bariatrics 3/3, 4/1 & 5/6/21   No showed to apt on 5/19/21 with PCP as constipation resolved. Cleared to have bariatric surgery by may but opted to defer for now.  Seen by a psychologist for eating issues on 5/25/21 and referred for diagnostic eval. Seen by Ronald Valerio on 6/7, 6/8 and diagnostic testing done 6/17 and noted on 7/5 had a dx of ADHD inattentive type and advised to see PCP or psychiatrist to consider meds in addition to CBT and mindfulness.      Seen by me virtually 7/26/21 for new dx of ADHD. Reviewed new diagnosis. 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 Counseled I do not manage ADHD meds. [employer] where she works is out  of network so cannot refer to them but she was to check with her insurance and her work place. Referred to a community psychiatrist for recent new diagnosis of ADHD inattentive type for med management. Encouraged to consider CBT for ADHD with counselor and mindfulness for it too. Noted asthma was stable. Encouraged to use Advair twice a day daily so could exercise better. Was to follow up with bariatrics, sleep, gyn, ortho, as needed. Advised to use compression socks for varicose veins, felt should improve with weight loss. Noted had been approved for bariatric surgery but had decided to place on hold as desired to do it with diet and exercise, had seen a Counsellor for eating habits. Health care maintenance reviewed. Reminded to work on advance directives & was to return in nov for a physical.     Called 8/26 about intermittent chest tightness & sharp pinching left sided chest pain.advised to increase Advair to usual twice a day dosing & to be seen if having chest pain. Given albuterol neb on request but counseled would be no different than  of her albuterol inhaler.  Seen at [facility] on 8/27/21, felt had asthma exacerbation, Covid testing done, given a duneb & sent home on prednisone & referred to cardiology. Covid test came back negative. cxr normal.  Seen by cardiology at [place] on 8/31/21 for pinching sensation in chest every few days past 3 yrs. EKG showed NSR, borderline incomplete right bundle branch block. Corrected QT interval normal. Borderline voltage for left ventricular hypertrophy.  Cardiology diagnosed with atypical pinching sensation in the left chest that did not sound typical for cardiac. Advised to get a stress echo and follow up with results.. reported had Upper respiratory type symptoms following a COVID vaccination. Her recent asthma exacerbation, had improved. Weight was discussed were to review an exercise prescription once results of the stress echo resting function reviewed and excluded  the very rare possibility of myocarditis. Stress echo not scheduled.   Telemedicine done with nutritionist at [facility] on 11/17/21 for [facility] weight management.  No showed to PCP apt 11/30/21.   [place]  shows received methylphenidate 5 mg #14 on 9/18, then 10 mg # 30 on 10/4/21 & 5 mg # 60 on 10/27/21 by Vince Mcguire    Seen 1/18/22 for preventive health and additional concerns. 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 Pelvic / PAP/ HPV due and reported to get with her gynecologist on 1/25/2022 at [facility]. Health care maintenance reviewed. To consider working on healthcare directives and honoring choices form given. Labs were done prior to appointment. & opted to wait to see Gyn to get blood type done. Declined flu shot & COVID booster. Given Pneumovax 23.   BMI more than 48, working with weight watchers and had lost 6 pounds,  Previously seen by bariatrics & was cleared for surgery in 2021 at [facility] but opted not to go that route. Given compression socks to wear during the day for history of venous incompetence and dependent edema. Hx of irregular menstrual cycle likely related to weight and progesterone only birth control. To discuss evaluation of PCOS with gynecologist when saw them on the 25th. Hemoglobin A1c did not show diabetes or prediabetes. Further refills of progesterone only birth control to be done by gynecologist at the visit.  For asthma, well controlled at the time noted had only been using Advair once every other day. Encouraged Advair 1 puff twice a day preferably, or to bump up to recommended dosing when felt more short of breath. Asthma action plan in place.  Hx of intermittent pinching left sided chest wall atypical chest pain off and on in the past. Last occurred in August 2021 when seen by an urgent care at [facility] and referred to  cardiologist who did an EKG which was normal. Symptoms did not sound cardiac but was recommended to get a stress test which did not get done. History of asymptomatic enlarged tonsils. History of snoring but reported had been checked with a sleep evaluation done at [facility] in 2021 & was negative for obstructive sleep apnea. To try and avoid sleeping on back as much as possible. Had a family history of diabetes. History of subluxation left patella. Recommended quadriceps isometric strengthening exercises ten sets twice a day. If no improvement or got worse recommended follow-up with orthopedics. History of depression and anxiety stable off medications. History of ADHD seen by a community psychiatrist and given Ritalin 5 mg. 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 Was to follow-up with her community psychiatrist as needed and can refer to a counselor if needs more help. Was to return in 6 months for asthma and in 1 year for preventive physical.  Labs showed normal B12, lipids, TSH, CMP, vitamin D, ferritin, CBC and hemoglobin A1c.    Seen by gynecology 1/25/2022 at [facility] on no Pap was done advised ultrasound pelvis.  Seen by counselor 3/9/2022 for adjustment disorder with MDD and ADHD.  Seen in ER 5/30/2022 for facial swelling possible allergy given EpiPen and prednisone.  Seen on 6/17/22 in [place] urgent care diagnosed with viral URI.  Seen later in [facility] urgent care and advised the same thing and supportive care given.  Lantus test done 7/2022 was negative.  Seen in ER 9/25/2022 for chest pain D-dimer EKG CRP and examination was unremarkable and reassured and discharged home.  Seen 10/10/2022 for asthma exacerbation treated with prednisone Singulair and DuoNeb.  Seen 6/2/2023 for asthma exacerbation and given Medrol and Zyrtec.  Noted had run out of insurance since beginning of  the year.    CURRENTLY  Here for preventive health and additional concerns today   No breast issues  No fh of breast, ovarian or colon, cancer  Pelvic / PAP/ HPV due, menstruating now to see ob at [facility] 8/23 & will get with them & send us records.  Feels no risk of stds, is with same partner  Moved to [place] since last seen & living with boyfriend, mom lives in [place] & moms  passed away recently. She recently got a car & drove here today  Health care maintenance reviewed.  No ACP on file and honoring choices form given.  Labs today and will make further recommendations once reviewed  Declines COVID booster felt it caused her last asthma exacerbation    BMI more than 49 now, did weight watchers in the past. Previously seen by bariatrics & was cleared for surgery in 2021 at [facility] but opted not to go that route. Has been trying to loose weight but gained instead. Her sister sees a weight doctor and would like a referral to see if would also benefit from shots like wegovy. There is FH of DM.     Asthma, on Advair previously, recently wixela, inconsistent use due to no insurance ran out of med. No er visit for asthma but has had 2 exacerbations in last 1.5 yrs requiring prednisone, last in 6/2023. Not used Singulair in over 3 months. Felt some exacerbations from lighting perfumed candles at home so has stopped that. Was given zyrtec recently . Has had no recent allergy symptoms.     In 2022 notes had an allergic reaction to eating foods high in soy and eyes became puffy and went to the ER.  Was given an EpiPen but never picked up his boyfriend has 1 he is allergic to almonds.  There was no testing done to prove that she has a soy allergy but has been avoiding it.  Would be interested in seeing an allergist.  She has not had to use the EpiPen yet.    History of snoring but has been checked with a sleep evaluation done at [facility] in 2021 & was negative for obstructive sleep apnea. Partner says she snores.  History of enlarged tonsils currently asymptomatic.    History of venous incompetence and dependent edema.  Was given prescription for compression socks in the past but never picked up.  A venous incompetency ultrasound study was ordered but not done.  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       Irregular menstrual cycle likely related to weight and progesterone only birth control in the past.  No longer on birth control and reports cycles have been regular last 3 months.  Told had fibroids and an ultrasound in 2022.  Has plans to see her OB/GYN at [facility] on 23 August when we will get her Pap smear.  Currently menstruating.      Depression in remission.  Feels anxiety stable on no meds mostly situational.  Declines need for medications or counseling.  History of ADHD seen by community psychiatrist and given Ritalin 5 mg. Was making her more anxious and did not like the side effects so she stopped taking it.  Hx of intermittent pinching left sided chest wall atypical chest pain off and on in the past. Last occurred in August 2021 when seen by an urgent care at [facility] and referred to cardiologist who did an EKG which was normal. Symptoms do not sound cardiac but was recommended to get a stress test which did not get done.  Seen in the ER again for similar chest pain 9/25/2022 and work-up was unremarkable and reassured and sent home likely anxiety.     History of subluxation left patella.  No longer an issue    Social History     Tobacco Use    Smoking status: Never    Smokeless tobacco: Never   Substance Use Topics    Alcohol use: Yes         8/8/2023    11:02 AM   Alcohol Use   Prescreen: >3 drinks/day or >7 drinks/week? No          No data to display              Reviewed orders with patient.  Reviewed health maintenance and updated orders accordingly - Yes  BP Readings from Last 3 Encounters:   08/08/23 118/74   06/02/23 126/81   05/30/22 136/82    Wt Readings from  Last 3 Encounters:   08/08/23 135.6 kg (299 lb)   06/02/23 135.7 kg (299 lb 3.2 oz)   05/30/22 126.6 kg (279 lb)                  Patient Active Problem List   Diagnosis    Family history of diabetes mellitus    Enlarged tonsils    Moderate persistent asthma without complication    Morbid obesity (H)    Irregular menstrual cycle    JAYE (generalized anxiety disorder)    Major depressive disorder, remission status unspecified, unspecified whether recurrent    Subluxation of left patella, sequela    Venous incompetence    Attention deficit hyperactivity disorder (ADHD), predominantly inattentive type    Hx of allergic reaction     Past Surgical History:   Procedure Laterality Date    BIOPSY  2018    GYN SURGERY  2019    Vaginal polyop    US BREAST BIOPSY LT  2017    benign, still has a lump       Social History     Tobacco Use    Smoking status: Never    Smokeless tobacco: Never   Substance Use Topics    Alcohol use: Yes     Comment: few times a month     Family History   Problem Relation Age of Onset    Hypertension Mother     Diabetes Mother         type 2    Arthritis Mother     Bipolar Disorder Mother         per mom    Obesity Mother     Bipolar Disorder Brother     Diabetes Maternal Grandmother         type 2    Cerebrovascular Disease Maternal Grandmother     Cardiovascular Paternal Grandfather         Lung         Current Outpatient Medications   Medication Sig Dispense Refill    budesonide-formoterol (SYMBICORT) 160-4.5 MCG/ACT Inhaler Inhale 2 puffs twice daily plus 1-2 puffs as needed. May use up to 12 puffs per day. 20.4 g 11    cetirizine (ZYRTEC) 10 MG tablet TAKE 1 TABLET BY MOUTH EVERY EVENING (Patient not taking: Reported on 8/8/2023) 30 tablet 0    EPINEPHrine (ANY BX GENERIC EQUIV) 0.3 MG/0.3ML injection 2-pack Inject 0.3 mLs (0.3 mg) into the muscle once as needed for anaphylaxis (Patient not taking: Reported on 8/8/2023) 1 each 0     Allergies   Allergen Reactions    Soy Allergy Itching and  "Swelling    Cats     Copper Other (See Comments)     \"breakouts\"    Dogs      Recent Labs   Lab Test 23  1245 22  0618 22  1223 20  1109 19  1419 19  1232 18  1458   A1C 5.6  --  5.6  --   --  5.3 5.5   LDL  --   --  59 70  --   --  52   HDL  --   --  36* 31*  --   --  29*   TRIG  --   --  38 25  --   --  73   ALT  --  63* 21 21 19 18 22   CR  --  0.81 0.81 0.73 0.90 0.76 0.73   GFRESTIMATED  --  >90 >90 >90 88 >90 >90   GFRESTBLACK  --   --   --  >90 >90 >90 >90   POTASSIUM  --  3.6 3.8 3.9 3.7 4.0 3.5   TSH  --   --  1.02 0.98  --  0.58 0.55        Breast Cancer Screenin/14/2022     1:27 AM   Breast CA Risk Assessment (FHS-7)   Do you have a family history of breast, colon, or ovarian cancer? No / Unknown       click delete button to remove this line now  Patient under 40 years of age: Routine Mammogram Screening not recommended.   Pertinent mammograms are reviewed under the imaging tab.    History of abnormal Pap smear: NO - age 30-65 PAP every 5 years with negative HPV co-testing recommended  Last 3 Pap and HPV Results:       10/7/2016    12:00 AM   PAP / HPV   PAP (Historical) NIL          10/7/2016    12:00 AM   PAP / HPV   PAP (Historical) NIL      Reviewed and updated as needed this visit by clinical staff   Tobacco  Allergies  Meds              Reviewed and updated as needed this visit by Provider                 Past Medical History:   Diagnosis Date    Abnormal uterine bleeding (AUB) 2019    Asthma     Enlarged tonsils     History of edema 10/23/2020    Leg fatigue 10/23/2020    Low ferritin 2019    Palpitations 2019    Uterine polyp 2019      Past Surgical History:   Procedure Laterality Date    BIOPSY  2018    GYN SURGERY  2019    Vaginal polyop    US BREAST BIOPSY LT  2017    benign, still has a lump     OB History    Para Term  AB Living   0 0 0 0 0 0   SAB IAB Ectopic Multiple Live Births   0 0 0 0 0 " "      Review of Systems   Constitutional:  Negative for chills and fever.   HENT:  Negative for congestion, ear pain, hearing loss and sore throat.    Eyes:  Negative for pain and visual disturbance.   Respiratory:  Negative for cough and shortness of breath.    Cardiovascular:  Negative for chest pain, palpitations and peripheral edema.   Gastrointestinal:  Negative for abdominal pain, constipation, diarrhea, heartburn, hematochezia and nausea.   Breasts:  Negative for tenderness, breast mass and discharge.   Genitourinary:  Negative for dysuria, frequency, genital sores, hematuria, pelvic pain, urgency, vaginal bleeding and vaginal discharge.   Musculoskeletal:  Negative for arthralgias, joint swelling and myalgias.   Skin:  Negative for rash.   Neurological:  Negative for dizziness, weakness, headaches and paresthesias.   Psychiatric/Behavioral:  Negative for mood changes. The patient is nervous/anxious.       OBJECTIVE:   /74 (BP Location: Right arm, Patient Position: Sitting, Cuff Size: Adult Large)   Pulse 81   Temp 97.6  F (36.4  C) (Temporal)   Resp 15   Ht 1.664 m (5' 5.5\")   Wt 135.6 kg (299 lb)   LMP 08/07/2023   SpO2 95%   BMI 49.00 kg/m    Physical Exam  GENERAL: healthy, alert, no distress, and obese  EYES: Eyes grossly normal to inspection, PERRL and conjunctivae and sclerae normal  HENT: ear canals and TM's normal, nose and mouth without ulcers or lesions  NECK: no adenopathy, no asymmetry, masses, or scars and thyroid normal to palpation  RESP: lungs clear to auscultation - no rales, rhonchi or wheezes  BREAST: normal without masses, tenderness or nipple discharge and no palpable axillary masses or adenopathy  CV: regular rate and rhythm, normal S1 S2, no S3 or S4, no murmur, click or rub, no peripheral edema and peripheral pulses strong  ABDOMEN: soft, non tender, no hepatosplenomegaly, no masses and bowel sounds normal  MS: no gross musculoskeletal defects noted, no edema  SKIN: no " suspicious lesions or rashes  NEURO: Normal strength and tone, mentation intact and speech normal  PSYCH: mentation appears normal, affect normal/bright    Diagnostic Test Results:  Labs reviewed in Epic  Results for orders placed or performed in visit on 08/08/23 (from the past 24 hour(s))   CBC with platelets and differential    Narrative    The following orders were created for panel order CBC with platelets and differential.  Procedure                               Abnormality         Status                     ---------                               -----------         ------                     CBC with platelets and d...[[id number]]                      In process                   Please view results for these tests on the individual orders.   Hemoglobin A1c   Result Value Ref Range    Hemoglobin A1C 5.6 0.0 - 5.6 %     Results for orders placed or performed in visit on 08/08/23   Hemoglobin A1c     Status: Normal   Result Value Ref Range    Hemoglobin A1C 5.6 0.0 - 5.6 %   CBC with platelets and differential     Status: None (In process)    Narrative    The following orders were created for panel order CBC with platelets and differential.  Procedure                               Abnormality         Status                     ---------                               -----------         ------                     CBC with platelets and d...[[id number]]                      In process                   Please view results for these tests on the individual orders.       ASSESSMENT/PLAN:       ICD-10-CM    1. Routine history and physical examination of adult  Z00.00       2. Morbid obesity (H)  E66.01 Comprehensive metabolic panel (BMP + Alb, Alk Phos, ALT, AST, Total. Bili, TP)     Lipid Profile (Chol, Trig, HDL, LDL calc)     TSH with free T4 reflex     Hemoglobin A1c     Adult Comprehensive Weight Management  Referral     Adult Sleep Eval & Management  Referral     Comprehensive metabolic panel  (BMP + Alb, Alk Phos, ALT, AST, Total. Bili, TP)     Lipid Profile (Chol, Trig, HDL, LDL calc)     TSH with free T4 reflex     Hemoglobin A1c      3. Family history of diabetes mellitus  Z83.3 Hemoglobin A1c     Hemoglobin A1c      4. Moderate persistent asthma without complication  J45.40 Asthma Action Plan (AAP)     CBC with platelets and differential     General PFT Lab (Please always keep checked)     budesonide-formoterol (SYMBICORT) 160-4.5 MCG/ACT Inhaler     Adult Allergy/Asthma Referral     PRIMARY CARE FOLLOW-UP SCHEDULING     CBC with platelets and differential      5. Hx of allergic reaction  Z88.9     2021 seen in ER for facial swelling given an EpiPen to use never picked up, suspected due to soy, never tested, no recurrence, has been avoiding soy products      6. Snoring  R06.83       7. Enlarged tonsils  J35.1 CBC with platelets and differential     CBC with platelets and differential      8. Venous incompetence  I87.2 Compression Sleeve/Stocking Order for DME - ONLY FOR DME      9. Dependent edema  R60.9 Compression Sleeve/Stocking Order for DME - ONLY FOR DME      10. Irregular menstrual cycle  N92.6 CBC with platelets and differential     TSH with free T4 reflex     CBC with platelets and differential     TSH with free T4 reflex    improved, told had fibroids at last u/s in 2022 with [facility]      11. Major depressive disorder, remission status unspecified, unspecified whether recurrent  F32.9 TSH with free T4 reflex     TSH with free T4 reflex      12. JAYE (generalized anxiety disorder)  F41.1 TSH with free T4 reflex     TSH with free T4 reflex      13. Attention deficit hyperactivity disorder (ADHD), predominantly inattentive type  F90.0 TSH with free T4 reflex     TSH with free T4 reflex      14. Subluxation of left patella, sequela  S83.002S       15. Health care maintenance  Z00.00 REVIEW OF HEALTH MAINTENANCE PROTOCOL ORDERS      16. Advanced directives, counseling/discussion  Z71.89        17. Need for pneumococcal vaccination  Z23     declined      18. Need for COVID-19 vaccine  Z23     declined        Seen today for preventive physical and additional concerns.  Breast exam normal continue self check regularly.Prior history of left breast fibroadenoma 5 o'clock position 3 cm from nipple s/p an ultrasound-guided core biopsy which did not show any cancer or abnormal cells and no further follow-up needed for that  Screening Mammograms starting age 40.  Pap due will be getting with gynecology at [facility] on 23 August currently menstruating.  No STD testing needed, monogamous relationship same partner.  Healthcare maintenance reviewed.  Consider working on healthcare directives.  Vaccines reviewed.  Recommend Prevnar 20 opted to defer today.  Recommend COVID.  Opted not to get as felt had an asthma exacerbation after the last booster.  Labs today and will make further recommendations once those are reviewed.    BMI more than 49 has tried weight watchers in the past.  Previously seen by bariatrics and cleared for surgery in 2021 at [facility] but opted not to go that route.  Has gained some more weight would be interested in seeing weight specialist for injectable med evaluation.  Referral placed.    Family history of diabetes we will check today.    Asthma no longer on Advair since lost insurance.  Using Wixela.  Has not tried Singulair for 3 months due to cost may be.  Not required Zyrtec for allergies in a while.  Recently given prednisone for asthma flare up.  Counseled that chronic steroid use can cause weight gain diabetes and worsening bone mineral density increased risk of fractures.  Given new asthma guidelines we will prescribe Symbicort 160/4.5,  2 puffs twice a day and additional 1 to 2 puffs as needed for flare up no more than 12 total puffs a day.  Asthma action plan in place    History of allergic reaction, in 2022 seen in ER for facial swelling given an EpiPen to use but never picked up,  suspected due to soy, never tested, no recurrence, has been avoiding soy products.  Reports has access to boyfriend's EpiPen.  Advised if used then needs to go to the ER.  Referral to an allergist given.    History of snoring, reported prior sleep study done as part of bariatric surgery work-up in [facility] in 2021 had been unremarkable.  Continues to snore.  Has had some weight gain.  Recommend sleeping on side referred to sleep specialist and hopefully will improve with weight loss.    Has had enlarged tonsils in the past currently do not believe it is obstructing airway cannot rule out that it is not contributing to snoring    History of venous incompetence and dependent edema.  Did not do the venous incompetence ultrasound in the past.  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  Prescription given to take to any home medical company.  Would be best if they could measure before giving her the correct size.  Knee-high 15 to 20 mmHg prescribed.  Weight loss will likely help.  If after this continues to be a concern then may be could do the ultrasound as previously discussed.    Irregular menstrual cycles improved since went off the birth control.  Reported ultrasound done in [facility] January 2022 showed fibroids.  She will discuss more with Gyn when sees them in August.    Depression in remission.  Generalized anxiety remains.  Currently declines need for counseling or medications.  Regular physical activity is helpful.  May try magnesium over-the-counter to help with anxiety.  History of ADHD diagnosed in 2021 was seeing a psychiatrist and given Ritalin to try but did not like the anxiety related to the med and worried about the side effects..  Currently doing well without it.    History of intermittent pinching left-sided chest wall pain is unlikely to be cardiac.  Currently no symptoms.  Seen in the past in urgent care and ER as well as by  cardiology.    Left patellar subluxation no longer an issue currently.    Follow-up in 3 months to recheck asthma and sooner as needed.    Later after the visit labs showed  HB A1c wnl  Patient has been advised of split billing requirements and indicates understanding: Yes      COUNSELING:  Reviewed preventive health counseling, as reflected in patient instructions       Regular exercise       Healthy diet/nutrition       Vision screening        Immunizations  Declined: Covid-19 and Pneumococcal due to Concerns about side effects/safety           Alcohol Use       Contraception       Osteoporosis prevention/bone health       Safe sex practices/STD prevention       The ASCVD Risk score (Carlton DK, et al., 2019) failed to calculate for the following reasons:    The 2019 ASCVD risk score is only valid for ages 40 to 79       Advance Care Planning    She reports that she has never smoked. She has never used smokeless tobacco.          Angela Pelayo MD  [facility]    The above note was dictated using voice recognition. Although reviewed after completion, some word and grammatical error may remain .

## 2023-08-08 NOTE — PATIENT INSTRUCTIONS
Seen today for preventive physical and additional concerns.  Breast exam normal continue self check regularly.  Mammograms starting age 40.  Diagnostic evaluation was unremarkable.  Pap due will be getting with gynecology at [facility] on 23 August currently menstruating.  No STD testing needed, monogamous relationship same partner.  Healthcare maintenance reviewed.  Consider working on healthcare directives.  Vaccines reviewed.  Recommend Prevnar 20 opted to defer today.  Recommend COVID.  Opted not to get as felt had an asthma exacerbation after the last booster.  Labs today and will make further recommendations once those are reviewed.    BMI more than 49 has tried weight watchers in the past.  Previously seen by bariatrics and cleared for surgery in 2021 at [facility] but opted not to go that route.  Has gained some more weight would be interested in seeing weight specialist for injectable  Evaluation.  Referral placed.    Family history of diabetes we will check today.    Asthma no longer on Advair since lost insurance.  Using Wixela.  Has not tried Singulair for 3 months due to cost may be.  Not required Zyrtec for allergies in a while.  Recently given prednisone for asthma flareup.  Counseled that chronic steroid use can cause weight gain diabetes and worsening bone mineral density increased risk of fractures.  Given new asthma guidelines we will prescribe Symbicort 160/4.5 2 puffs twice a day and additional 1 to 2 puffs as needed for flareup no more than 12 total puffs a day.  Asthma action plan in place    History of allergic reaction, in 2021 seen in ER for facial swelling given an EpiPen to use but never picked up, suspected due to soy, never tested, no recurrence, has been avoiding soy products.  Reports has access to boyfriend's EpiPen.  Advised if used then needs to go to the ER.  Referral to an allergist given.    History of snoring, reported prior sleep study done as part of bariatric surgery work-up in  [facility] in 2021 had been unremarkable.  Continues to snore.  Has had some weight gain.  Recommend sleeping on side referred to sleep specialist and hopefully will improve with weight loss.    Has had enlarged tonsils in the past currently do not believe it is obstructing airway cannot rule out that it is not contributing to snoring    History of venous incompetence and dependent edema.  Did not do the venous incompetence ultrasound in the past.  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  Prescription given to take to any home medical company.  Would be best if they could measure before giving her the correct size.  Knee-high 15 to 20 mmHg prescribed.  Weight loss will likely help.  If after this continues to be a concern then may be could do the ultrasound as previously discussed.    Irregular menstrual cycles improved since went off the birth control.  Reported ultrasound done in [facility] January 2022 showed fibroids.  To discuss more with Gyn when sees them in August.    Depression in remission.  Generalized anxiety remains.  Currently declines need for counseling or medications.  Regular physical activity is helpful.  May try magnesium over-the-counter to help with anxiety.  History of ADHD diagnosed in 2021 was seeing a psychiatrist and given Ritalin to try but did not like the anxiety related to the med and worried about the side effects..  Currently doing well without it.    History of intermittent pinching left-sided chest wall pain is unlikely to be cardiac.  Currently no symptoms.  Seen in the past in urgent care and ER as well as by cardiology.    Left patellar subluxation no longer an issue currently.    Follow-up in 3 months to recheck asthma and sooner as needed.    The above note was dictated using voice recognition. Although reviewed after completion, some word and grammatical error may remain .         Preventive Health Recommendations  Female  Ages 26 - 39  Yearly exam:   See your health care provider every year in order to  Review health changes.   Discuss preventive care.    Review your medicines if you your doctor has prescribed any.    Until age 30: Get a Pap test every three years (more often if you have had an abnormal result).    After age 30: Talk to your doctor about whether you should have a Pap test every 3 years or have a Pap test with HPV screening every 5 years.   You do not need a Pap test if your uterus was removed (hysterectomy) and you have not had cancer.  You should be tested each year for STDs (sexually transmitted diseases), if you're at risk.   Talk to your provider about how often to have your cholesterol checked.  If you are at risk for diabetes, you should have a diabetes test (fasting glucose).  Shots: Get a flu shot each year. Get a tetanus shot every 10 years.   Nutrition:   Eat at least 5 servings of fruits and vegetables each day.  Eat whole-grain bread, whole-wheat pasta and brown rice instead of white grains and rice.  Get adequate Calcium and Vitamin D.     Lifestyle  Exercise at least 150 minutes a week (30 minutes a day, 5 days of the week). This will help you control your weight and prevent disease.  Limit alcohol to one drink per day.  No smoking.   Wear sunscreen to prevent skin cancer.  See your dentist every six months for an exam and cleaning.

## 2023-08-18 ENCOUNTER — TELEPHONE (OUTPATIENT)
Dept: FAMILY MEDICINE | Facility: CLINIC | Age: 33
End: 2023-08-18
Payer: COMMERCIAL

## 2023-08-18 DIAGNOSIS — J45.40 MODERATE PERSISTENT ASTHMA WITHOUT COMPLICATION: ICD-10-CM

## 2023-08-18 NOTE — TELEPHONE ENCOUNTER
"PA team -    Can you look into this?     See refill encounter from today 8/18    \"  Angela Pelayo MD Physician Signed  4:09 PM          The pharmacy needs to use the code ordered with the medication as this is the recommended meant for asthma and it is covered if they use that code.           My Note Signed  3:30 PM        Dr. Pelayo--      See pts message. Symbicort not covered, pt requesting to go back on pended medication     LANDY Iyer RN  [facility]         \"  "

## 2023-08-18 NOTE — TELEPHONE ENCOUNTER
Dr. Pelayo--     See pts message. Symbicort not covered, pt requesting to go back on pended medication    LANDY Iyer RN  [facility]

## 2023-08-18 NOTE — TELEPHONE ENCOUNTER
Medication Question or Refill    Contacts         Type Contact Phone/Fax    08/18/2023 03:25 PM CDT Phone (Incoming) Yael Johnson (Self) [phone] (M)            What medication are you calling about (include dose and sig)?: ADVAIR DISKUS 100-50 MCG/ACT inhaler     Preferred Pharmacy:    [facility]/pharmacy #[id number] - [place], [place] - [address]  [address] [address]  Phone: [phone] Fax: [phone]        Controlled Substance Agreement on file:   CSA -- Patient Level:    CSA: None found at the patient level.       Who prescribed the medication?: PCP     Do you need a refill? Yes, Pt stated that she does not have any medication at this time. SYMBICORT is not covered by insurance and patient wants to go back to the Advair since that was covered     Patient offered an appointment? Yes: Pt declined     Do you have any questions or concerns?  No      Could we send this information to you in [employer] or would you prefer to receive a phone call?:   Patient would like to be contacted via [employer]

## 2023-08-18 NOTE — TELEPHONE ENCOUNTER
The pharmacy needs to use the code ordered with the medication as this is the recommended meant for asthma and it is covered if they use that code.

## 2023-08-18 NOTE — TELEPHONE ENCOUNTER
Sent to PA team in another encounter to look into this.    It was sent with diagnosis of moderate asthma so unsure how pharmacy would have used a different code.    GUILLE IyerN HUGO  [facility]

## 2023-08-22 ENCOUNTER — MYC MEDICAL ADVICE (OUTPATIENT)
Dept: FAMILY MEDICINE | Facility: CLINIC | Age: 33
End: 2023-08-22
Payer: COMMERCIAL

## 2023-08-22 NOTE — TELEPHONE ENCOUNTER
Per pharmacy they do not have an active insurance card on file for this patient.  She has been getting everything under a discount card.  The PA team does not do anything with discount card billing.  Pharmacy states if patient has a current Pharmacy benefits card on file she will need to bring that in. Clinic can contact pharmacy with any additional questions as this is not a PA issue since pharmacy does not have prescription insurance on file for this patient and issue is with discount card.

## 2023-08-22 NOTE — TELEPHONE ENCOUNTER
Per 8/18/23 refill encounter, patient would like to be contacted via [employer].    [employer] message sent to patient.    LANDY Gilbert, RN-BC  [facility]

## 2023-08-23 RX ORDER — FLUTICASONE PROPIONATE AND SALMETEROL 100; 50 UG/1; UG/1
1 POWDER RESPIRATORY (INHALATION) EVERY 12 HOURS
Qty: 60 EACH | Refills: 4 | OUTPATIENT
Start: 2023-08-23

## 2023-10-09 NOTE — NURSING NOTE
Vitals completed successfully and medication reconciled.     Tory Tillman CMA  1:24 PM  Chief Complaint   Patient presents with     New Patient     new palpitation        3 Spironolactone Counseling: Patient advised regarding risks of diarrhea, abdominal pain, hyperkalemia, birth defects (for female patients), liver toxicity and renal toxicity. The patient may need blood work to monitor liver and kidney function and potassium levels while on therapy. The patient verbalized understanding of the proper use and possible adverse effects of spironolactone.  All of the patient's questions and concerns were addressed.

## 2023-10-20 DIAGNOSIS — J45.40 MODERATE PERSISTENT ASTHMA WITHOUT COMPLICATION: Primary | ICD-10-CM

## 2023-10-20 NOTE — TELEPHONE ENCOUNTER
New Medication Request    Contacts         Type Contact Phone/Fax    10/20/2023 09:59 AM CDT Phone (Incoming) Yael Johnson (Self) [phone] (M)            What medication are you requesting?: Wixela 100-50    Reason for medication request: Asthma. Patient does not have insurance right now and can not afford Symbicort but can afford the Wixela.     Have you taken this medication before?: Yes: patient has taken medication before can not find medication on med list     Controlled Substance Agreement on file:   CSA -- Patient Level:    CSA: None found at the patient level.         Patient offered an appointment? Yes: pt declined as she has no insurance     Preferred Pharmacy:    [facility]/pharmacy #[id number] - [place], [place] - [address] [place] [address]  [place] [address]  Phone: [phone] Fax: [phone]      Could we send this information to you in [facility] or would you prefer to receive a phone call?:   Patient would prefer a phone call   Okay to leave a detailed message?: Yes at Home number on file [phone] (home)

## 2023-10-23 RX ORDER — FLUTICASONE PROPIONATE AND SALMETEROL 100; 50 UG/1; UG/1
1 POWDER RESPIRATORY (INHALATION) EVERY 12 HOURS
Qty: 3 EACH | Refills: 1 | Status: SHIPPED | OUTPATIENT
Start: 2023-10-23 | End: 2024-07-25

## 2023-10-23 NOTE — TELEPHONE ENCOUNTER
Patient is now complete out of her inhaler and again she has now insurance so what she was using is to expensive for her right now with no insurance, so patient is asking for a different inhaler Wixela 100-50 which she can a ford.   Patient is asking to get filled ASAP so she doesn't end up in the ER which that would even cost her more money that she does not have

## 2023-10-23 NOTE — TELEPHONE ENCOUNTER
Prescription for Wixela sent to [facility] on [address] in [place].  Patient informed.  Nina Costello RN  [facility]

## 2023-11-10 ENCOUNTER — MYC MEDICAL ADVICE (OUTPATIENT)
Dept: FAMILY MEDICINE | Facility: CLINIC | Age: 33
End: 2023-11-10
Payer: MEDICAID

## 2023-11-10 DIAGNOSIS — Z01.84 IMMUNITY STATUS TESTING: Primary | ICD-10-CM

## 2023-11-10 NOTE — LETTER
11/10/2023        RE: Yael Johnson  [address]  [place] [address]        To Whom It May Concern:    Yael Johnson is under my professional care.  She reports no history of having chicken pox and has not received the chicken pox vaccine.    Sincerely,        Angela Pelayo MD/at

## 2023-11-14 NOTE — TELEPHONE ENCOUNTER
Your ll can state she has no known chickenpox reported and no hx of varicella vaccine but I would not recommend she does not need a vaccine. She should get titers and if not immune get vaccinated against chicken pox

## 2023-11-14 NOTE — TELEPHONE ENCOUNTER
Writer responded via [employer].  GUILLE GilbertN, RN-BC  [facility]

## 2023-11-14 NOTE — TELEPHONE ENCOUNTER
"Dr. Pelayo-Patient would like letter and also plans to schedule lab appt.  Letter pended.    \"Okay great if she can write me that letter that is great and I ll call to make appointment as well.      Thanks! \"    Thank you!  GUILLE GilbertN, RN-[facility]    "

## 2023-11-14 NOTE — TELEPHONE ENCOUNTER
"Dr. Pelayo-Please review letter request.  Do you recommend titers first, before providing letter?    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 I am wondering if you can upload a letter stating I have no medical history of chickenpox and that I do not need a varicella vaccine. If you can type that up and upload it asap that would be amazing.      Thank you,   Yael Blaser\"    Thank you!  GUILLE GilbertN, RN-[facility]    "

## 2023-11-14 NOTE — TELEPHONE ENCOUNTER
Writer responded via [employer].  GUILLE GilbertN, RN-BC  [facility]

## 2023-12-19 DIAGNOSIS — J45.40 MODERATE PERSISTENT ASTHMA WITHOUT COMPLICATION: ICD-10-CM

## 2023-12-19 RX ORDER — FLUTICASONE PROPIONATE AND SALMETEROL 100; 50 UG/1; UG/1
1 POWDER RESPIRATORY (INHALATION) EVERY 12 HOURS
Qty: 3 EACH | Refills: 1 | OUTPATIENT
Start: 2023-12-19

## 2023-12-19 NOTE — TELEPHONE ENCOUNTER
Patient is out of the medication. Also is getting insurance at the beginning of January and will schedule a follow up in the future. Any questions can call [phone] and okay to leave detailed message

## 2023-12-19 NOTE — TELEPHONE ENCOUNTER
Patient given message from Dr Pelayo.  States she forgot to check with the pharmacy and had only gone off of My Chart.  Will check with her pharmacy and call back if any problems.  Nina Costello RN  [facility]

## 2024-02-13 NOTE — PROGRESS NOTES
"Yael is a 33 year old who is being evaluated via a billable video visit.      The patient has been notified of following:     \"This video visit will be conducted via a call between you and your physician/provider. We have found that certain health care needs can be provided without the need for an in-person physical exam.  This service lets us provide the care you need with a video conversation.  If a prescription is necessary we can send it directly to your pharmacy.  If lab work is needed we can place an order for that and you can then stop by our lab to have the test done at a later time.    Video visits are billed at different rates depending on your insurance coverage.  Please reach out to your insurance provider with any questions.    If during the course of the call the physician/provider feels a video visit is not appropriate, you will not be charged for this service.\"    Patient has given verbal consent for Video visit? Yes    How would you like to obtain your AVS? MyChart    If the video visit is dropped, the invitation should be resent by: Text to cell phone: [phone]    Will anyone else be joining your video visit? No    I    Video-Visit Details    Type of service:  Video Visit    Originating Location (pt. Location: in [place]    Distant Location (provider location):   [facility]    Platform used for Video Visit: KickAss Candy    Video Start Time: 10:27 AM    Video End Time:11:18 AM  2024    New Medical Weight Management Consult    PATIENT:  Yael Johnson   MRN:         [id number]   :         1990  VINCENT:         2024      Dear Angela Pelayo MD,    I had the pleasure of seeing your patient, Yael Johnson. Full intake/assessment was done to determine barriers to weight loss success and develop a treatment plan. Yael Johnson is a 33 year old female interested in treatment of medical problems associated with excess weight. She has a height of 5' " "5.5\"[from 8/8/23 physical exam[, a weight of 294 lbs 0 oz, and the calculated Body mass index is 48.18 kg/m .    Assessment & Plan   Problem List Items Addressed This Visit       Morbid obesity (H) - Primary     Initial MWM. No additional labs needed. Referred to dietitian. Plan to try for Zepbound         Relevant Medications    tirzepatide-Weight Management (ZEPBOUND) 2.5 MG/0.5ML prefilled pen    tirzepatide-Weight Management (ZEPBOUND) 5 MG/0.5ML prefilled pen     Other Visit Diagnoses       Snoring        Relevant Orders    Adult Sleep Eval & Management  Referral             PROGRAM OVERVIEW  Reviewed options at [facility].   All questions were answered. Education provided on chronic disease management of obesity.    SURGICAL WEIGHT LOSS   Option presented given pt BMI and current comorbid conditions. No current interest - may consider in the future.      MEDICATIONS:  We discussed healthy habits to assist with weight loss. We reviewed medications associated with weight gain. We discussed the role of pharmacological agents in the treatment of obesity and the \"off-label\" use of medications in this practice. We reviewed medication that may assist with weight loss. Indications, contraindications, risks/benefits, and potential side effects were discussed.   Zepbound was prescribed. Discussed mechanism of action, common side effects, titration guidelines, and monitoring for pancreatitis/gallbladder problems/low sugars/MTC/MEN2. Medication handout given in AVS. Discussed that medications must always be used together with lifestyle changes.. Reviewed rationale for long term use of pharmacotherapy in chronic disease management for obesity.      AOM considerations:  Phentermine:  Anxiety:   Yes - situational Heart palpitations - reported checked out and normal, used previously, not overly helpful - more weight loss with keto diet  Topiramate: Current birth control:  None - uses spermacidal spray "    GLP-1: wegovy/zepbound appear covered, unclear saxenda coverage     Naltrexone:    Wellbutrin: Depression:Yes - some in the past, situational anxiety   Metformin:    Contrave:  Qsymia: Current birth control: None - uses spermacidal spray             PATIENT INSTRUCTIONS:  Plan:  Start Zepbound:  You'll start at 2.5 mg once weekly for 4 weeks.   If you're tolerating it well, increase to 5 mg once weekly thereafter until I see you again.    You can take over the counter famotidine (Pepcid) 20 mg once or twice daily as needed for nausea/heartburn.    If you get your medication pen and have questions about doing the injection - call or Mychart our office for the nurses to set up a virtual teaching session with you.        Goals:  I recommend eating breakfast within an hour of waking up and eating every 4-6 hours during the day to keep your metabolism up. Prioritizing veggies and proteins for food choices is also recommended as medically appropriate.  Limit eating out to once or twice a week.  Recommend 64oz (2L) water daily as medically appropriate (6-8 glasses)  Recommend pursing regular exercise. 5 minutes of exercise every other day or every 2 days is a great place to start with aiming for 30 minutes 5 times a week as an end goal.  If you can, try to get some strength training twice weekly while losing weight to help preserve your muscle!        FOLLOW-UP:    Please call [phone] to schedule your next visit with myself and the dietitians. Schedule with myself in 3 months.     59 minutes spent on the date of the encounter doing chart review, history and exam, review test results, counseling, developing plan of care, documentation, and further activities as noted above.       Weight Related Co-morbidities:          I have the following health issues associated with obesity: High cholesterol    I have the following symptoms associated with obesity: Joint pains - knees/left and working with PT 4 years ago      Patient Active Problem List   Diagnosis    Family history of diabetes mellitus    Enlarged tonsils    Moderate persistent asthma without complication    Morbid obesity (H)    Irregular menstrual cycle    JAYE (generalized anxiety disorder)    Major depressive disorder, remission status unspecified, unspecified whether recurrent    Subluxation of left patella, sequela    Venous incompetence    Attention deficit hyperactivity disorder (ADHD), predominantly inattentive type    Hx of allergic reaction         Weight History    Previously had weight loss surgery: No - was approved a couple years ago but declined to pursue   Age pt became overweight:   Fluctuated throughout life - healthy weight until around 2013    The following factors contributed to weight gain:    Tended to be overweight  Mental health/depression (mild)     I have tried the following methods to lose weight:   Phentermine  Keto diet - helped  Herbal life shakes  Tried one meal a day  Fasting   Weight watchers    My lowest weight since age 20 was: Around 160s   My highest weight since age 20 was: 299 lbs   The most weight I have ever lost was: (lbs) Keto diet - 2017 25 lbs in 1.5 months   Family hx of obesity: yes   Are you interested in learning about weight loss surgery if appropriate?  Not right now - potentially open - may be open, concerned about side effects - wanting to do the best she can before going that route.    How has your weight changed over the last year?  Gained        Diet Recall     Wake Up: Around noon   Breakfast none - ate lunch instead    Lunch 1pm salad mixed greens - own dressing lemon juice/oil, bowtie pasta, chickpeas, juice and water    Supper 9:45 pm, chipotle chicken bowl/chips/queso, small 7 up can water    Bedtime:   Snack between meals:   Snack in evening:    3 am    none   no      Typical snacks: Not a snacking person   Caloric beverages per day. What type: Sprite or 7 up, will do a zero sugar juice recently   Water  consumed daily: water   Low mulu/diet drinks:   Alcohol:   Foods you crave:    See above   Once or twice a month    Will crave eating out - really enjoys [employer] eats/door dash, closed out door dash 2 years ago          Sleep History    How many hours do you sleep at night? 7 hours - work every other week overnights at [employer]   Do you think you have sleep apnea?  Knows snores - sleep study 2 years ago negative        Who does the grocery shopping? Self and boyfriend   Who does the cooking? Self and boyfriend       Eating Disorder Screen    I binge eat Not sure - because eats infrequently so eats a larger portion due to hunger      I make myself vomit what I have eaten or use laxatives to get rid of food. no   I eat a large amount of food, like a loaf of bread, a box of cookies, a pint/quart of ice cream, all at once. no   I eat a large amount of food even when I am not hungry. no   I eat alone because I feel embarrassed and do not want others to see how much I have eaten. no   I eat until I am uncomfortably full. no   I feel bad, disgusted, or guilty after I overeat. no       Activity/Exercise History        How many times a week are you active for the purpose of exercise? For how long? Doesn't work out currently but job is active as a nursing assistant    What do you do for exercise?  Walking/lifting/rolling at work     Work/Social History Reviewed With Patient    Employment status is: CNA   Job is:    Is job active or sedentary? active   Marital status? Boyfriend -  though so will be living with dad/step mom   Who do you live with?  See above   Do you have children? No kids         Social History     Tobacco Use    Smoking status: Never    Smokeless tobacco: Never   Vaping Use    Vaping Use: Never used   Substance Use Topics    Alcohol use: Yes     Comment: few times a month    Drug use: No        Mental Health History Reviewed With Patient    Does your mental health effect your weight  "or vice versa? Weight mental health more        Do you see a therapist?   Interested - worked with one in the past      MEDICATIONS:   Current Outpatient Medications   Medication    fluticasone-salmeterol (ADVAIR) 100-50 MCG/ACT inhaler    tirzepatide-Weight Management (ZEPBOUND) 2.5 MG/0.5ML prefilled pen    tirzepatide-Weight Management (ZEPBOUND) 5 MG/0.5ML prefilled pen     No current facility-administered medications for this visit.        ROS:    HEENT  H/O glaucoma:  no  Cardiovascular  CAD:   no  Palpitations:   yes  HTN:    no  Gastrointestinal  GERD:   no  Constipation:   sometimes  Gastroparesis:  no  Liver Dz:   no  H/O Pancreatitis:  no  H/O Gallbladder Dz: no  Psychiatric  Moods Stable:  yes  Anxiety:   Yes - situational  Depression:  Yes - some in the past   Bipolar:  no  H/O ETOH/Drug abuse no  H/O eating disorder: no  Endocrine  PMH/FMH of MTC or MEN2:  no  Neurologic:  H/O seizures:   no  Headaches:  no  Memory Impairment:  no    H/O kidney stones:  no  Kidney disease:  no  Current birth control:  None - uses spermacidal spray     Labs/Records Reviewed:  Lab reviewed in [facility]    8/8/23 hgA1c normal  TSH normal  Lipids HDL low ow normal  CMP normal  CBC RBC 5.32, MCH 25.6, MCHC 30.4, RDW 15.2 ow normal    2 years ago vitamin D normal     BP Readings from Last 6 Encounters:   09/15/23 129/74   08/08/23 118/74   06/02/23 126/81   05/30/22 136/82   01/18/22 126/78   11/12/20 123/78       Pulse Readings from Last 6 Encounters:   09/15/23 90   08/08/23 81   06/02/23 95   05/30/22 76   01/18/22 81   11/12/20 96        PHYSICAL EXAM:  Ht 5' 5.5\" (1.664 m)   Wt 294 lb (133.4 kg)   BMI 48.18 kg/m    GENERAL: Healthy, alert and no distress  EYES: Eyes grossly normal to inspection.    RESP: No audible wheeze, cough, or visible cyanosis.  No increased work of breathing.    SKIN: Visible skin clear. No significant rash, abnormal pigmentation or lesions.  NEURO: Mentation and speech appropriate for " age.  PSYCH: Mentation appears normal, affect normal/bright, judgement and insight intact, normal speech and appearance well-groomed.      COUNSELING:   Reviewed obesity as a chronic disease and comprehensive management stratagies.      We discussed Bariatric Basics including:  -eating 3 meals daily    Weight Management Tips Handout given in AVS        We discussed the importance of restorative sleep and stress management in maintaining a healthy weight.  We discussed insulin resistance and glycemic index as it relates to appetite and weight control.   We discussed the importance of physical activity including cardiovascular and strength training in maintaining a healthier weight and explored viable options.  Patient education of above written in AVS.      Sincerely,    Sulema Esquivel PA-C

## 2024-02-19 ENCOUNTER — VIRTUAL VISIT (OUTPATIENT)
Dept: URGENT CARE | Facility: CLINIC | Age: 34
End: 2024-02-19
Payer: COMMERCIAL

## 2024-02-19 ENCOUNTER — APPOINTMENT (OUTPATIENT)
Dept: LAB | Facility: CLINIC | Age: 34
End: 2024-02-19
Attending: NURSE PRACTITIONER
Payer: COMMERCIAL

## 2024-02-19 DIAGNOSIS — R05.1 ACUTE COUGH: Primary | ICD-10-CM

## 2024-02-19 PROCEDURE — 87635 SARS-COV-2 COVID-19 AMP PRB: CPT | Performed by: NURSE PRACTITIONER

## 2024-02-19 PROCEDURE — 99443 PR PHYSICIAN TELEPHONE EVALUATION 21-30 MIN: CPT | Mod: 93

## 2024-02-19 NOTE — PROGRESS NOTES
Yael is a 33 year old who is being evaluated via a billable telephone visit    Assessment & Plan     (R05.1) Acute cough  (primary encounter diagnosis)    Plan: Symptomatic COVID-19 Virus (Coronavirus) by PCR    KAYLA Fields CNP      Subjective   Yael is a 33 year old, presenting for the following health issues:  COVID TEST    HPI     Home test negative  Needs covid test for work  Cold sx cough runny nose 1 week  No sob wheezing chest pain  Hydrated       Objective           Vitals:  No vitals were obtained today due to virtual visit.    Physical Exam   GENERAL: alert and no distress  RESP: No audible wheeze, cough  PSYCH: Appropriate affect, tone, and pace of words    Telephone time spent 25 minutes    Signed Electronically by: [facility]

## 2024-02-20 ENCOUNTER — TELEPHONE (OUTPATIENT)
Dept: SURGERY | Facility: CLINIC | Age: 34
End: 2024-02-20

## 2024-02-20 ENCOUNTER — VIRTUAL VISIT (OUTPATIENT)
Dept: SURGERY | Facility: CLINIC | Age: 34
End: 2024-02-20
Payer: COMMERCIAL

## 2024-02-20 VITALS — HEIGHT: 66 IN | WEIGHT: 293 LBS | BODY MASS INDEX: 47.09 KG/M2

## 2024-02-20 DIAGNOSIS — R06.83 SNORING: ICD-10-CM

## 2024-02-20 DIAGNOSIS — E66.01 OBESITY, CLASS III, BMI 40-49.9 (MORBID OBESITY) (H): Primary | ICD-10-CM

## 2024-02-20 PROCEDURE — 99204 OFFICE O/P NEW MOD 45 MIN: CPT | Mod: 95 | Performed by: PHYSICIAN ASSISTANT

## 2024-02-20 NOTE — TELEPHONE ENCOUNTER
Prior Authorization Retail Medication Request    Medication/Dose: tirzepatide-Weight Management (ZEPBOUND) 2.5 MG/0.5ML prefilled pen, tirzepatide-Weight Management (ZEPBOUND) 5 MG/0.5ML prefilled pen  Diagnosis and ICD code (if different than what is on RX):  E66.01  New/renewal/insurance change PA/secondary ins. PA: new  Previously Tried and Failed:  diet, lifestyle, phentermine  Rationale:  weight management    Insurance   Primary: MED[employer] F[employer] INSURED  Insurance ID:  [id number]     Pharmacy Information (if different than what is on RX)  Name:  [id number] - [place] - [address]  Phone:  [phone]   Fax:[phone]

## 2024-02-20 NOTE — PATIENT INSTRUCTIONS
"To ensure quality you may receive a patient satisfaction survey. The greatest compliment you can give is \"Likely to Recommend.\"    Nice to talk with you today.  Thank you for your trust. Below is the plan discussed.-  Sulema Esquivel PA-C     Plan:  Start Zepbound:  You'll start at 2.5 mg once weekly for 4 weeks.   If you're tolerating it well, increase to 5 mg once weekly thereafter until I see you again.    You can take over the counter famotidine (Pepcid) 20 mg once or twice daily as needed for nausea/heartburn.    If you get your medication pen and have questions about doing the injection - call or Mychart our office for the nurses to set up a virtual teaching session with you.        Goals:  I recommend eating breakfast within an hour of waking up and eating every 4-6 hours during the day to keep your metabolism up. Prioritizing veggies and proteins for food choices is also recommended as medically appropriate.  Limit eating out to once or twice a week.  Recommend 64oz (2L) water daily as medically appropriate (6-8 glasses)  Recommend pursing regular exercise. 5 minutes of exercise every other day or every 2 days is a great place to start with aiming for 30 minutes 5 times a week as an end goal.  If you can, try to get some strength training twice weekly while losing weight to help preserve your muscle!        FOLLOW-UP:    Please call [phone] to schedule your next visit with myself and the dietitians. Schedule with myself in 3 months.     Zepbound (Tirzepatide)    Zepbound (Tirzepatide): is an injectable prescription medicine recently FDA approved for adults with obesity or overweight with an additional condition affected by their weight.      It is given as a shot once a week. It activates the body's receptors for GIP (glucose-dependent insulinotropic polypeptide) and GLP-1 (glucagon-like peptide-1) receptor, two naturally occurring hormones that help tell the brain that you are full. It also works is by " slowing down how quickly food leaves your stomach.     You will start to feel mueller more quickly, notice portion changes and eat less often between meals.  Patients are advised to eat slowly and purposefully. Give yourself less portions. You may find after starting this medication you have a new point of fullness. Maintain consistent eating schedule with meals +/- snacks and get in good hydration.    Dosing:  Initial dose: 2.5 mg injected subcutaneously once weekly for 4 weeks  Further dose changes can include: increase to 5 mg once weekly for 4 weeks, then 7.5 mg once weekly for 4 weeks, then 10 mg once weekly for 4 weeks then 12.5 mg once weekly for 4 weeks, then 15 mg (maximum dose) once weekly.    Dose changes are based on how you are tolerating the current dose, what benefits you are seeing at the current dose and if improvement in effectiveness of the drug is needed. The goal is to find the dose that works best for you with the least amount of side effects. You may find you reach this at a lower dose than the maximum dose.     Side effects: Common side effects include nausea, Heartburn,diarrhea, constipation. This is worse when starting the medication and with dose dose escalation.  It does improve with time. Stay adequately hydrated and eat small portions to decrease the risk of side effects.     The risk of pancreatitis (inflammation of the pancreas) has been associated with this type of medication, but is very rare.  If you have had pancreatitis in the past, this medication may not be for you. If you experience persistent severe abdominal pain (sometimes radiating to the back potentially accompanied by vomiting and have a fever), stop the medication and contact your provider immediately for assessment. They will do a blood test to check for pancreatitis.       Caution:   Tirzepatide (Zepbound, Mounjaro) May decrease effectiveness of your oral birth control while starting and with dose adjustments.  Use  backup forms of birth control if necessary.      For any questions or concerns please send a Fiiiling message to our team or call our weight management call center at [phone] during regular business hours.     There is a small chance you may have some low blood sugar after taking the medication.   The signs of low blood sugar are:  Weakness  Shaky   Hungry  Sweating  Confusion      See below for ways to treat low blood sugar without adding in lots of extra calories.      Treating Low Blood Sugar    If you have symptoms of low blood sugar (sweating, shaking, dizzy, confused) eat 15 grams of carbs and wait 15 minutes:    Glucose Tabs are best for sugars under 70 -  Dex4 or BD Glucose tablets are good, you will need to take 3-4 of these to equal 15 grams.     One small box of raisins  4 oz fruit juice box or   cup fruit juice  1 small apple  1 small banana    cup canned fruit in water    English muffin or a slice of bread with jelly   1 low fat frozen waffle with sugar-free syrup    cup cottage cheese with   cup frozen or fresh blueberries  1 cup skim or low-fat milk    cup whole grain cereal  4-6 crackers such as Triscuits      This medication is usually not covered by insurance and can be quite expensive. Sometimes a prior authorization is required, which may take up to 1-2 weeks for an insurance company to make a decision if they will cover the medication. Please be patient, you will be notified after a decision has been made.    Contact the nurse via Fiiiling or call [phone] if you have any questions or concerns. (Do not stop taking it if you don't think it's working. For some people it works without them knowing it.)     In order to get refills of this or any medication we prescribe you must be seen in the medical weight mgmt clinic every 2-4 months.    Using Your Mounjaro/Zepbound Pen  Medicine (tirzepatide)    Storing your pens  Store your pens in the refrigerator until you are ready to use them. Don't  let them freeze.  Your pen may be stored at room temperature for 21 days or fewer. Just make sure the temperature doesn't get higher than 86 or lower than 46 degrees Fahrenheit.   Protect the pens from light.  Never use any pens that have .    Check your pen before use:  The liquid in the pen window should be clear or light yellow. Bubbles are okay to see.   Do not use the pen if you can see the window contains any specks, is cloudy, or has changed color.  Make sure you have the medication and dose your health care provider prescribed.    Getting ready to inject:   1. Wash and dry your hands well.  2. Make sure the counter you use to place your supplies on is clean.  3. Make sure your injection time will not be interrupted by children or pets.  4. Have alcohol wipes or alcohol and cotton balls available to clean the injection site.   5. Choose your injection site. It can be on your stomach, back of upper arms, or upper legs. Remember to change your injection site each time you inject. Try to be at least 1 inch away from the previous one. Stay at least 1 inch away from your belly button.       Inject your dose:  1. Each pen is prefilled with one dose of medicine.    2. Pull off the grey cap at the bottom of the pen. Throw it in the trash. Do not put the cap back on the pen.   3. Make sure your pen is in the locked position. You will find the lock at the top of the pen.   4. Clean the injection site with alcohol.   5. Place the clear part of the pen against your skin. Unlock the pen by turning it to the unlock  position at the top.   6. Press and hold the purple injection button at the top of the pen. Listen for 2 clicks. The last click tells you the injection has been completed.   7. This injection is given 1 day a week. If you need to change the day you inject, there needs to be at least 3 days or 72 hours between the two doses.  8. If you miss a dose, you may take the dose within 4 days or 96 hours of the  missed dose.   9. Your injection may be best tolerated if given at night.     Disposing of your pens:  Dispose of your pens in a puncture-resistant container (hard plastic bottle) or Sharps container.  Check with the county you live in on how to dispose of the container.  Do not recycle the container with used pens.       Of note, you may not be able to  your medication right away. It may require a prior authorization from your insurance company. This may take a week or more.

## 2024-02-21 LAB — SARS-COV-2 RNA RESP QL NAA+PROBE: NEGATIVE

## 2024-02-27 ENCOUNTER — VIRTUAL VISIT (OUTPATIENT)
Dept: SURGERY | Facility: CLINIC | Age: 34
End: 2024-02-27
Payer: COMMERCIAL

## 2024-02-27 DIAGNOSIS — E66.01 OBESITY, CLASS III, BMI 40-49.9 (MORBID OBESITY) (H): Primary | ICD-10-CM

## 2024-02-27 PROCEDURE — 97802 MEDICAL NUTRITION INDIV IN: CPT | Mod: 95

## 2024-02-27 NOTE — PATIENT INSTRUCTIONS
"  Jhoan Conley!      It was great meeting with you today! Here are some links to the handouts I referenced:        Protein Sources:  [email or web address]     Fiber Sources:  [email or web address]     My Plate:  https://www.choosemyplate.gov/        A helpful search term to type into [employer], [employer], etc is \"myplate examples.\" [Link: MyPlate examples Google Search ]     Key points from today:  Eat 3 meals/day at consistent intervals  Shoot for 75-115g protein (20-30g/meal + snacks)  Aim for 25-35g fiber (5-10g/meal)  Eat slowly: 20-30 minutes per meal     Here is a summary of the goals that we discussed:     1. Eat balanced meals at regulate intervals  - Eat your first meal of the day within 1 hour of waking up. Then, eat every 4-6 hours.      2. Reduce restaurant food  - As discussed, let's aim for a limit of 1-2x/week       Let's plan on following up in 1-2 months. This can be scheduled via our call center at . Of course, reach out sooner if you have any questions or concerns. Have a great week and welcome to the program!        Maria Dolores Dickerson, DAVID, LD?  Clinical Dietitian   "

## 2024-02-27 NOTE — PROGRESS NOTES
"Yael is a 33 year old who is being evaluated via a billable video visit.      The patient has been notified of following:     \"This video visit will be conducted via a call between you and your physician/provider. We have found that certain health care needs can be provided without the need for an in-person physical exam.  This service lets us provide the care you need with a video conversation.  If a prescription is necessary we can send it directly to your pharmacy.  If lab work is needed we can place an order for that and you can then stop by our lab to have the test done at a later time.    Video visits are billed at different rates depending on your insurance coverage.  Please reach out to your insurance provider with any questions.    If during the course of the call the physician/provider feels a video visit is not appropriate, you will not be charged for this service.\"    Patient has given verbal consent for Video visit? Yes    How would you like to obtain your AVS? MyChart    If the video visit is dropped, the invitation should be resent by: Text to cell phone: [phone]    Will anyone else be joining your video visit? No    I    Video-Visit Details    Type of service:  Video Visit    Originating Location (pt. Location): Home    Distant Location (provider location):   Off-Site - Provider Home Office    Platform used for Video Visit: Up[employer]    Video Start Time:  1:24pm    Video End Time: 1:48pm    MEDICAL WEIGHT LOSS INITIAL EVALUATION  DIAGNOSIS:  Obesity Class III    NUTRITION HISTORY:  Diet and exercise history per provider visit on 2/20/2024 as follows:    Diet Recall      Wake Up: Around noon   Breakfast none - ate lunch instead    Lunch 1pm salad mixed greens - own dressing lemon juice/oil, bowtie pasta, chickpeas, juice and water    Supper 9:45 pm, chipotle chicken bowl/chips/queso, small 7 up can water    Bedtime:   Snack between meals:   Snack in evening:    3 am    none   no      Typical " "snacks: Not a snacking person   Caloric beverages per day. What type: Sprite or 7 up, will do a zero sugar juice recently   Water consumed daily: water   Low mulu/diet drinks:   Alcohol:   Foods you crave:    See above   Once or twice a month    Will crave eating out - really enjoys [employer] eats/door dash, closed out door dash 2 years ago            Sleep History     How many hours do you sleep at night? 7 hours - work every other week overnights at [employer]   Do you think you have sleep apnea?  Knows snores - sleep study 2 years ago negative          Who does the grocery shopping? Self and boyfriend   Who does the cooking? Self and boyfriend         Eating Disorder Screen     I binge eat Not sure - because eats infrequently so eats a larger portion due to hunger      I make myself vomit what I have eaten or use laxatives to get rid of food. no   I eat a large amount of food, like a loaf of bread, a box of cookies, a pint/quart of ice cream, all at once. no   I eat a large amount of food even when I am not hungry. no   I eat alone because I feel embarrassed and do not want others to see how much I have eaten. no   I eat until I am uncomfortably full. no   I feel bad, disgusted, or guilty after I overeat. no         Activity/Exercise History           How many times a week are you active for the purpose of exercise? For how long? Doesn't work out currently but job is active as a nursing assistant    What do you do for exercise?  Walking/lifting/rolling at work     Additional Information: Pt feels her biggest challenges are inconsistent meal intake (works double shifts as nursing assistant) as well as frequent restaurant food ([employer]). Zepbound prescribed at provider visit last week.     ANTHROPOMETRICS:  Height: 65.5\"   Weight: 294 lbs  BMI:  48.18 kg/m2  NUTRITION DIAGNOSIS:   Obese class III related to overeating and poor lifestyle habits as evidence by patient's subjective statements and  BMI of " 48.18 kg/m2   NUTRITION INTERVENTIONS  Nutrition Prescription:  Recommend modified energy- nutrient intake  Implementation:  Nutrition Education (Content):  Discussed portion sizes and plate method  Provided: Protein Sources for Weight Loss, Fiber Content in Food, Plate Method    Nutrition Education (Application):   Patient to practice goals as stated below  Patient verbalizes understanding of diet by stating she will limit restaurant food to 1-2x/week  Anticipate good compliance    Goals:  Eat balanced meals at regular intervals  Reduce restaurant food to 1-2x/week      FOLLOW UP AND MONITORING:    Other  - follow up in 4-8 weeks.     TIME SPENT WITH PATIENT:   24 minutes      Maria Dolores Dickerson RD, LD  Clinical Dietitian

## 2024-03-04 NOTE — TELEPHONE ENCOUNTER
PA already on file          Thank you,    rCistina Valdez  Oncology Pharmacy Liaison II  [email or web address]  Phone: [phone]  Fax: [phone]

## 2024-03-27 ENCOUNTER — MYC MEDICAL ADVICE (OUTPATIENT)
Dept: SURGERY | Facility: CLINIC | Age: 34
End: 2024-03-27
Payer: COMMERCIAL

## 2024-03-27 DIAGNOSIS — E66.01 OBESITY, CLASS III, BMI 40-49.9 (MORBID OBESITY) (H): ICD-10-CM

## 2024-04-03 ENCOUNTER — MYC MEDICAL ADVICE (OUTPATIENT)
Dept: SURGERY | Facility: CLINIC | Age: 34
End: 2024-04-03
Payer: COMMERCIAL

## 2024-04-03 DIAGNOSIS — E66.01 OBESITY, CLASS III, BMI 40-49.9 (MORBID OBESITY) (H): ICD-10-CM

## 2024-04-22 ENCOUNTER — OFFICE VISIT (OUTPATIENT)
Dept: URGENT CARE | Facility: URGENT CARE | Age: 34
End: 2024-04-22
Payer: COMMERCIAL

## 2024-04-22 VITALS
HEART RATE: 90 BPM | BODY MASS INDEX: 47.52 KG/M2 | TEMPERATURE: 97.3 F | OXYGEN SATURATION: 97 % | SYSTOLIC BLOOD PRESSURE: 119 MMHG | DIASTOLIC BLOOD PRESSURE: 72 MMHG | WEIGHT: 290 LBS

## 2024-04-22 DIAGNOSIS — R11.2 NAUSEA AND VOMITING, UNSPECIFIED VOMITING TYPE: Primary | ICD-10-CM

## 2024-04-22 LAB
ALBUMIN UR-MCNC: NEGATIVE MG/DL
APPEARANCE UR: CLEAR
BACTERIA #/AREA URNS HPF: ABNORMAL /HPF
BILIRUB UR QL STRIP: NEGATIVE
COLOR UR AUTO: YELLOW
GLUCOSE UR STRIP-MCNC: NEGATIVE MG/DL
HCG UR QL: NEGATIVE
HGB UR QL STRIP: NEGATIVE
KETONES UR STRIP-MCNC: NEGATIVE MG/DL
LEUKOCYTE ESTERASE UR QL STRIP: ABNORMAL
NITRATE UR QL: NEGATIVE
PH UR STRIP: 6 [PH] (ref 5–7)
RBC #/AREA URNS AUTO: ABNORMAL /HPF
SP GR UR STRIP: 1.02 (ref 1–1.03)
SQUAMOUS #/AREA URNS AUTO: ABNORMAL /LPF
UROBILINOGEN UR STRIP-ACNC: 1 E.U./DL
WBC #/AREA URNS AUTO: ABNORMAL /HPF

## 2024-04-22 PROCEDURE — 80053 COMPREHEN METABOLIC PANEL: CPT | Performed by: FAMILY MEDICINE

## 2024-04-22 PROCEDURE — 81025 URINE PREGNANCY TEST: CPT | Performed by: FAMILY MEDICINE

## 2024-04-22 PROCEDURE — 81001 URINALYSIS AUTO W/SCOPE: CPT | Performed by: FAMILY MEDICINE

## 2024-04-22 PROCEDURE — 99213 OFFICE O/P EST LOW 20 MIN: CPT | Performed by: FAMILY MEDICINE

## 2024-04-22 PROCEDURE — 36415 COLL VENOUS BLD VENIPUNCTURE: CPT | Performed by: FAMILY MEDICINE

## 2024-04-22 NOTE — PROGRESS NOTES
CC:   Chief Complaint   Patient presents with    Urgent Care     X3 Days nausea for a few days but better, vomiting about 6 times for one day, stomach cramps, fatigued, weakness, yesterday with deep breath chest tightness but better today, constipation/hard stools, mild cough   Taking increased fluids, resting         X3 Days nausea for a few days but better, vomiting about 6 times for one day,  food  stomach cramps,     Also fatigued, weakness,     yesterday with deep breath chest tightness but better today, constipation/hard stools, mild cough /Taking increased fluids, resting    Feeling a bit better today    No diarrhea    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     I have reviewed the patient's medical history in detail; there are no changes to the history as noted in EpicCare.    ROS: As per HPI.      EXAM  /72   Pulse 90   Temp 97.3  F (36.3  C) (Tympanic)   Wt 131.5 kg (290 lb)   SpO2 97%   BMI 47.52 kg/m    Gen: Healthy appearing female in no apparent distress  Abdomen: Soft, non-tender, non-distended No rebound or guarding. No palpable organomegaly.  Skin: No rashes    Results for orders placed or performed in visit on 04/22/24   UA with Microscopic reflex to Culture - lab collect     Status: Abnormal    Specimen: Urine, Midstream   Result Value Ref Range    Color Urine Yellow Colorless, Straw, Light Yellow, Yellow    Appearance Urine Clear Clear    Glucose Urine Negative Negative mg/dL    Bilirubin Urine Negative Negative    Ketones Urine Negative Negative mg/dL    Specific Gravity Urine 1.025 1.003 - 1.035    Blood Urine Negative Negative    pH Urine 6.0 5.0 - 7.0    Protein Albumin Urine Negative Negative mg/dL    Urobilinogen  Urine 1.0 0.2, 1.0 E.U./dL    Nitrite Urine Negative Negative    Leukocyte Esterase Urine Trace (A) Negative   HCG qualitative urine     Status: Normal   Result Value Ref Range    hCG Urine Qualitative Negative Negative   UA Microscopic with Reflex to Culture     Status: Abnormal   Result Value Ref Range    Bacteria Urine Few (A) None Seen /HPF    RBC Urine None Seen 0-2 /HPF /HPF    WBC Urine 0-5 0-5 /HPF /HPF    Squamous Epithelials Urine Few (A) None Seen /LPF    Narrative    Urine Culture not indicated       ASSESSMENT/PLAN:    ICD-10-CM    1. Nausea and vomiting, unspecified vomiting type  R11.2 UA with Microscopic reflex to Culture - lab collect     HCG qualitative urine     Comprehensive metabolic panel (BMP + Alb, Alk Phos, ALT, AST, Total. Bili, TP)     UA with Microscopic reflex to Culture - lab collect     HCG qualitative urine     Comprehensive metabolic panel (BMP + Alb, Alk Phos, ALT, AST, Total. Bili, TP)     UA Microscopic with Reflex to Culture        Overall negative urine and pregnancy test  We will do some follow-up comprehensive metabolic panel but otherwise I think most likely diagnosis is a acute gastroenteritis    Standard information per diagnosis given with instructions on home treatment including OTC medications and follow up.    Jose Velasco MD MPH

## 2024-04-23 LAB
ALBUMIN SERPL BCG-MCNC: 4.2 G/DL (ref 3.5–5.2)
ALP SERPL-CCNC: 71 U/L (ref 40–150)
ALT SERPL W P-5'-P-CCNC: 13 U/L (ref 0–50)
ANION GAP SERPL CALCULATED.3IONS-SCNC: 11 MMOL/L (ref 7–15)
AST SERPL W P-5'-P-CCNC: 15 U/L (ref 0–45)
BILIRUB SERPL-MCNC: 0.3 MG/DL
BUN SERPL-MCNC: 13.7 MG/DL (ref 6–20)
CALCIUM SERPL-MCNC: 8.6 MG/DL (ref 8.6–10)
CHLORIDE SERPL-SCNC: 104 MMOL/L (ref 98–107)
CREAT SERPL-MCNC: 0.8 MG/DL (ref 0.51–0.95)
DEPRECATED HCO3 PLAS-SCNC: 22 MMOL/L (ref 22–29)
EGFRCR SERPLBLD CKD-EPI 2021: >90 ML/MIN/1.73M2
GLUCOSE SERPL-MCNC: 92 MG/DL (ref 70–99)
POTASSIUM SERPL-SCNC: 3.5 MMOL/L (ref 3.4–5.3)
PROT SERPL-MCNC: 7.9 G/DL (ref 6.4–8.3)
SODIUM SERPL-SCNC: 137 MMOL/L (ref 135–145)

## 2024-05-01 DIAGNOSIS — E66.01 OBESITY, CLASS III, BMI 40-49.9 (MORBID OBESITY) (H): ICD-10-CM

## 2024-05-01 RX ORDER — TIRZEPATIDE 2.5 MG/.5ML
INJECTION, SOLUTION SUBCUTANEOUS
Qty: 2 ML | Refills: 0 | OUTPATIENT
Start: 2024-05-01

## 2024-05-20 NOTE — PROGRESS NOTES
"Yael is a 33 year old who is being evaluated via a billable video visit.      The patient has been notified of following:     \"This video visit will be conducted via a call between you and your physician/provider. We have found that certain health care needs can be provided without the need for an in-person physical exam.  This service lets us provide the care you need with a video conversation.  If a prescription is necessary we can send it directly to your pharmacy.  If lab work is needed we can place an order for that and you can then stop by our lab to have the test done at a later time.    Video visits are billed at different rates depending on your insurance coverage.  Please reach out to your insurance provider with any questions.    If during the course of the call the physician/provider feels a video visit is not appropriate, you will not be charged for this service.\"    Patient has given verbal consent for Video visit? Yes    How would you like to obtain your AVS? MyChart    If the video visit is dropped, the invitation should be resent by: Text to cell phone: [phone]    Will anyone else be joining your video visit? No    I    Video-Visit Details    Type of service:  Video Visit    Originating Location (pt. Location): Home    Distant Location (provider location):   [facility]    Platform used for Video Visit: [employer]    Video Start Time: 10:02 AM    Video End Time: 10:22 AM          Return Medical Weight Management Note       Yael Johnson  MRN:  [id number]  :  1990  VINCENT:  2024      Dear Angela Pelayo MD,    I had the pleasure of seeing your patient Yael Johnson. She is a 33 year old female who I am continuing to see for treatment of obesity.      Assessment   Problem List Items Addressed This Visit       Morbid obesity (H) - Primary    Relevant Medications    Semaglutide-Weight Management (WEGOVY) 1 MG/0.5ML pen    Semaglutide-Weight " "Management (WEGOVY) 1.7 MG/0.75ML pen        PLAN/DISCUSSION:  We discussed the role of pharmacological agents in the treatment of obesity and the \"off-label\" use of medications in this practice. We discussed the risks and benefits of each. We discussed indications, contraindications, potential side effects, and estimated costs of each. Discussed that medications must always be used together with lifestyle changes such as improvements in diet choices, portion control and establishing and maintaining a regular exercise program.     Will bridge over from zepbound to wegovy due to supply issues. Patient to complete her month of zepbound 5.0 mg and then start wegovy 1.0 mg.      Plan:  Have breakfast daily  Drink 64 oz water daily  3. Continue medication      FOLLOW-UP:  September      SUBJECTIVE/OBJECTIVE:  Patient seen initially 2/20/2024 by Sulema Esquivel PA-C and was started on zepbound. Unfortunately has run into supply issues. So repeated starting dose. Is going to take 8th dose of zepbound 2.5 mg today. Has a month of the 5.0 mg doses in her possession. Tolerating very well. Initially had more appetite suppression and was not thinking about eating. Not consumed by food. Changed what she was craving.     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 Has recently moved and broke up a 4 year relationship. Knows this will be important for weight loss    Anti-obesity medications:   Current: zepbound 2.5 mg  on Wednesdays  Side effects:  Denies nausea, vomiting, abdominal pain, severe constipation, diarrhea, or heartburn    Antiobesity medication history:  Phentermine (Lomaira, Adipex) Anxiety:   Yes - situational Heart palpitations    Phentermine/Topiramate (Qsymia)    Bupropion/Naltrexone (Contrave)    Liraglutide (Saxenda)    Semaglutide (Wegovy) Appear covered   Tirzepatide (Zepbound) Currently taking   Orlistat (Xenical/Tino)    Off-Label Medications for Obesity  Semaglutide (Ozempic)    Tirzepatide (Mounjaro)  "   Bupropion (Wellbutrin) Depression:Yes - some in the past, situational anxiety    Metformin(Glucophage)    Topiramate (Topamax) None   Naltexone              5/21/2024    12:41 PM   Weight Loss Medication History Reviewed With Patient   Which weight loss medications are you currently taking on a regular basis? None   If you are not taking a weight loss medication that was prescribed to you, please indicate why: Other   Are you having any side effects from the weight loss medication that we have prescribed you? No       Recent diet changes: Fluids: 64 oz water daily.   Eats 2 meals daily   Wakes up 10 am  B: can be around noon    Recent exercise/activity changes: Has not been working out outside of work as CNA    CURRENT WEIGHT:   284 lbs 0 oz    Initial Weight (lbs): 294 lbs     Cumulative weight loss (lbs): 10  Weight Loss Percentage: 3.4%        5/21/2024    12:41 PM   Changes and Difficulties   I have made the following changes to my diet since my last visit: I limited eating out fast food to maybe 3 times a week instead of every other day. I cook more. And i eat more protein & veggies.   With regards to my diet, I am still struggling with: Drinking water, meal prepping, eating every four hours.   I have made the following changes to my activity/exercise since my last visit: I havent been exercising but i plan to this week when i can get a gym membership.   With regards to my activity/exercise, I am still struggling with: Actually exercising         MEDICATIONS:   Current Outpatient Medications   Medication Sig Dispense Refill    fluticasone-salmeterol (ADVAIR) 100-50 MCG/ACT inhaler Inhale 1 puff into the lungs every 12 hours 3 each 1    Semaglutide-Weight Management (WEGOVY) 1 MG/0.5ML pen Inject 1 mg Subcutaneous every 7 days 2 mL 1    Semaglutide-Weight Management (WEGOVY) 1.7 MG/0.75ML pen Inject 1.7 mg Subcutaneous every 7 days 3 mL 0    tirzepatide-Weight Management (ZEPBOUND) 2.5 MG/0.5ML prefilled pen  "Inject 0.5 mLs (2.5 mg) Subcutaneous every 7 days 2 mL 0    tirzepatide-Weight Management (ZEPBOUND) 5 MG/0.5ML prefilled pen Inject 0.5 mLs (5 mg) Subcutaneous every 7 days (Patient not taking: Reported on 5/21/2024) 2 mL 0         ROS:  General  Fatigue: No  Sleep Quality: OK      PHYSICAL EXAM:  Objective    Ht 5' 5.5\" (1.664 m)   Wt 284 lb (128.8 kg)   BMI 46.54 kg/m    GENERAL: Healthy, alert and no distress  EYES: Eyes grossly normal to inspection.  No discharge or erythema, or obvious scleral/conjunctival abnormalities.  RESP: No audible wheeze, cough, or visible cyanosis.  No visible retractions or increased work of breathing.    SKIN: Visible skin clear. No significant rash, abnormal pigmentation or lesions.  NEURO: Cranial nerves grossly intact.  Mentation and speech appropriate for age.  PSYCH: Mentation appears normal, affect normal/bright, judgement and insight intact, normal speech and appearance well-groomed.        Sincerely,    Latrice Cottrell PA-C    25 minutes spent on the date of the encounter doing chart review, review of test results, patient visit and documentation       "

## 2024-05-22 ENCOUNTER — VIRTUAL VISIT (OUTPATIENT)
Dept: SURGERY | Facility: CLINIC | Age: 34
End: 2024-05-22
Payer: COMMERCIAL

## 2024-05-22 VITALS — BODY MASS INDEX: 45.64 KG/M2 | HEIGHT: 66 IN | WEIGHT: 284 LBS

## 2024-05-22 DIAGNOSIS — E66.01 MORBID OBESITY (H): Primary | ICD-10-CM

## 2024-05-22 PROCEDURE — 99213 OFFICE O/P EST LOW 20 MIN: CPT | Mod: 95 | Performed by: PHYSICIAN ASSISTANT

## 2024-05-22 RX ORDER — SEMAGLUTIDE 1 MG/.5ML
1 INJECTION, SOLUTION SUBCUTANEOUS
Qty: 2 ML | Refills: 1 | Status: SHIPPED | OUTPATIENT
Start: 2024-05-22 | End: 2024-08-06

## 2024-05-22 RX ORDER — SEMAGLUTIDE 1.7 MG/.75ML
1.7 INJECTION, SOLUTION SUBCUTANEOUS
Qty: 3 ML | Refills: 0 | Status: SHIPPED | OUTPATIENT
Start: 2024-05-22 | End: 2024-08-06

## 2024-05-22 NOTE — PATIENT INSTRUCTIONS
"Nice to talk with you today! Thank you for allowing me the privilege of caring for you.   We hope we provided you with the excellent service you deserve.     To ensure the quality of our services you may receive a patient satisfaction survey from an independent monitoring company.  The greatest compliment you can give is \"Likely to Recommend\"      Below is our plan we discussed.-  GIOVANNY Pollard      Plan:  Have breakfast daily  Drink 64 oz water daily  3.  Continue medication    Please call [phone] and schedule a follow up with Sulema Esquivel PA-C or Latrice Cottrell PA-C in 3 months.  If you need to reach me sooner you can do so by calling [phone].    Have a great day!         WEGOVY (semaglutide)      Wegovy (semaglutide) injection 2.4 mg is an injectable prescription medicine used for adults with obesity (BMI ?30) or overweight (excess weight) (BMI ?27) who also have weight-related medical problems to help them lose weight and keep the weight off.  It is a GLP-1 agonist medication. GLP1 agonists stimulate the hormone GLP-1 in your body to allow you to feel full quickly and stay full longer.    Due to the shortage, You may need to be persistent and patient to get these initial dosages due to the shortage.  Once you are able to obtain the 0.25 and 0.5 mg dose \"8 weeks of therapy\" you can begin treatment.     Directions:  Start Wegovy (semaglutide) 0.25 mg once weekly for 4 weeks, then if tolerating increase to 0.5 mg weekly for 4 weeks, then if tolerating increase to 1 mg weekly for 4 weeks, then if tolerating increase to 1.7 mg weekly for 4 weeks, then if tolerating increase to 2.4 mg weekly thereafter.      -Each Wegovy pen is a once weekly single-dose prefilled pen with a pen injector already built within the pen. Discard the Wegovy pen after use in sharps container.     Common Side Effects:    nausea, headache, diarrhea, stomach upset.  If these become unmanageable call or mychart.    Serious Side effects: "   Pancreatitis (inflammation of the pancreas) has been associated with this type of medication, but is very rare.Symptoms of pancreatitis include: Pain in your upper stomach area which may travel to your back and be worse after eating.     Storage:   Store the prescription in the refrigerator. Once it is time to use the Wegovy pen, you can keep the pen at room temperature and it is good for up to 28 days at room temperature.     How to inject:  For a video on how to use the pen please go to:  https://www.[employer]/about-wegovy/how-to-use-the-wegovy-pen.html#itemTwo       For any questions or concerns please send a [employer] message to our team or call  [phone].  For emergencies please 911 or seek immediate medical care.

## 2024-06-25 ENCOUNTER — TELEPHONE (OUTPATIENT)
Dept: FAMILY MEDICINE | Facility: CLINIC | Age: 34
End: 2024-06-25
Payer: COMMERCIAL

## 2024-06-25 ENCOUNTER — MYC MEDICAL ADVICE (OUTPATIENT)
Dept: FAMILY MEDICINE | Facility: CLINIC | Age: 34
End: 2024-06-25
Payer: COMMERCIAL

## 2024-06-25 DIAGNOSIS — Z11.1 SCREENING EXAMINATION FOR PULMONARY TUBERCULOSIS: Primary | ICD-10-CM

## 2024-06-25 NOTE — TELEPHONE ENCOUNTER
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 [facility]    Does patient have an appointment scheduled?: Yes: Lab appointment on 6/27    Where to send orders: Place orders within Epic    Could we send this information to you in [email or web address] or would you prefer to receive a phone call?:   No preference   Okay to leave a detailed message?: Yes at Home number on file [phone] (home)

## 2024-06-27 ENCOUNTER — LAB (OUTPATIENT)
Dept: LAB | Facility: CLINIC | Age: 34
End: 2024-06-27
Payer: COMMERCIAL

## 2024-06-27 DIAGNOSIS — Z11.1 SCREENING EXAMINATION FOR PULMONARY TUBERCULOSIS: ICD-10-CM

## 2024-06-27 DIAGNOSIS — Z01.84 IMMUNITY STATUS TESTING: ICD-10-CM

## 2024-06-27 PROCEDURE — 86787 VARICELLA-ZOSTER ANTIBODY: CPT

## 2024-06-27 PROCEDURE — 36415 COLL VENOUS BLD VENIPUNCTURE: CPT

## 2024-06-27 PROCEDURE — 86481 TB AG RESPONSE T-CELL SUSP: CPT

## 2024-06-28 LAB
QUANTIFERON MITOGEN: 10 IU/ML
QUANTIFERON NIL TUBE: 0 IU/ML
QUANTIFERON TB1 TUBE: 0.02 IU/ML
QUANTIFERON TB2 TUBE: 0.01
VZV IGG SER QL IA: 1243 INDEX
VZV IGG SER QL IA: POSITIVE

## 2024-06-28 NOTE — RESULT ENCOUNTER NOTE
Hello -    Here are my comments about your recent results:  Notes immune to chicken pox  Please let us know if you have any questions or concerns.     Regards,  Angela Pelayo MD

## 2024-06-28 NOTE — TELEPHONE ENCOUNTER
Pt calling to inquire if Quantiferon was done as she saw results for varicella bur wasn't aware that was ordered.  Assured that Quantiferon is in process.    Tomasa MATOS, BSN, PHN, RN  [facility]  [phone]

## 2024-06-29 LAB
GAMMA INTERFERON BACKGROUND BLD IA-ACNC: 0 IU/ML
M TB IFN-G BLD-IMP: NEGATIVE
M TB IFN-G CD4+ BCKGRND COR BLD-ACNC: 10 IU/ML
MITOGEN IGNF BCKGRD COR BLD-ACNC: 0.01 IU/ML
MITOGEN IGNF BCKGRD COR BLD-ACNC: 0.02 IU/ML

## 2024-06-29 NOTE — RESULT ENCOUNTER NOTE
Hello -    Here are my comments about your recent results:  Negative quantiferon    Please let us know if you have any questions or concerns.     Regards,  Angela Pelayo MD

## 2024-07-09 ENCOUNTER — PATIENT OUTREACH (OUTPATIENT)
Dept: CARE COORDINATION | Facility: CLINIC | Age: 34
End: 2024-07-09
Payer: COMMERCIAL

## 2024-07-18 ENCOUNTER — TELEPHONE (OUTPATIENT)
Dept: SURGERY | Facility: CLINIC | Age: 34
End: 2024-07-18
Payer: COMMERCIAL

## 2024-07-18 NOTE — TELEPHONE ENCOUNTER
Medication Question or Refill        What medication are you calling about (include dose and sig)?: Wegovy    Preferred Pharmacy:       [facility]/pharmacy #[id number] - [place], [place] - [address]  [address] [address]  Phone: [phone] Fax: [phone]      Controlled Substance Agreement on file:   CSA -- Patient Level:    CSA: None found at the patient level.       Who prescribed the medication?: Latrice Cottrell    Do you need a refill? No    When did you use the medication last? N/a    Patient offered an appointment? No    Do you have any questions or concerns?  Yes: patients insurance has changed & her new insurance isn't covering weight loss medication per pt. She is inquiring about getting it covered due to heart palpitations.      Could we send this information to you in ISpeak or would you prefer to receive a phone call?:   Patient would like to be contacted via ISpeak   
Will reach out via [employer].    Rebecca MCDERMOTT RN    
99.2

## 2024-07-23 ENCOUNTER — PATIENT OUTREACH (OUTPATIENT)
Dept: CARE COORDINATION | Facility: CLINIC | Age: 34
End: 2024-07-23
Payer: COMMERCIAL

## 2024-07-25 ENCOUNTER — TELEPHONE (OUTPATIENT)
Dept: FAMILY MEDICINE | Facility: CLINIC | Age: 34
End: 2024-07-25

## 2024-07-25 ENCOUNTER — VIRTUAL VISIT (OUTPATIENT)
Dept: FAMILY MEDICINE | Facility: CLINIC | Age: 34
End: 2024-07-25
Payer: COMMERCIAL

## 2024-07-25 DIAGNOSIS — J35.1 ENLARGED TONSILS: ICD-10-CM

## 2024-07-25 DIAGNOSIS — F33.40 MDD (RECURRENT MAJOR DEPRESSIVE DISORDER) IN REMISSION (H): ICD-10-CM

## 2024-07-25 DIAGNOSIS — Z83.3 FAMILY HISTORY OF DIABETES MELLITUS: ICD-10-CM

## 2024-07-25 DIAGNOSIS — Z88.9 HX OF ALLERGIC REACTION: ICD-10-CM

## 2024-07-25 DIAGNOSIS — E66.01 MORBID OBESITY (H): ICD-10-CM

## 2024-07-25 DIAGNOSIS — Z86.59 HISTORY OF ADHD: ICD-10-CM

## 2024-07-25 DIAGNOSIS — N92.6 IRREGULAR MENSTRUAL CYCLE: ICD-10-CM

## 2024-07-25 DIAGNOSIS — M79.605 ACHING LEG SYNDROME OF LEFT LOWER EXTREMITY: ICD-10-CM

## 2024-07-25 DIAGNOSIS — R06.83 SNORING: ICD-10-CM

## 2024-07-25 DIAGNOSIS — J45.40 MODERATE PERSISTENT ASTHMA WITHOUT COMPLICATION: Primary | ICD-10-CM

## 2024-07-25 DIAGNOSIS — F41.1 GAD (GENERALIZED ANXIETY DISORDER): ICD-10-CM

## 2024-07-25 DIAGNOSIS — I87.2 VENOUS INCOMPETENCE: ICD-10-CM

## 2024-07-25 PROBLEM — S83.002S: Status: RESOLVED | Noted: 2019-12-20 | Resolved: 2024-07-25

## 2024-07-25 PROCEDURE — 99215 OFFICE O/P EST HI 40 MIN: CPT | Mod: 95 | Performed by: FAMILY MEDICINE

## 2024-07-25 PROCEDURE — G2211 COMPLEX E/M VISIT ADD ON: HCPCS | Mod: 95 | Performed by: FAMILY MEDICINE

## 2024-07-25 PROCEDURE — 96127 BRIEF EMOTIONAL/BEHAV ASSMT: CPT | Mod: 95 | Performed by: FAMILY MEDICINE

## 2024-07-25 RX ORDER — NORGESTIMATE AND ETHINYL ESTRADIOL 0.25-0.035
1 KIT ORAL DAILY
COMMUNITY
Start: 2024-07-22 | End: 2024-08-07

## 2024-07-25 RX ORDER — FLUTICASONE PROPIONATE AND SALMETEROL 100; 50 UG/1; UG/1
1 POWDER RESPIRATORY (INHALATION) EVERY 12 HOURS
Qty: 3 EACH | Refills: 1 | Status: CANCELLED | OUTPATIENT
Start: 2024-07-25

## 2024-07-25 RX ORDER — ALBUTEROL SULFATE AND BUDESONIDE 90; 80 UG/1; UG/1
1 AEROSOL, METERED RESPIRATORY (INHALATION) 2 TIMES DAILY
Qty: 10.7 G | Refills: 11 | Status: SHIPPED | OUTPATIENT
Start: 2024-07-25 | End: 2024-08-07

## 2024-07-25 ASSESSMENT — ASTHMA QUESTIONNAIRES
QUESTION_2 LAST FOUR WEEKS HOW OFTEN HAVE YOU HAD SHORTNESS OF BREATH: ONCE OR TWICE A WEEK
QUESTION_4 LAST FOUR WEEKS HOW OFTEN HAVE YOU USED YOUR RESCUE INHALER OR NEBULIZER MEDICATION (SUCH AS ALBUTEROL): ONCE A WEEK OR LESS
ACT_TOTALSCORE: 21
QUESTION_5 LAST FOUR WEEKS HOW WOULD YOU RATE YOUR ASTHMA CONTROL: SOMEWHAT CONTROLLED
QUESTION_3 LAST FOUR WEEKS HOW OFTEN DID YOUR ASTHMA SYMPTOMS (WHEEZING, COUGHING, SHORTNESS OF BREATH, CHEST TIGHTNESS OR PAIN) WAKE YOU UP AT NIGHT OR EARLIER THAN USUAL IN THE MORNING: NOT AT ALL
QUESTION_1 LAST FOUR WEEKS HOW MUCH OF THE TIME DID YOUR ASTHMA KEEP YOU FROM GETTING AS MUCH DONE AT WORK, SCHOOL OR AT HOME: NONE OF THE TIME

## 2024-07-25 ASSESSMENT — PATIENT HEALTH QUESTIONNAIRE - PHQ9
10. IF YOU CHECKED OFF ANY PROBLEMS, HOW DIFFICULT HAVE THESE PROBLEMS MADE IT FOR YOU TO DO YOUR WORK, TAKE CARE OF THINGS AT HOME, OR GET ALONG WITH OTHER PEOPLE: NOT DIFFICULT AT ALL
SUM OF ALL RESPONSES TO PHQ QUESTIONS 1-9: 4
SUM OF ALL RESPONSES TO PHQ QUESTIONS 1-9: 4

## 2024-07-25 NOTE — PROGRESS NOTES
The below note was dictated using voice recognition. Although reviewed after completion, some word and grammatical error may remain .       Yael is a 33 year old who is being evaluated via a billable video visit.    How would you like to obtain your AVS? Uriel  If the video visit is dropped, the invitation should be resent by: Text to cell phone: [phone]  Will anyone else be joining your video visit? No      Assessment & Plan     Moderate persistent asthma without complication  Here in a virtual apt for asthma.  Last seen in August and advised follow-up in 3 months but due to insurance changes not seen till now.  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  Asthma no longer on Advair since lost insurance. Was using Wixela.  Did not try Singulair due to cost has not been using her antihistamine in a while, in 8/2023 when seen given new asthma guidelines was prescribe Symbicort 160/4.5,  2 puffs twice a day and additional 1 to 2 puffs as needed for flare up no more than 12 total puffs a day.  But reported this was not covered and was put back on Wixela./Advair.  ACT equals 21.  Reports still occasional shortness of breath felt better on Advair discus.  Understands the medication she has been given is the same ingredients as Advair Diskus.  Unclear what insurance will cover.  Went over given new guidelines and agreeable to trying airsupra instead for both controller & rescue.  Has not seen a pulmonologist in a while and agreeable to a referral.  Reminded about getting Prevnar 20.  Is hesitant to get the COVID-vaccine as felt it messed up her asthma after the second shot.  Currently reports struggling with allergies past 1 month.  Advised to take Claritin daily and discussed using Astelin nasal spray over-the-counter to help with symptoms.Try airsupra for both controlling and rescue  symptoms management for asthma. See pulmonary for asthma optimization. Asthma action plan in place     History of allergic reaction, in 2022 seen in ER for facial swelling given an EpiPen to use but never picked up, suspected due to soy, never tested, no recurrence, has been avoiding soy products.  Previously reported had access to boyfriend's EpiPen but currently has broken up with prior boyfriend.  Offered to prescribe an EpiPen but declined but agreeable to an allergy referral.  And this was placed.    History of snoring, reported prior sleep study done as part of bariatric surgery work-up in [facility] in 2021 had been unremarkable. Living with dad and step mom currently and unknown if she snores.  Weight loss and sleeping on side encouraged.     Has had enlarged tonsils in the past that she feels is unchanged & not bothersome    BMI more than 49/ fh of dm  has tried weight watchers in the past.  Previously seen by bariatrics and cleared for surgery in 2021 at [facility] but opted then not to go that route. Had gained some more weight  & referred to the weight specialist in 2023 to explore meds. for injectable med evaluation.  Seen by the weight clinic 2/26/2020 for the dietitian 2/27.  Started on zepbound.  Developed itching at site of injection and then insurance also did not cover med anymore.  Seen in urgent care for nausea vomiting April.  In May zeppound was changed to Wegovy but has not started that yet is not covered by insurance. New insurance not covering weight loss meds and currently not on any injectable.  While Wegovy med is on file she has had difficulty getting it due to insurance and difficulty getting back in with the weight clinic.    Diff getting in with wt. will plan to do labs when returns for a physical.  Encouraged to connect with her weight doctor in the meantime, continue to eat a healthy diet and exercise regularly & return for an in person physical to get lab work done.    History of venous  incompetence and dependent edema.  Did not do the venous incompetence ultrasound ordered in the past and recommended to try compression socks  & was given a prescription for knee-high 15 to 20 mmHg socks to get at a home medical store after being measured by them.  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  Reports no swelling.  Has not tried the compression socks yet and encouraged to give it a try & can do further evaluation when seen if no help after that.  May help to wear compression socks on her long flight.  Noted prior history of left subluxation patella no longer an issue    Hx of irregular menstrual cycles improved when went off the birth control in the past. Reported ultrasound done in [facility] January 2022 showed fibroids.  She did see gynecology at [facility] on 8/29 and a Pap done was normal and an ultrasound was ordered for fibroids but due to insurance issues will be now getting it in October.    Noted she has been prescribed a combined estrogen containing birth control by her gynecologist.  Reports it is for a trip in September when she is going to [place] with friends for vacation and plans to not have her menstrual cycle at that time.  Counseled with BMI and long flight at risk of DVT and may have lower risk with a progesterone only medication and encouraged she contact her gynecologist to see if this option would be possible. Stay well hydrated and exercise regularly on flight. Recommend seeing the travel clinic for upcoming sept trip to [facility]    Also discussed may be best to see the travel clinic for international vacation starting September 19      Depression in remission. Feels while still dealing with heart break, is safe.  Generalized anxiety remains.  Currently declines need for counseling or medications.    History of ADHD diagnosed in 2021 was seeing a psychiatrist and  given Ritalin to try but did not like the anxiety related to the med and worried about the side effects & feels has been doing well without it.  History of intermittent pinching left-sided chest wall pain evaluated by cardiology and many times in urgent care and ER in the past assessed to be noncardiac has not occurred in a long time feels due to weight loss and being more active has not experienced it in a while.      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 No new breast concerns. Screening Mammograms starting age 40.    Return for an in person physical in the near future.  - Albuterol-Budesonide (AIRSUPRA) 90-80 MCG/ACT AERO; Inhale 1 puff into the lungs 2 times daily  - Adult Pulmonary Medicine  Referral; Future  - PRIMARY CARE FOLLOW-UP SCHEDULING; Future    Hx of allergic reaction  History of allergic reaction, in 2022 seen in ER for facial swelling given an EpiPen to use but never picked up, suspected due to soy, never tested, no recurrence, has been avoiding soy products.  Previously reported had access to boyfriend's EpiPen but currently has broken up with prior boyfriend.  Offered to prescribe an EpiPen but declined but agreeable to an allergy referral.  And this was placed.  - Adult Allergy/Asthma  Referral; Future    Snoring  History of snoring, reported prior sleep study done as part of bariatric surgery work-up in [facility] in 2021 had been unremarkable. Living with dad and step mom currently and unknown if she snores.  Weight loss and sleeping on side encouraged.    Enlarged tonsils  Has had enlarged tonsils in the past that she feels is unchanged & not bothersome    Morbid obesity (H)  Family history of diabetes mellitus  BMI more than 49/ fh of dm  has tried weight watchers in the past.  Previously seen by bariatrics and  cleared for surgery in 2021 at [facility] but opted then not to go that route. Had gained some more weight  & referred to the weight specialist in 2023 to explore meds. for injectable med evaluation.  Seen by the weight clinic 2/26/2020 for the dietitian 2/27.  Started on zepbound.  Developed itching at site of injection and then insurance also did not cover med anymore.  Seen in urgent care for nausea vomiting April.  In May zeppound was changed to Wegovy but has not started that yet is not covered by insurance. New insurance not covering weight loss meds and currently not on any injectable.  While Wegovy med is on file she has had difficulty getting it due to insurance and difficulty getting back in with the weight clinic.    Diff getting in with wt. will plan to do labs when returns for a physical.  Encouraged to connect with her weight doctor in the meantime, continue to eat a healthy diet and exercise regularly & return for an in person physical to get lab work done.    Venous incompetence  Aching leg syndrome of left lower extremity  History of venous incompetence and dependent edema.  Did not do the venous incompetence ultrasound ordered in the past and recommended to try compression socks  & was given a prescription for knee-high 15 to 20 mmHg socks to get at a home medical store after being measured by them.  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  Reports no swelling.  Has not tried the compression socks yet and encouraged to give it a try & can do further evaluation when seen if no help after that.  May help to wear compression socks on her long flight.  Noted prior history of left subluxation patella no longer an issue    Irregular menstrual cycle  Hx of irregular menstrual cycles improved when went off the birth control in the past. Reported ultrasound done in [facility] January 2022  showed fibroids.  She did see gynecology at [facility] on 8/29 and a Pap done was normal and an ultrasound was ordered for fibroids but due to insurance issues will be now getting it in October.    Noted she has been prescribed a combined estrogen containing birth control by her gynecologist.  Reports it is for a trip in September when she is going to [facility] with friends for vacation and plans to not have her menstrual cycle at that time.  Counseled with BMI and long flight at risk of DVT and may have lower risk with a progesterone only medication and encouraged she contact her gynecologist to see if this option would be possible. Stay well hydrated and exercise regularly on flight. Recommend seeing the travel clinic for upcoming sept trip to [facility]    MDD (recurrent major depressive disorder) in remission (H24)  JAYE (generalized anxiety disorder)- Adult Mental Health  Referral; Future  History of ADHD  Depression in remission. Feels while still dealing with heart break, is safe.  Generalized anxiety remains.  Currently declines need for counseling or medications.    History of ADHD diagnosed in 2021 was seeing a psychiatrist and given Ritalin to try but did not like the anxiety related to the med and worried about the side effects & feels has been doing well without it.  History of intermittent pinching left-sided chest wall pain evaluated by cardiology and many times in urgent care and ER in the past assessed to be noncardiac has not occurred in a long time feels due to weight loss and being more active has not experienced it in a while.      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 No new breast concerns. Screening Mammograms starting age 40.    Discussed may be best to see the travel clinic for international vacation starting September 19  "    Return for an in person physical in the near future.    BMI  Estimated body mass index is 46.54 kg/m  as calculated from the following:    Height as of 5/21/24: 1.664 m (5' 5.5\").    Weight as of 5/22/24: 128.8 kg (284 lb).   Weight management plan: Patient referred to endocrine and/or weight management specialty Discussed healthy diet and exercise guidelines      Work on weight loss  Regular exercise  See Patient Instructions  The longitudinal plan of care for the diagnosis(es)/condition(s) as documented were addressed during this visit. Due to the added complexity in care, I will continue to support Yael in the subsequent management and with ongoing continuity of care.    Subjective   Yael is a 33 year old, presenting for the following health issues:  Asthma        7/25/2024     7:12 AM   Additional Questions   Roomed by Sophie ARIAS     History of Present Illness     Asthma:  She presents for follow up of asthma.  She has no cough, no wheezing, and no shortness of breath. She is not using a relief medication.  She typically misses taking her controller medication 2 time(s) per week. Patient is aware of the following triggers: same as previous visit. The patient has not had a visit to the Emergency Room, Urgent Care or Hospital due to asthma since the last clinic visit.     She eats 2-3 servings of fruits and vegetables daily.She consumes 0 sweetened beverage(s) daily.She exercises with enough effort to increase her heart rate 9 or less minutes per day.  She exercises with enough effort to increase her heart rate 3 or less days per week. She is missing 1 dose(s) of medications per week.  She is not taking prescribed medications regularly due to other.     BACKGROUND  Hx of morbid obesity, BMI > 49, snoring ( reported sleep study neg 2021 In [facility]),enlarged tonsils, moderate persistent asthma prev on albuterol inhaler & neb prn & Advair 100/50 bid, allergic to cats & dogs, FH of diabetes, venous incompetence " & dependant edema, prior left breast biopsy that was benign & still had  a lump in 2017, Prior history of left breast fibroadenoma 5 o'clock position 3 cm from nipple s/p an ultrasound-guided core biopsy which did not show any cancer or abnormal cells and no further follow-up needed for that, with hx of irregular menstrual cycle no longer on nuvaring, AUB on Micronor with mild improvement, u/S by gn outside [place] showed a endometrial polyp, low ferritin, palpitations, hx of JAYE, MDD, hx of SI, ADHD, seen by psyche in the past given ritalin did not tolerate, hx of a lot of cancellations & no shows, left patella subluxation,   Previously under care of PCP Wiley, Seen first time by this writer on 18 for vaginal odor & treated for BV with Metrogel. Std screen was negative. Had left knee pain, exam was benign & advised quad strengthening , weight loss & referred to ENT for enlarged tonsils. Cbc, CMP, lipids, TSH, hep B,C, HIV , syphilis & GC were negative. Seen 19 by Beto for sore throat , no infection seen. GC oral was negative. Was given nuvaring,  Advair dose increased but not started, remains stable on prior lower dosing.    Seen 3/8/19 for palpitations, aware of it since the middle of 2018. Symptoms started the day her brother in law  of a heart attack  & thought maybe was anxiety initially then worried about dying from a heart attack due to obesity. When first started palpitations it would last a couple days and then over time few hours, but when seen reported it didn't' occur as frequently. Would usually note when she was lying down at night. Did not feel while at work or while walking to the bus unless had to run. Usually when occurred at night would say a prayer and go to bed and get up next am feeling fine. With the palpitations she had no associated symptoms. . She also noted her Mom was concerned about her having bipolar disorder. She & her friends did not think she had it but her  Mom was concerned about bipolar and adult ADHD as mom had it. Noted periods of increased sexuality but no periods of getting by with no to less sleep & denied any visual or auditory hallucinations or psychosis.  Asthma was stable on Advair 100/50 & and albuterol prn. Noted did use her dads Symbicort inhaler when didn't have insurance & only used Albuterol neb if running out of meds. Exam was benign. Had no red flag symptom or signs. EKG showed NSR. Suspected anxiety and weight playing a role. Asthma was well controlled & continued on lower dosing of Advair.To work on lifestyle. Referred to psychology for diagnostic testing, counseling and then psyche if needed meds regarding concern about family hx of bipolar & ADHD. Referred to cardiology, & Holter ordered & referred to bariatrics. To consider seeing sleep in future given obesity & possible hx of snoring. Was to see gyn for irregular menstrual cycle and to do pap with them.   CMP, TSH, iron, HCG, HB A1c, CBC, was normal. Ferritin was lower end of normal and Holter was normal.  Seen by gyn 3/2019 for irregular menses, recurrent BV, Cx was normal, pap not done & progesterone was normal. No showed to follow up & cancelled apt 4/4/, 4/11, 4/17 & 4/18. Seen by cardiology 4/17 & reassured heart was normal & to work on loosing weight. Seen in the ER 8/10 after drinking alcohol with Suicidal ideation but evaluated and discharged home. Seen then by Vira gyn 8/30 for AUB & given progesterone only pill & pelvic u/S ordered , thought [facility] and Zeeshan signed to get report. Pelvic u/S 9/19 showed a 1.2 cm upper uterine segment polyp or submucosal fibroid & a 3.3cm simple right ovarian cyst. Seen by Gyn and PAP and GC obtained was normal. Seen by gyn 10/3/19 & planned for hysteroscopy polypectomy for AUB & u/S findings. Seen by Vira MORENO on 10/25/19 for vaginal odor but wet prep was normal. Cancelled and rescheduled from 11/29/19.  Seen 12/6/19 for  atypical chest pain, right leg fatigue and shin bruise and left kneecap popping out.  Examination was benign. Chest pain did not sound cardiac. Extensive work up this past year with EKG, Holter and cardiology ruled out cardiac cause. Given weight and use of  birth control though  did check basic tests. Suspected had costochondritis related to weight and sitting slouched and stress. Advised that physical therapy and weight loss could help. Right leg fatigue did not sound like a DVT and left knee symptoms were suggestive of patella femoral syndrome/ subluxation. Asthma was reported well controlled but wheezing was heard and reported had not taken her meds that am. Gave herself an inhaler while in the room. Asthma action plan given. Sylvie/ MDD noted  stable, & declined counseling or meds at the time.  Was advised to avoid alcohol.   To consider seeing bariatrics and sleep to evaluate and treat for  possible sleep apnea and help loose weight in the future. These referrals were given earlier.  CBC, CMP, ferritin, d-dimer and EKG were normal.  Doppler was negative and just showed the old bruise right anterior shin.  Did see sports medicine on 12/19 and diagnosed with subluxation of the left patella and referred to physical therapy which she says she has started doing.  No showed or canceled several times and finally rescheduled.     Seen 1/10/2020 for pre op for endometrial polyp removal.  Prior to procedure.  Did on care on 4/19 for viral symptoms.  TE done on 6/2020 desiring phentermine for weight loss & referred to bariatrics. 6/12 UA, GC HCG, TSH, cbc  in an outside clinic was normal. Seen by endo 6/19/20 & started on phentermine. Called in June as well about worsening leg edema, advised low salt, increase exercise & check with endo about meds, advised to come in for an apt to discuss more, to check for venous incompetence, felt diuretic not indicated.  Seen in UC 8/23/20 for chest tightness, nausea & vomiting. EKG was  normal & given Zofran. In UC at [facility] 9/4 for vaginal symptoms/ UTI symptoms since broke up desiring std testing. Gc, UA trich was negative. Negative for Hep C, B, syphilis & HIV. Apt 9/24 cancelled due to lack of transportation.  30 min late to 10/23 apt so rescheduled.     Seen 11/2/2020 for a physical & chronic concerns. Noted asthma was controlled on Advair bid and albuterol prn and meds & asthma action plan reviewed. Had refills. Remained on Micronor Birth control managed by gyn. Was to follow pelvic pain with gyn as needed. Discussed to consider sleep eval maybe after weight loss surgery. Was to follow up ortho as needed for knee pain. Discussed that Diet, exercise & weight loss would help. Ordered a Vein ultrasound to check for leaky veins given hx of edema. Encouraged to Elevate, avoid salt, consider compressions socks & would make further recommendations after this was reviewed. Reported chest pains sounded muscular, was monitor, & offered to do an u/s left breast if persisted. Was  Asymptomatic at the visit Wells criteria was low for PE. Was to stay active, monitor with her cycle. Was to consider a personal therapist, noted then was in family therapy.  Labs negative for Hep c, Hep B and immune, negative for syphilis, HIV. Low HDL, normal LDL, CMP, TSH, cbc  Had u/s showing varicose veins on 3/4/21 and advised compression, elevation, weight loss and referral to vascular if symptomatic despite that.      Seen by bariatrics in dec 2020. Seen by sleep 12/15/20 for snoring, apnea, obesity and sleep study ordered.  Seen by bariatrics regarding qualifying for bariatric surgery. Referred to sleep by them and had a HST on 1/2021 that was negative for sleep apnea. Seen by psychologist as part of bariatric surgery qualification process on 1/26/21, 2/4/21, 2/11/21, 2/23/21, 2/25/21. Seen by pulmonary on 2/11/21 and noted no CAILIN and cleared to go ahead with bariatric surgery. Seen by bariatrics 3/3, 4/1 & 5/6/21   No  showed to apt on 5/19/21 with PCP as constipation resolved. Cleared to have bariatric surgery by may but opted to defer for now.  Seen by a psychologist for eating issues on 5/25/21 and referred for diagnostic eval. Seen by Ronald Valerio on 6/7, 6/8 and diagnostic testing done 6/17 and noted on 7/5 had a dx of ADHD inattentive type and advised to see PCP or psychiatrist to consider meds in addition to CBT and mindfulness.      Seen by me virtually 7/26/21 for new dx of ADHD. Reviewed new diagnosis. 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 Counseled I do not manage ADHD meds. [employer] where she works is out of network so cannot refer to them but she was to check with her insurance and her work place. Referred to a community psychiatrist for recent new diagnosis of ADHD inattentive type for med management. Encouraged to consider CBT for ADHD with counselor and mindfulness for it too. Noted asthma was stable. Encouraged to use Advair twice a day daily so could exercise better. Was to follow up with bariatrics, sleep, gyn, ortho, as needed. Advised to use compression socks for varicose veins, felt should improve with weight loss. Noted had been approved for bariatric surgery but had decided to place on hold as desired to do it with diet and exercise, had seen a Counsellor for eating habits. Health care maintenance reviewed. Reminded to work on advance directives & was to return in nov for a physical.     Called 8/26 about intermittent chest tightness & sharp pinching left sided chest pain.advised to increase Advair to usual twice a day dosing & to be seen if having chest pain. Given albuterol neb on request but counseled would be no different than  of her albuterol inhaler.  Seen at [facility] on 8/27/21, felt had asthma exacerbation, Covid testing done, given a duneb & sent home on prednisone & referred to cardiology. Covid test came back negative. cxr normal.  Seen by cardiology at [facility] on 8/31/21 for  pinching sensation in chest every few days past 3 yrs. EKG showed NSR, borderline incomplete right bundle branch block. Corrected QT interval normal. Borderline voltage for left ventricular hypertrophy.  Cardiology diagnosed with atypical pinching sensation in the left chest that did not sound typical for cardiac. Advised to get a stress echo and follow up with results.. reported had Upper respiratory type symptoms following a COVID vaccination. Her recent asthma exacerbation, had improved. Weight was discussed were to review an exercise prescription once results of the stress echo resting function reviewed and excluded the very rare possibility of myocarditis. Stress echo not scheduled.   Telemedicine done with nutritionist at [facility] on 11/17/21 for [facility] weight management.  No showed to PCP apt 11/30/21.   [place]  shows received methylphenidate 5 mg #14 on 9/18, then 10 mg # 30 on 10/4/21 & 5 mg # 60 on 10/27/21 by Vince Mcguire     Seen 1/18/22 for preventive health and additional concerns. 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 Pelvic / PAP/ HPV due and reported to get with her gynecologist on 1/25/2022 at [facility]. Health care maintenance reviewed. To consider working on healthcare directives and honoring choices form given. Labs were done prior to appointment. & opted to wait to see Gyn to get blood type done. Declined flu shot & COVID booster. Given Pneumovax 23.   BMI more than 48, working with weight watchers and had lost 6 pounds,  Previously seen by bariatrics & was cleared for surgery in 2021 at [facility] but opted not to go that route. Given compression socks to wear during the day for history of venous incompetence and dependent edema. Hx of irregular menstrual cycle likely related to weight and progesterone only birth control. To discuss  evaluation of PCOS with gynecologist when saw them on the 25th. Hemoglobin A1c did not show diabetes or prediabetes. Further refills of progesterone only birth control to be done by gynecologist at the visit.  For asthma, well controlled at the time noted had only been using Advair once every other day. Encouraged Advair 1 puff twice a day preferably, or to bump up to recommended dosing when felt more short of breath. Asthma action plan in place.  Hx of intermittent pinching left sided chest wall atypical chest pain off and on in the past. Last occurred in August 2021 when seen by an urgent care at [facility] and referred to cardiologist who did an EKG which was normal. Symptoms did not sound cardiac but was recommended to get a stress test which did not get done. History of asymptomatic enlarged tonsils. History of snoring but reported had been checked with a sleep evaluation done at [facility] in 2021 & was negative for obstructive sleep apnea. To try and avoid sleeping on back as much as possible. Had a family history of diabetes. History of subluxation left patella. Recommended quadriceps isometric strengthening exercises ten sets twice a day. If no improvement or got worse recommended follow-up with orthopedics. History of depression and anxiety stable off medications. History of ADHD seen by a community psychiatrist and given Ritalin 5 mg. 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. 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 Was to follow-up with her community psychiatrist as needed and can refer to a counselor if needs more help. Was to return in 6 months for asthma and in 1 year for preventive physical.  Labs showed normal B12, lipids, TSH, CMP, vitamin D, ferritin, CBC and hemoglobin A1c.     Seen by gynecology 1/25/2022 at [facility] on no Pap was done advised ultrasound pelvis.  Seen by counselor 3/9/2022 for adjustment disorder  with MDD and ADHD.  Seen in ER 5/30/2022 for facial swelling possible allergy given EpiPen and prednisone.  Seen on 6/17/22 in [place] urgent care diagnosed with viral URI.  Seen later in [facility] urgent care and advised the same thing and supportive care given.  Lantus test done 7/2022 was negative.  Seen in ER 9/25/2022 for chest pain D-dimer EKG CRP and examination was unremarkable and reassured and discharged home.  Seen 10/10/2022 for asthma exacerbation treated with prednisone Singulair and DuoNeb.  Seen 6/2/2023 for asthma exacerbation and given Medrol and Zyrtec.  Noted had run out of insurance since beginning of the year.    Seen 8/5/23 for preventive physical and additional concerns. Breast exam normal to continue self check regularly. 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 Screening Mammograms starting age 40. Pap due to be getting with gynecology at [facility] on 23 August   No STD testing needed, noted monogamous relationship same partner. Healthcare maintenance reviewed. To consider working on healthcare directives. Vaccines reviewed. Declined Prevnar 20& opted not to get COVID as felt had an asthma exacerbation after the last booster. Labs done & to make further recommendations once those were reviewed. BMI more than 49 has tried weight watchers in the past.  Previously seen by bariatrics and cleared for surgery in 2021 at [facility] but opted not to go that route. Had gained some more weight, was interested in seeing weight specialist for injectable med evaluation. Referral placed. Family history of diabetes. Asthma no longer on Advair since lost insurance. Using Wixela.  Had not tried Singulair for 3 months due to cost & reported had not required Zyrtec for allergies in a while. Was given prednisone for an asthma flare up recently.  Counseled  that chronic steroid use could cause weight gain diabetes and prescribed Symbicort 160/4.5,  2 puffs twice a day and additional 1 to 2 puffs as needed for flare up no more than 12 total puffs a day.  Asthma action plan in place  History of allergic reaction, in 2022 seen in ER for facial swelling given an EpiPen to use but never picked up, suspected due to soy, never tested, no recurrence, had been avoiding soy products.  Reported had access to boyfriend's EpiPen.  Advised if used then needed to go to the ER.  Referral to an allergist given. History of snoring, reported prior sleep study done as part of bariatric surgery work-up in [facility] in 2021 had been unremarkable. Continued to snore. Had had some weight gain. Recommended sleeping on side, referred to sleep specialist and hopefully would improve with weight loss. Had had enlarged tonsils in the past not obstructing airway but could not rule out that it was not contributing to snoring. History of venous incompetence and dependent edema.  Did not do the venous incompetence ultrasound in the past.  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 Prescription given to take to any home medical company.  Would be best if they could measure before giving her the correct size.  Knee-high 15 to 20 mmHg prescribed.  Weight loss would likely help.  If after that continued to be a concern then may be could do the ultrasound as previously discussed.  Irregular menstrual cycles improved since went off the birth control.  Reported ultrasound done in [facility] January 2022 showed fibroids.  She was to discuss more with Gyn when saw them in August.   Depression in remission. Generalized anxiety remained. Declined need for counseling or medications.  Regular physical activity was helpful.  To try magnesium over-the-counter to help with anxiety. History of ADHD diagnosed in 2021 was seeing a psychiatrist and given Ritalin to try but did  not like the anxiety related to the med and worried about the side effects. Felt was doing well without it.  History of intermittent pinching left-sided chest wall pain felt unlikely to be cardiac. Seen in the past in urgent care and ER as well as by cardiology for this. Reported had not had in a while. Left patellar subluxation no longer an issue. To follow-up in 3 months to recheck asthma and sooner as needed.    Later called back Symbicort was not covered and changed back to generic Advair Wixela whichever 1 is covered by insurance.  Seen by [facility] gynecology 8/29/2023 Pap done was normal ultrasound ordered to evaluate fibroids not scheduled due to insurance issues.  Seen by the weight clinic 2/26/2020 for the dietitian 2/27.  Started on zepbound.  Developed itching at site of injection and then insurance also did not cover med anymore.  Seen in urgent care for nausea vomiting April.  In May zeppound was changed to Wegovy but has not started that yet is not covered by insurance.  On 6/27 noted immune to chickenpox and negative for QuantiFERON in anticipation of a new job.  [place]  negative    CURRENTLY   Here in a virtual apt for asthma.  Last seen in August and advised follow-up in 3 months but due to insurance changes not seen till now.  Notes that he recently got a new job in pediatric human supplies and staying with dad and step mom in [place] and does not have a car hence this was a virtual visit.    Asthma no longer on Advair since lost insurance. Was using Wixela.  Did not try Singulair due to cost has not been using her antihistamine in a while, in 8/2023 when seen given new asthma guidelines was prescribe Symbicort 160/4.5,  2 puffs twice a day and additional 1 to 2 puffs as needed for flare up no more than 12 total puffs a day.  But reported this was not covered and was put back on Wixela./Advair.  ACT equals 21.  Reports still occasional shortness of breath felt better on Advair discus.   Understands the medication she has been given is the same ingredients as Advair Diskus.  Unclear what insurance will cover.  Went over given new guidelines and agreeable to trying airsupra instead for both controller & rescue.  Has not seen a pulmonologist in a while and agreeable to a referral.  Reminded about getting Prevnar 20.  Is hesitant to get the COVID-vaccine as felt it messed up her asthma after the second shot.  Currently reports struggling with allergies past 1 month.  Advised to take Claritin daily and discussed using Astelin nasal spray over-the-counter to help with symptoms.     History of allergic reaction, in 2022 seen in ER for facial swelling given an EpiPen to use but never picked up, suspected due to soy, never tested, no recurrence, has been avoiding soy products.  Previously reported had access to boyfriend's EpiPen but currently has broken up with prior boyfriend.  Offered to prescribe an EpiPen but declined agreeable to an allergy referral.     History of snoring, reported prior sleep study done as part of bariatric surgery work-up in [facility] in 2021 had been unremarkable. Living with dad and step mom currently and unknown if she snores.     Has had enlarged tonsils in the past that she feels is unchanged & not bothersome    BMI more than 49/ fh of dm  has tried weight watchers in the past.  Previously seen by bariatrics and cleared for surgery in 2021 at [facility] but opted then not to go that route. Had gained some more weight  & referred to the weight specialist in 2023 to explore meds. for injectable med evaluation.  Seen by the weight clinic 2/26/2020 for the dietitian 2/27.  Started on zepbound.  Developed itching at site of injection and then insurance also did not cover med anymore.  Seen in urgent care for nausea vomiting April.  In May zeppound was changed to Wegovy but has not started that yet is not covered by insurance. New insurance not covering weight loss meds and currently not  on any injectable.  While Wegovy med is on file she has had difficulty getting it due to insurance and difficulty getting back in with the weight clinic.    Diff getting in with wt. will plan to do labs when returns for a physical.    History of venous incompetence and dependent edema.  Did not do the venous incompetence ultrasound ordered in the past and recommended to try compression socks  & was given a prescription for knee-high 15 to 20 mmHg socks to get a home medical store after being measured by them.  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  Reports no swelling.  Has not tried the compression socks yet and encouraged to give it a try can do further evaluation when seen.  If no help after that    Noted prior history of left subluxation patella no longer an issue    Hx of irregular menstrual cycles improved when went off the birth control in the past. Reported ultrasound done in [facility] January 2022 showed fibroids.  She did see gynecology at [facility] on 8/29 and a Pap done was normal and an ultrasound was ordered for fibroids but due to insurance issues will be now getting it in October.    Noted she has been prescribed a combined estrogen containing birth control by her gynecologist.  Reports it is for a trip in September when she is going to [facility] with friends for vacation and plans do not have her menstrual cycle at that time.  Counseled with BMI and long flight at risk of DVT and may have lower respiratory progesterone only medication and encouraged she contact her gynecologist to see if this option would be possible.  Also discussed may be best to see the travel clinic for international vacation starting September 19      Depression in remission. Feels still dealing with heart break is safe.  Generalized anxiety remains.  Currently declines need for counseling or medications.   "  History of ADHD diagnosed in 2021 was seeing a psychiatrist and given Ritalin to try but did not like the anxiety related to the med and worried about the side effects & feels has been doing well without it.  History of intermittent pinching left-sided chest wall pain evaluated by cardiology and many times in urgent care and ER in the past assessed to be noncardiac has not occurred in a long time feels due to weight loss and being more active has not experienced it in a while.      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 Screening Mammograms starting age 40.    Review of Systems  Constitutional, HEENT, cardiovascular, pulmonary, GI, , musculoskeletal, neuro, skin, endocrine and psych systems are negative, except as otherwise noted.      Objective    Vitals - Patient Reported  Weight (Patient Reported): 127.1 kg (280 lb 3.2 oz)  Height (Patient Reported): 163.8 cm (5' 4.5\")  BMI (Based on Pt Reported Ht/Wt): 47.35  Pain Score: No Pain (0)    Physical Exam   GENERAL: alert and no distress  EYES: Eyes grossly normal to inspection.  No discharge or erythema, or obvious scleral/conjunctival abnormalities.  RESP: No audible wheeze, cough, or visible cyanosis.    SKIN: Visible skin clear. No significant rash, abnormal pigmentation or lesions.  NEURO: Cranial nerves grossly intact.  Mentation and speech appropriate for age.  PSYCH: Appropriate affect, tone, and pace of words    No results found for any visits on 07/25/24.  No results found for this or any previous visit (from the past 24 hour(s)).      Video-Visit Details    Type of service:  Video Visit   Originating Location (pt. Location): Home    Distant Location (provider location):  On-site  Platform used for Video Visit: Jean Paul  Signed Electronically by: Angela Pelayo MD  "

## 2024-07-25 NOTE — TELEPHONE ENCOUNTER
General Call    Contacts       Contact Date/Time Type Contact Phone/Fax    07/25/2024 07:10 AM CDT Phone (Incoming) Yael Johnson (Self) [phone] (M)          Reason for Call:  check in to virtual appt    What are your questions or concerns:  check in to virtual appt. Patient hung up phone when writer was waiting for a response from [facility]    Could we send this information to you in [facility] (roll.to)[facility] or would you prefer to receive a phone call?:   Patient would prefer a phone call   Okay to leave a detailed message?: Yes at Cell number on file:    Telephone Information:   Mobile [phone]

## 2024-07-25 NOTE — LETTER
My Asthma Action Plan    Name: Yael Johnson   YOB: 1990  Date: 7/25/2024   My doctor: Angela Pelayo MD   My clinic: [facility]        My Control Medicine: & My Rescue Medicine: same  airsupra 1 puff twice a day and upto 12/ day as needed    My Asthma Severity:   Moderate Persistent  Know your asthma triggers: upper respiratory infections and exercise or sports  smoke  upper respiratory infections            GREEN ZONE   Good Control  I feel good  No cough or wheeze  Can work, sleep and play without asthma symptoms       Take your asthma control medicine every day.     If exercise triggers your asthma, take your rescue medication  15 minutes before exercise or sports, and  During exercise if you have asthma symptoms  Spacer to use with inhaler: If you have a spacer, make sure to use it with your inhaler             YELLOW ZONE Getting Worse  I have ANY of these:  I do not feel good  Cough or wheeze  Chest feels tight  Wake up at night   Keep taking your Green Zone medications  Start taking your rescue medicine:  every 20 minutes for up to 1 hour. Then every 4 hours for 24-48 hours.  If you stay in the Yellow Zone for more than 12-24 hours, contact your doctor.  If you do not return to the Green Zone in 12-24 hours or you get worse, start taking your oral steroid medicine if prescribed by your provider.           RED ZONE Medical Alert - Get Help  I have ANY of these:  I feel awful  Medicine is not helping  Breathing getting harder  Trouble walking or talking  Nose opens wide to breathe       Take your rescue medicine NOW  If your provider has prescribed an oral steroid medicine, start taking it NOW  Call your doctor NOW  If you are still in the Red Zone after 20 minutes and you have not reached your doctor:  Take your rescue medicine again and  Call 911 or go to the emergency room right away    See your regular doctor within 2 weeks of an Emergency Room or Urgent Care  visit for follow-up treatment.          Annual Reminders:  Meet with Asthma Educator,  Flu Shot in the Fall, consider Pneumonia Vaccination for patients with asthma (aged 19 and older).    Pharmacy:    [facility] [place], [place] - [facility] [address]  [id number] - [place] [id number] [place], [place] [address]  [facility] - [place], [place] - [address]  [id number] - [facility] [address]    Electronically signed by Angela Pelayo MD   Date: 07/25/24                      Asthma Triggers  How To Control Things That Make Your Asthma Worse    Triggers are things that make your asthma worse.  Look at the list below to help you find your triggers and what you can do about them.  You can help prevent asthma flare-ups by staying away from your triggers.      Trigger                                                          What you can do   Cigarette Smoke  Tobacco smoke can make asthma worse. Do not allow smoking in your home, car or around you.  Be sure no one smokes at a child s day care or school.  If you smoke, ask your health care provider for ways to help you quit.  Ask family members to quit too.  Ask your health care provider for a referral to Quit Plan to help you quit smoking, or call 5-234-805-PLAN.     Colds, Flu, Bronchitis  These are common triggers of asthma. Wash your hands often.  Don t touch your eyes, nose or mouth.  Get a flu shot every year.     Dust Mites  These are tiny bugs that live in cloth or carpet. They are too small to see. Wash sheets and blankets in hot water every week.   Encase pillows and mattress in dust mite proof covers.  Avoid having carpet if you can. If you have carpet, vacuum weekly.   Use a dust mask and HEPA vacuum.   Pollen and Outdoor Mold  Some people are allergic to trees, grass, or weed pollen, or molds. Try to  keep your windows closed.  Limit time out doors when pollen count is high.   Ask you health care provider about taking medicine during allergy season.     Animal Dander  Some people are allergic to skin flakes, urine or saliva from pets with fur or feathers. Keep pets with fur or feathers out of your home.    If you can t keep the pet outdoors, then keep the pet out of your bedroom.  Keep the bedroom door closed.  Keep pets off cloth furniture and away from stuffed toys.     Mice, Rats, and Cockroaches   Some people are allergic to the waste from these pests.   Cover food and garbage.  Clean up spills and food crumbs.  Store grease in the refrigerator.   Keep food out of the bedroom.   Indoor Mold  This can be a trigger if your home has high moisture. Fix leaking faucets, pipes, or other sources of water.   Clean moldy surfaces.  Dehumidify basement if it is damp and smelly.   Smoke, Strong Odors, and Sprays  These can reduce air quality. Stay away from strong odors and sprays, such as perfume, powder, hair spray, paints, smoke incense, paint, cleaning products, candles and new carpet.   Exercise or Sports  Some people with asthma have this trigger. Be active!  Ask your doctor about taking medicine before sports or exercise to prevent symptoms.    Warm up for 5-10 minutes before and after sports or exercise.     Other Triggers of Asthma  Cold air:  Cover your nose and mouth with a scarf.  Sometimes laughing or crying can be a trigger.  Some medicines and food can trigger asthma.

## 2024-07-25 NOTE — PATIENT INSTRUCTIONS
Try airsupra for both controlling and rescue symptoms management for asthma  See pulmonary for asthma optimization  Asthma action plan in place  See allergist for hx of facial swelling  Recommend prevnar   Consider COVID vaccine  Consider sleep eval for snoring, currently unknown, wt loss and sleeping on side can minimize it  Enlarged tonsils are not bothersome  Continue care with the wt doctor, off zepbound and wegovy as not covered and had side effects from prior zepbound  Continue to eat a healthy diet and exercise regularly  Make a physical apt to get labs done given hx of diabetes  Try compression socks for hx of venous incompetence and leg aching/ dependant edema  May get otc 15 to 20 mm knee high, would be good to wear on long travel  Discuss  with your gyn safety of combined estrogen containing birth control. Given BMI and long flight a progesterone only pill to delay period for trip might be safer than combined pill  Stay well hydrated and exercise regularly on flight  Recommend seeing the travel clinic for upcoming sept trip to [facility]  Depression in remission  Anxiety unchanged  Hx of ADHD in past   Currently no meds desired  Referred for counselling  No longer with proir intermittent left sided pinching chest pain since lost some wt   Knee doing better  No new breast concerns  Pap done with gyn to get u/s for fibroids in oct  Return for a physical     The above note was dictated using voice recognition. Although reviewed after completion, some word and grammatical error may remain .

## 2024-07-26 ASSESSMENT — ASTHMA QUESTIONNAIRES: ACT_TOTALSCORE: 21

## 2024-08-02 NOTE — PROGRESS NOTES
"Yael is a 33 year old who is being evaluated via a billable video visit.      The patient has been notified of following:     \"This video visit will be conducted via a call between you and your physician/provider. We have found that certain health care needs can be provided without the need for an in-person physical exam.  This service lets us provide the care you need with a video conversation.  If a prescription is necessary we can send it directly to your pharmacy.  If lab work is needed we can place an order for that and you can then stop by our lab to have the test done at a later time.    Video visits are billed at different rates depending on your insurance coverage.  Please reach out to your insurance provider with any questions.    If during the course of the call the physician/provider feels a video visit is not appropriate, you will not be charged for this service.\"    Patient has given verbal consent for Video visit? Yes    How would you like to obtain your AVS? MyChart    If the video visit is dropped, the invitation should be resent by: My Chart    Will anyone else be joining your video visit? No    I    Video-Visit Details    Type of service:  Video Visit    Originating Location (pt. Location): Work in [place]     Distant Location (provider location):   [facility]    Platform used for Video Visit: [employer]    Video Start Time: 9:51 AM    Video End Time:10:08 AM    2024    Text to join 9:49 am,     Return Medical Weight Management Note     Yael Johnson  MRN:  [id number]  :  1990    Assessment & Plan   Problem List Items Addressed This Visit       Family history of diabetes mellitus    Relevant Orders    Hemoglobin A1c    Obesity, Class III, BMI 40-49.9 (morbid obesity) (H) - Primary     Stopped Zepbound d/t being w/o insurance for a couple weeks. Unsure if new insurance covers weight loss medications. Did not some mild itching at injection site for " Zepbound so will try for Wegovy. Discussed [facility] if not covered or revisiting and looking at Bariatric surgery.         Relevant Medications    Semaglutide-Weight Management (WEGOVY) 0.25 MG/0.5ML pen    Semaglutide-Weight Management (WEGOVY) 0.5 MG/0.5ML pen    Semaglutide-Weight Management (WEGOVY) 1 MG/0.5ML pen    Other Relevant Orders    Hemoglobin A1c        AOM considerations:  Phentermine:   Anxiety: Yes - situational Heart palpitations - reported checked out and normal, used previously, not overly helpful - more weight loss with keto diet  Topiramate: Current birth control:      None - uses spermacidal spray            GLP-1: wegovy/zepbound appear covered, unclear saxenda coverage                     Naltrexone:        Wellbutrin: Depression:Yes - some in the past, situational anxiety     Metformin:         Contrave:  Qsymia: Current birth control: None - uses spermacidal spray     PATIENT INSTRUCTIONS:  Pt Instructions:  Labs ordered.  Please call [phone] to set up a lab appt.   If approved:  Start Wegovy (semaglutide)   0.25 mg once weekly for 4 weeks,   if tolerating increase to 0.5 mg weekly for 4 weeks,   if tolerating increase to 1 mg weekly    Please check in at the 1 mg dose - either at an apt or on MyChart to see how you're doing for the 1.7 mg dose.     May use famotidine 20 mg twice daily as needed for nausea/heartburn when starting the medication.     3. If these meds are an exclusion: could read/consider [facility] handout below.   4. Related to bariatric surgery: Please view our Weight Loss Surgery Seminar at the following website:   [email or web address]       Goals:  Focus on healthy food choices - prioritizing protein and veggies in your meals. I recommend eating every 4-6 hours to reduce the risk of low blood sugars and try to minimize snacking between meals. Stay well hydrated though the day as  "well.  Recommend pursing regular exercise. 5 minutes of exercise every other day or every 2 days is a great place to start with aiming for 30 minutes 5 times a week as an end goal.  If you can, try to get some strength training twice weekly while losing weight to help preserve your muscle!      Follow up:    Call [phone] to schedule next visit in next available. Be in touch on Mychart for refills/concerns between visits. If interested in looking at Bariatric surgery with us - schedule the follow-up visit as a \"Bariatric surgery consult\" for 60 minutes.    21 minutes spent on the date of the encounter doing chart review, history and exam, result review, counseling, developing plan of care, documentation, and further activities as noted      INTERVAL HISTORY:  Yael returns for medical weight management follow up.  Last seen on 5/22/24 by Latrice Cottrell PA-C plan at that time to pivot from tirzepatide to Wegovy due to supply issues.    No medications currently - had only 1 week of Zepbound left in May and then got a new job and lost insurance for a couple week. With new job, different insurance - new insurance doesn't cover weight loss medications.    Had some weight loss with Zepbound - about 15 lbs weight response. Did note some itchiness around injection site a couple days after injection. Didn't notice at first box.     Discussed [facility] if still no insurance covered.    WEIGHT METRICS:  Body mass index is 46.05 kg/m .   Current Weight: 281 lb (127.5 kg) (patient reported)  Last Visits Weight: 284 lb (128.8 kg)  Initial Weight (lbs): 294 lbs  Cumulative weight loss (lbs): 13  Weight Loss Percentage: 4.42%    Wt Readings from Last 10 Encounters:   08/06/24 281 lb (127.5 kg)   05/22/24 284 lb (128.8 kg)   04/22/24 290 lb (131.5 kg)   02/20/24 294 lb (133.4 kg)   09/15/23 300 lb (136.1 kg)   08/08/23 299 lb (135.6 kg)   06/02/23 299 lb 3.2 oz (135.7 kg)   05/30/22 279 lb (126.6 kg)   01/18/22 291 lb (132 " "kg)   11/12/20 267 lb 3.2 oz (121.2 kg)                 5/21/2024    12:41 PM   Weight Loss Medication History Reviewed With Patient   Which weight loss medications are you currently taking on a regular basis? None   If you are not taking a weight loss medication that was prescribed to you, please indicate why: Other   Are you having any side effects from the weight loss medication that we have prescribed you? No           5/21/2024    12:41 PM   Changes and Difficulties   I have made the following changes to my diet since my last visit: I limited eating out fast food to maybe 3 times a week instead of every other day. I cook more. And i eat more protein & veggies.   With regards to my diet, I am still struggling with: Drinking water, meal prepping, eating every four hours.   I have made the following changes to my activity/exercise since my last visit: I havent been exercising but i plan to this week when i can get a gym membership.   With regards to my activity/exercise, I am still struggling with: Actually exercising       LABS:  Reviewed in Epic    4/22/24 CMP normal    BP Readings from Last 6 Encounters:   04/22/24 119/72   09/15/23 129/74   08/08/23 118/74   06/02/23 126/81   05/30/22 136/82   01/18/22 126/78       Pulse Readings from Last 6 Encounters:   04/22/24 90   09/15/23 90   08/08/23 81   06/02/23 95   05/30/22 76   01/18/22 81       PE:  Ht 5' 5.5\" (1.664 m)   Wt 281 lb (127.5 kg)   LMP 07/23/2024 (Exact Date)   BMI 46.05 kg/m    GENERAL: Healthy, alert and no distress  EYES: Eyes grossly normal to inspection.    RESP: No audible wheeze, cough, or visible cyanosis.  No increased work of breathing.    SKIN: Visible skin clear. No significant rash, abnormal pigmentation or lesions.  NEURO: Mentation and speech appropriate for age.  PSYCH: Mentation appears normal, affect normal/bright, judgement and insight intact, normal speech and appearance well-groomed.      Sincerely,      Sulema Esquivel PA-C "

## 2024-08-05 ENCOUNTER — MYC MEDICAL ADVICE (OUTPATIENT)
Dept: FAMILY MEDICINE | Facility: CLINIC | Age: 34
End: 2024-08-05
Payer: COMMERCIAL

## 2024-08-06 ENCOUNTER — VIRTUAL VISIT (OUTPATIENT)
Dept: SURGERY | Facility: CLINIC | Age: 34
End: 2024-08-06
Payer: COMMERCIAL

## 2024-08-06 ENCOUNTER — TELEPHONE (OUTPATIENT)
Dept: FAMILY MEDICINE | Facility: CLINIC | Age: 34
End: 2024-08-06

## 2024-08-06 VITALS — HEIGHT: 66 IN | BODY MASS INDEX: 45.16 KG/M2 | WEIGHT: 281 LBS

## 2024-08-06 DIAGNOSIS — E66.01 OBESITY, CLASS III, BMI 40-49.9 (MORBID OBESITY) (H): Primary | ICD-10-CM

## 2024-08-06 DIAGNOSIS — Z83.3 FAMILY HISTORY OF DIABETES MELLITUS: ICD-10-CM

## 2024-08-06 DIAGNOSIS — J45.40 MODERATE PERSISTENT ASTHMA WITHOUT COMPLICATION: Primary | ICD-10-CM

## 2024-08-06 PROBLEM — E66.813 OBESITY, CLASS III, BMI 40-49.9 (MORBID OBESITY) (H): Status: ACTIVE | Noted: 2018-05-31

## 2024-08-06 PROCEDURE — 99213 OFFICE O/P EST LOW 20 MIN: CPT | Mod: 95 | Performed by: PHYSICIAN ASSISTANT

## 2024-08-06 RX ORDER — SEMAGLUTIDE 1 MG/.5ML
1 INJECTION, SOLUTION SUBCUTANEOUS WEEKLY
Qty: 2 ML | Refills: 1 | Status: SHIPPED | OUTPATIENT
Start: 2024-08-06 | End: 2024-08-07

## 2024-08-06 RX ORDER — SEMAGLUTIDE 0.25 MG/.5ML
0.25 INJECTION, SOLUTION SUBCUTANEOUS WEEKLY
Qty: 2 ML | Refills: 0 | Status: SHIPPED | OUTPATIENT
Start: 2024-08-06 | End: 2024-08-07

## 2024-08-06 RX ORDER — SEMAGLUTIDE 0.5 MG/.5ML
0.5 INJECTION, SOLUTION SUBCUTANEOUS WEEKLY
Qty: 2 ML | Refills: 1 | Status: SHIPPED | OUTPATIENT
Start: 2024-08-06 | End: 2024-08-07

## 2024-08-06 NOTE — PATIENT INSTRUCTIONS
"Nice to talk with you today. Below is the plan discussed.-  Sulema Esquivel PA-C     Pt Instructions:  Labs ordered.  Please call [phone] to set up a lab appt.   If approved:  Start Wegovy (semaglutide)   0.25 mg once weekly for 4 weeks,   if tolerating increase to 0.5 mg weekly for 4 weeks,   if tolerating increase to 1 mg weekly    Please check in at the 1 mg dose - either at an apt or on MyChart to see how you're doing for the 1.7 mg dose.     May use famotidine 20 mg twice daily as needed for nausea/heartburn when starting the medication.     3. If these meds are an exclusion: could read/consider [employer] handout below.   4. Related to bariatric surgery: Please view our Weight Loss Surgery Seminar at the following website:   [email or web address]       Goals:  Focus on healthy food choices - prioritizing protein and veggies in your meals. I recommend eating every 4-6 hours to reduce the risk of low blood sugars and try to minimize snacking between meals. Stay well hydrated though the day as well.  Recommend pursing regular exercise. 5 minutes of exercise every other day or every 2 days is a great place to start with aiming for 30 minutes 5 times a week as an end goal.  If you can, try to get some strength training twice weekly while losing weight to help preserve your muscle!      Follow up:    Call [phone] to schedule next visit in next available. Be in touch on [employer]t for refills/concerns between visits. If interested in looking at Bariatric surgery with us - schedule the follow-up visit as a \"Bariatric surgery consult\" for 60 minutes.      Compound [facility]lutide at [facility]  [facility] is now offering compounded semaglutide during the time of Wegovy national shortage/limited supply. Semaglutide is the generic name of Wegovy. [facility] is following the highest standards for sterility and " compounding practices. Not all compounding practices are equal. Therefore, [facility] will not be prescribing compounded semaglutide outside of the [facility]. Compounding of semaglutide is legal for as long as Wegovy is on the FDA's national shortage list. When/if Wegovy is taken off the FDA's shortage list, compounded semaglutide will no longer be legal to manufacture. When this occurs, patients will have to turn to acquiring Wegovy via its available manufactured pen, look into alternative weight loss medication(s), or stop the medication. Compound semaglutide will be available as a pre-filled syringe. Due to high demand of compounded semaglutide, orders may take 1-2 weeks to obtain from time of prescribing. Each dose of the medication will require a separate prescription.     As with any weight loss medication(s), there is a risk of weight regain should you stop semaglutide. It is important to be aware of this risk should you stop compounded semaglutide with no plans to transition back to an alternative injectable option as the use of semaglutide is intended for long term weight management with the intention of remaining on this injectable long term.        Obtaining Medication and Storage:   The pharmacy must speak to the patient directly prior to shipping medication to walk through administration, shipping and cost. Vials of compounded semaglutide and unit syringes will be mailed from the compounding pharmacy to your home in a refrigerated box. Keep your medication stored in the refrigerator. The vials are to be discarded 28 days after opening (even if there is remaining medication in the vial).     Common Side Effects:  Side effect profile is the same as Wegovy. Monitor for nausea, diarrhea, constipation, headache, indigestion, tiredness (fatigue), stomach upset or abdominal pain. Less commonly, semaglutide can cause low blood sugar (symptoms: shaky, dizzy, sweaty, agitation).  Please reach out to the care team should you feel like this is occurring. It is important to ensure that you are eating consistent meals and not skipping meals. Ensure you are getting at least 64 oz water daily. If any side effects become unmanageable, contact the care team immediately.    Dosing:  Each week you will have the opportunity/option to increase your dose. However, therapy is individualized for each patient. The below is a general guide should someone increase dosage of compound semaglutide each month.   Week 1-4: Inject 0.25 mg subcutaneously once weekly  Week 5-8: If tolerating, increase to 0.5 mg once weekly  Week 9-12: If tolerating, increase to 1 mg once weekly  Week 13-15: If tolerating, increase to 1.5 or 1.7 mg once weekly (dosing depending on what was prescribed by provider)  Week 16-19: If tolerating, increase to 2 mg once weekly  Week 20 & on: If tolerating, increase to 2.4 or 2.5 mg once weekly (dosing depending on what was prescribed by provider)  *If you are having some nausea or other side effects to where you are hesitant to move up to the next dose, stay at the same dose you are on for an additional 4 weeks to see if side effect(s) improves/resolves. Make sure to take this time to hydrate and utilizing smaller more consistent meals, such as 4-6 small meals per day.  It may be advantageous to stay at the same dose if you are seeing good efficacy (both on and off the scale) and having minimal/manageable side effects. If you do not have additional refills on that dose's prescription, please reach out to the clinic.    Mg to Unit Conversion Chart for Reference:         Administration:  Follow the instructions to fill the syringe with medicine. Instructions can be accessed at [email or web address] or by scanning this QR code-    Follow the instructions to inject your medication. Instructions can be accessed at [email or web address]  or by scanning this QR code-      Syringe Disposal:    Place the used syringe in a sharps container. You can buy a sharps container at your local pharmacy.   If you don't have a sharps container, you can use a plastic detergent container with a lid.   Visit Learning About Safe Needle Use and Disposal (MIOXwise.net) and safeneedledisposal.org for more information.     Cost:   $230 per month for doses 1 mg or less (one 1 mL vial), $370 per month for doses higher than 1 mg (two 1 mL vials)     [facility] Phone:  [phone]    Compound Tirzepatide at [facility]  [facility] is now offering compounded tirzepatide during the time of Zepbound/Mounjaro national shortage/limited supply. Tirzepatide is the generic name of Zepbound and Mounjaro. [facility] is following the highest standards for sterility and compounding practices. Not all compounding practices are equal. Therefore, [facility] will not be prescribing compounded tirzepatide outside of the [facility]. Compounding of tirzepatide is legal for as long as Zepbound or Mounjaro is on the FDA's national shortage list. When/if Zepbound and Mounjaro are taken off the FDA's shortage list, compounded tirzepatide will no longer be legal to manufacture. When this occurs, patients will have to turn to acquiring Mounjaro or Zepbound via its available manufactured pen, look into alternative weight loss medication(s), or stop the medication. Compound tirzepatide will be available as a pre-filled syringe. Due to high demand of compounded tirzepatide, orders may take 1-2 weeks to obtain from time of prescribing. Each dose of the medication will require a separate prescription.     If using Zepbound or compound tirzepatide for weight loss: Just like any weight loss medication(s), there is a risk of weight regain should you stop tirzepatide. It is important to be aware of this risk should you stop compounded tirzepatide with no plans  to transition back to an alternative injectable option as the use of tirzepatide is intended for long term weight management with the intention of remaining on this injectable long term.        Obtaining Medication and Storage:   The pharmacy must speak to the patient directly prior to shipping medication to walk through administration, shipping and cost. Pre-filled syringes of compound tirzepatide and needles will be mailed from the compounding pharmacy to your home in a refrigerated box. The pre-filled syringes should be stored in the refrigerator until time of injection. The medication is good for at least 30 days in refrigerator. In each prescription, you will get 4 prefilled syringes and 4 needles in each prescription.  You will need a new prescription for each strength of the medication.    Dosing:  It is a subcutaneous injection that you inject once weekly and titrate the dose slowly over time. Make sure inject the same time each day of the week, for example Monday evenings. Each syringe is single use.   Doses available for compoundin.5 mg, 5 mg, 7.5 mg, 10 mg, 12.5 mg and 15 mg  Week 1-4: Inject 2.5 mg once weekly  Week 5-8: If tolerating, can increase to 5 mg once weekly if necessary  Week 9-12: If tolerating, can increase to 7.5 mg once weekly if necessary  Week 13-16: If tolerating, can increase to 10 mg once weekly if necessary  Week 17-20: If tolerating, can increase to 12.5 mg once weekly if necessary  Week 21 and on: if tolerating, can increase to 15 mg once weekly if necessary    *Important: the goal is to get to a dose that is well tolerated and effective for you. You do not have to go up on the dose each month or get to maximum dose if getting an effective response with minimal side effects.     *If you are having some nausea or other side effects to where you are hesitant to move up to the next dose, stay at the same dose you are on for an additional week to see if side effect(s)  improves/resolves. Make sure to take this time to hydrate and ensure you are drinking at least 64 oz water per day. If you are wanting to stay at the same dose and do not have additional refills on that prescription please reach out to the clinic.    Common Side Effects:  Side effect profile is the same as Zepbound/Mounjaro because it is the same active ingredient. Monitor for nausea, diarrhea, constipation, headache, indigestion, tiredness (fatigue), stomach upset or abdominal pain. Less commonly, tirzepatide can cause low blood sugar (symptoms: shaky, dizzy, sweaty, agitation). Please reach out to the care team should you feel like this is occurring. It is important to ensure that you are eating consistent meals and not skipping meals. Ensure you are getting at least 64 oz water daily. If any side effects become unmanageable, contact the care team immediately.    Administration:  Wash your hands.   Obtain one needle and alcohol swab. Remove pre-filled syringe from refrigerator. Keep all other unused syringes in the refrigerator until time of use.   Inspect the syringe to ensure medication in syringe is clear/colorless and the clear shell cap on top of red syringe cap is intact.  Unlock the cap by pulling the clear outer shell straight off and remove the red syringe cap by twisting counter clockwise.  Attach needle to syringe by twisting needle onto syringe clockwise. Do not remove needle cap.   Choose injection site. Clean injection site with alcohol swab.    Appropriate areas of injection are: abdomen (at least 2 inches away from belly button) or front middle thigh.   Remove needle cap from syringe/needle.   Hold the syringe like a pencil. Insert the needle into skin at a 90-degree angle.  Using your pointer finger, push the syringe plunger down to inject the medicine.  Count to six. Then, remove the syringe from your skin.   Immediately place the syringe in a sharps container.   You can purchase a sharps  "container from your local pharmacy or [employer]. If you don't have a sharps container, you can use a plastic detergent container with a lid. The container should seal tightly, hold objects without leaking, breaking or cracking, and clearly be labelled \"sharps\".     Compounded tirzepatide monthly (4 pre-filled syringes) cost: $400-600 dependent on dose. Why more expensive than compound semaglutide? Compound semaglutide is created from more concentrated vial so requires less drug to compound semaglutide. Whereas, tirzepatide is more costly as it takes more of the drug to create the compounded tirzepatide product.     [facility] Phone:  [phone]        WEGOVY (semaglutide)      Wegovy (semaglutide) injection 2.4 mg is an injectable prescription medicine used for adults with obesity (BMI ?30) or overweight (excess weight) (BMI ?27) who also have weight-related medical problems to help them lose weight and keep the weight off.  It is a GLP-1 agonist medication. GLP1 agonists stimulate the hormone GLP-1 in your body to allow you to feel full quickly and stay full longer.    Due to the shortage, You may need to be persistent and patient to get these initial dosages due to the shortage.  Once you are able to obtain the 0.25 and 0.5 mg dose \"8 weeks of therapy\" you can begin treatment.     Directions:  Start Wegovy (semaglutide) 0.25 mg once weekly for 4 weeks, then if tolerating increase to 0.5 mg weekly for 4 weeks, then if tolerating increase to 1 mg weekly for 4 weeks, then if tolerating increase to 1.7 mg weekly for 4 weeks, then if tolerating increase to 2.4 mg weekly thereafter.      -Each Wegovy pen is a once weekly single-dose prefilled pen with a pen injector already built within the pen. Discard the Wegovy pen after use in sharps container.     Common Side Effects:    nausea, headache, diarrhea, stomach upset.  If these become unmanageable call or mychart.    Serious Side effects: "   Pancreatitis (inflammation of the pancreas) has been associated with this type of medication, but is very rare.Symptoms of pancreatitis include: Pain in your upper stomach area which may travel to your back and be worse after eating.     Storage:   Store the prescription in the refrigerator. Once it is time to use the Wegovy pen, you can keep the pen at room temperature and it is good for up to 28 days at room temperature.     How to inject:  For a video on how to use the pen please go to:  https://[email or web address]SPO/about-wegovy/how-to-use-the-wegovy-pen.html#itemTwo       For any questions or concerns please send a [employer] message to our team or call  [phone].  For emergencies please 911 or seek immediate medical care.     There is a small chance you may have some low blood sugar after taking the medication.   The signs of low blood sugar are:  Weakness  Shaky   Hungry  Sweating  Confusion      See below for ways to treat low blood sugar without adding in lots of extra calories.      Treating Low Blood Sugar    If you have symptoms of low blood sugar (sweating, shaking, dizzy, confused) eat 15 grams of carbs and wait 15 minutes:    Glucose Tabs are best for sugars under 70 -  Dex4 or BD Glucose tablets are good, you will need to take 3-4 of these to equal 15 grams.     One small box of raisins  4 oz fruit juice box or   cup fruit juice  1 small apple  1 small banana    cup canned fruit in water    English muffin or a slice of bread with jelly   1 low fat frozen waffle with sugar-free syrup    cup cottage cheese with   cup frozen or fresh blueberries  1 cup skim or low-fat milk    cup whole grain cereal  4-6 crackers such as Triscuits      This medication is usually not covered by insurance and can be quite expensive. Sometimes a prior authorization is required, which may take up to 1-2 weeks for an insurance company to make a decision if they will cover the medication. Please be patient, you will be notified  after a decision has been made.    Contact the nurse via dotSyntax or call [phone] if you have any questions or concerns. (Do not stop taking it if you don't think it's working. For some people it works without them knowing it.)     In order to get refills of this or any medication we prescribe you must be seen in the medical weight mgmt clinic every 2-4 months.    Using Your Wegovy Pen  Medicine (semaglutide)    Storing your pens  Store your pens in the refrigerator until you are ready to use them. Don't let them freeze.  Your pen may be stored at room temperature for 28 days or fewer. Just make sure the temperature doesn't get higher than 86 or lower than 46 degrees Fahrenheit.   Protect the pens from light.  Never use any pens that have .    Check your pen before use:  The liquid in the pen window should be clear and colorless. Bubbles are okay to see.   Do not use the pen if you can see the window contains any specks or it is cloudy or has changed color.  Make sure you have the medication and dose your health care provider prescribed.    Getting ready to inject:   1. Wash and dry your hands well.  2. Make sure the counter you use to place your supplies on is clean.  3. Make sure your injection time will not be interrupted by children or pets.  4. Have alcohol wipes or alcohol and cotton balls available to clean the injection site.   5. Choose your injection site. It can be on your stomach, back of upper arms, or upper legs. Remember to change your injection site each time you inject. Try to be at least 1 inch away from the previous one. Stay at least 1 inch away from your belly button.     Inject your dose:  1. Pull off the grey cap off the pen. Throw it in the trash. Do not put the cap back on the pen.   2. Clean the injection site with alcohol.   3. Push the grey part of the pen firmly against your skin. This will start the injection.  4. When the pen is injecting, you will hear 2 clicks. The first  click tells you the injection has started. The second one tells you the injection is continuing.   5. The injection is done when the yellow bar in the glass window at the bottom of the pen has stopped moving.   6. This injection is given 1 day a week. The pens come in  5 doses ranging from 0.25 mg to 2.4 mg. Each dose comes in a different color pen.  7. If you miss a dose, take is as soon as you remember. Do not take a missed dose, if it is within 2 days of the next dose.    8. Your injection may be best tolerated if given at night.     Disposing of your pens:  Dispose of your pens in a puncture-resistant container (hard plastic bottle) or Sharps container.  Check with the county you live in on how to dispose of the container.  Do not recycle the container with used pens.     Of note, you may not be able to  your medication right away. It may require a prior authorization from your insurance company. This may take a week or more.

## 2024-08-06 NOTE — TELEPHONE ENCOUNTER
New Medication Request        What medication are you requesting?: Advair Diskus inhaler     Reason for medication request: asthma, stll wheezy, coughing and sob.  Current rx is not working and would like to go back to advair    Have you taken this medication before?: Yes: Advair Diskus inhaler 100/50    Controlled Substance Agreement on file:   CSA -- Patient Level:    CSA: None found at the patient level.         Patient offered an appointment? No    Preferred Pharmacy:       [id number] [address] AT [facility] [address] [address]  Phone: [phone] Fax: [phone]      Could we send this information to you in [place] or would you prefer to receive a phone call?:   Patient would prefer a phone call   Okay to leave a detailed message?: Yes at Cell number on file:    Telephone Information:   Mobile [phone]

## 2024-08-06 NOTE — ASSESSMENT & PLAN NOTE
Stopped Zepbound d/t being w/o insurance for a couple weeks. Unsure if new insurance covers weight loss medications. Did not some mild itching at injection site for Zepbound so will try for Wegovy. Discussed [facility] if not covered or revisiting and looking at Bariatric surgery.

## 2024-08-07 RX ORDER — FLUTICASONE PROPIONATE AND SALMETEROL 100; 50 UG/1; UG/1
1 POWDER RESPIRATORY (INHALATION) EVERY 12 HOURS
Qty: 14 EACH | Refills: 1 | Status: SHIPPED | OUTPATIENT
Start: 2024-08-07

## 2024-08-07 RX ORDER — ACETAMINOPHEN AND CODEINE PHOSPHATE 120; 12 MG/5ML; MG/5ML
0.35 SOLUTION ORAL DAILY
COMMUNITY
Start: 2024-08-05

## 2024-08-07 NOTE — TELEPHONE ENCOUNTER
Patient is calling back regarding a missed call from clinic. She is asking for Advair to be send over to Pharmacy. Writer relayed Dr. Pelayo's message to patient. Patient states she plans to call Pulmonary sometime this week. Patient will call Rai to follow-up on the prescription.    No further action needed.    GUILLE LewN, RN   [facility]

## 2024-08-07 NOTE — TELEPHONE ENCOUNTER
It depends if advair is covered by her insurance.   I can put order in bt no guarantee  She really needs to see pulmonary

## 2024-08-07 NOTE — TELEPHONE ENCOUNTER
Writer called and left message on patient's voicemail to call back and speak with a triage nurse.    Kamla Pitt, BSN RN  [facility]

## 2024-08-07 NOTE — TELEPHONE ENCOUNTER
Requesting medication change, would you like a visit to discuss further? Please review and advise-thanks!

## 2024-08-12 ENCOUNTER — TELEPHONE (OUTPATIENT)
Dept: PULMONOLOGY | Facility: CLINIC | Age: 34
End: 2024-08-12
Payer: COMMERCIAL

## 2024-08-12 DIAGNOSIS — J45.909 ASTHMA: Primary | ICD-10-CM

## 2024-08-12 NOTE — TELEPHONE ENCOUNTER
[facility] Call Center    Phone Message    May a detailed message be left on voicemail: yes     Reason for Call: Appointment Intake    Referring Provider Name: Dr. Angela Pelayo  Diagnosis and/or Symptoms: Asthma    Pt is scheduled to establish care 10/29/24 with Dr. Jaime, needs orders placed for PFT's (pended).     Action Taken: Other: Pulm    Travel Screening: Not Applicable

## 2024-09-08 ENCOUNTER — NURSE TRIAGE (OUTPATIENT)
Dept: NURSING | Facility: CLINIC | Age: 34
End: 2024-09-08
Payer: COMMERCIAL

## 2024-09-08 NOTE — TELEPHONE ENCOUNTER
Nurse Triage SBAR    Is this a 2nd Level Triage? 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     Assessment: Getting red and puffy.  Denies getting bit by resident but resident has scratched others also.    Protocol Recommended Disposition:   No disposition on file.See provider within four hours.    Recommendation: Disposition to see provider within four hours.  Patient verbalizes understanding and will be seen at .    Kat Mukherjee RN  [facility]       Reason for Disposition   Suspicious history for the injury    Additional Information   Negative: [1] Major bleeding (e.g., actively dripping or spurting) AND [2] can't be stopped   Negative: Amputation   Negative: Shock suspected (e.g., cold/pale/clammy skin, too weak to stand, low BP, rapid pulse)   Negative: [1] Knife wound (or other possibly deep cut) AND [2] to chest, abdomen, back, neck, or head   Negative: Sounds like a life-threatening emergency to the triager   Negative: Human bite   Negative: [1] Bleeding AND [2] won't stop after 10 minutes of direct pressure (using correct technique)   Negative: Skin is split open or gaping (or length > 1/2 inch or 12 mm on the skin, 1/4 inch or 6 mm on the face)   Negative: [1] Deep cut AND [2] can see bone or tendons   Negative: Skin loss goes very deep (e.g., can see bones or tendons)   Negative: Skin loss involves more than 10% of body surface area (BSA)   Negative: [1] Dirt in the wound AND [2] not removed with 15 minutes of scrubbing   Negative: High pressure injection injury (e.g., from grease gun or paint gun, usually work-related)   Negative: Wound causes numbness (i.e., loss of sensation)   Negative: Wound causes weakness (i.e., decreased ability to move hand, finger, toe)   Negative: Sounds like a serious injury to the triager   Negative: [1] SEVERE pain AND [2] not improved 2 hours  after pain medicine/ice packs   Negative: [1] Looks infected AND [2] large red area (> 2 inches or 5 cm) or streak   Negative: [1] Looks infected (spreading redness, red streak, pus) AND [2] fever    Protocols used: Skin Injury-A-AH

## 2024-09-18 ENCOUNTER — MYC MEDICAL ADVICE (OUTPATIENT)
Dept: FAMILY MEDICINE | Facility: CLINIC | Age: 34
End: 2024-09-18
Payer: COMMERCIAL

## 2024-09-18 NOTE — TELEPHONE ENCOUNTER
Due to her BMi and risk of travel progesterone only (no estrogen)  was the correct med to try prevent a period  With norethindrone which is a safe good progesterone only birth control there is always risk as with all progesterone only birth control to have unpredictable spotting    She has chosen electively to not have a period for travel purposes  Its unfortunate occurred right before travel  Most times I think a higher dose of progesterone to achieve no menstrual bleeding    Megace is a progesterone only med at higher dosing than the norethindrone so it technically should work to not have a period  but I'm not familiar with its use for reasons such as hers. It is commonly given for hormonal purposes , cancer treatment related wt loss and is an appetite stimulant as well   Given lack of time to make any other suggestion & short use for  the med  I would trust what her ob told her and go with what they advised

## 2024-09-18 NOTE — TELEPHONE ENCOUNTER
Dr. Pelayo: pt wants your opinion on OB advice.    Lyle Pelayo,     I leave on vacation tomorrow      I started Norethindrone on 9/16 but still received my period on 9/17. My period was suppose to arrive 9/20.      This is what my OBGYN sent me and do you approve of me taking this?      Here is Adelaida Barton NP's feedback regarding your concerns:      Good morning,     Sorry to hear that your period came despite taking the birth control pill. Birth control pill can be taken continuously to prevent or avoid having period. Pills with higher estrogen content stabilizes the uterus and control bleeding much better. I send Megace, a prescription that we use to stop heavy bleeding. Please take the medication to stop your period. Take medication two times daily until your period stops. Please discontinue your birth control pill.     GUILLE MendozaN, PHN, RN-[facility]  [phone]

## 2024-09-18 NOTE — TELEPHONE ENCOUNTER
General Call      Reason for Call:  Would like the Sun-eee message from 9/18/24 to be seen and responded to ASAP for Dr. Pelayo.     Could we send this information to you in [employer] or would you prefer to receive a phone call?:   Patient would like to be contacted via [employer]

## 2024-09-19 NOTE — TELEPHONE ENCOUNTER
Writer responded via [employer].  GUILLE GilbertN, RN-BC  [facility]

## 2024-10-01 ENCOUNTER — DOCUMENTATION ONLY (OUTPATIENT)
Dept: OTHER | Facility: CLINIC | Age: 34
End: 2024-10-01
Payer: COMMERCIAL

## 2024-10-13 NOTE — TELEPHONE ENCOUNTER
See 8/6/24 AYESHA MATOS, BSN, PHN, RN  [facility]  [phone]     Oriented - self; Oriented - place; Oriented - time

## 2024-11-03 ENCOUNTER — HEALTH MAINTENANCE LETTER (OUTPATIENT)
Age: 34
End: 2024-11-03

## 2024-12-05 ENCOUNTER — IMMUNIZATION (OUTPATIENT)
Dept: PEDIATRICS | Facility: CLINIC | Age: 34
End: 2024-12-05
Payer: COMMERCIAL

## 2025-02-24 ENCOUNTER — MYC MEDICAL ADVICE (OUTPATIENT)
Dept: FAMILY MEDICINE | Facility: CLINIC | Age: 35
End: 2025-02-24

## 2025-02-24 ENCOUNTER — OFFICE VISIT (OUTPATIENT)
Dept: FAMILY MEDICINE | Facility: CLINIC | Age: 35
End: 2025-02-24
Payer: COMMERCIAL

## 2025-02-24 VITALS
HEART RATE: 101 BPM | SYSTOLIC BLOOD PRESSURE: 118 MMHG | TEMPERATURE: 97.5 F | BODY MASS INDEX: 45 KG/M2 | OXYGEN SATURATION: 97 % | WEIGHT: 280 LBS | DIASTOLIC BLOOD PRESSURE: 72 MMHG | RESPIRATION RATE: 21 BRPM | HEIGHT: 66 IN

## 2025-02-24 DIAGNOSIS — B34.9 VIRAL ILLNESS: Primary | ICD-10-CM

## 2025-02-24 LAB
FLUAV AG SPEC QL IA: NEGATIVE
FLUBV AG SPEC QL IA: NEGATIVE

## 2025-02-24 PROCEDURE — 99213 OFFICE O/P EST LOW 20 MIN: CPT | Performed by: FAMILY MEDICINE

## 2025-02-24 PROCEDURE — 87635 SARS-COV-2 COVID-19 AMP PRB: CPT | Performed by: FAMILY MEDICINE

## 2025-02-24 PROCEDURE — 87804 INFLUENZA ASSAY W/OPTIC: CPT | Performed by: FAMILY MEDICINE

## 2025-02-24 RX ORDER — CODEINE PHOSPHATE AND GUAIFENESIN 10; 100 MG/5ML; MG/5ML
1-2 SOLUTION ORAL EVERY 6 HOURS PRN
Qty: 118 ML | Refills: 0 | Status: SHIPPED | OUTPATIENT
Start: 2025-02-24

## 2025-02-24 ASSESSMENT — ASTHMA QUESTIONNAIRES
QUESTION_1 LAST FOUR WEEKS HOW MUCH OF THE TIME DID YOUR ASTHMA KEEP YOU FROM GETTING AS MUCH DONE AT WORK, SCHOOL OR AT HOME: NONE OF THE TIME
QUESTION_2 LAST FOUR WEEKS HOW OFTEN HAVE YOU HAD SHORTNESS OF BREATH: ONCE OR TWICE A WEEK
ACT_TOTALSCORE: 20
QUESTION_3 LAST FOUR WEEKS HOW OFTEN DID YOUR ASTHMA SYMPTOMS (WHEEZING, COUGHING, SHORTNESS OF BREATH, CHEST TIGHTNESS OR PAIN) WAKE YOU UP AT NIGHT OR EARLIER THAN USUAL IN THE MORNING: NOT AT ALL
QUESTION_4 LAST FOUR WEEKS HOW OFTEN HAVE YOU USED YOUR RESCUE INHALER OR NEBULIZER MEDICATION (SUCH AS ALBUTEROL): ONE OR TWO TIMES PER DAY
ACT_TOTALSCORE: 20
QUESTION_5 LAST FOUR WEEKS HOW WOULD YOU RATE YOUR ASTHMA CONTROL: WELL CONTROLLED

## 2025-02-24 ASSESSMENT — ENCOUNTER SYMPTOMS: COUGH: 1

## 2025-02-24 ASSESSMENT — PATIENT HEALTH QUESTIONNAIRE - PHQ9
SUM OF ALL RESPONSES TO PHQ QUESTIONS 1-9: 5
10. IF YOU CHECKED OFF ANY PROBLEMS, HOW DIFFICULT HAVE THESE PROBLEMS MADE IT FOR YOU TO DO YOUR WORK, TAKE CARE OF THINGS AT HOME, OR GET ALONG WITH OTHER PEOPLE: NOT DIFFICULT AT ALL
SUM OF ALL RESPONSES TO PHQ QUESTIONS 1-9: 5

## 2025-02-24 ASSESSMENT — PAIN SCALES - GENERAL: PAINLEVEL_OUTOF10: NO PAIN (0)

## 2025-02-24 NOTE — PROGRESS NOTES
"  Assessment & Plan     (B34.9) Viral illness  (primary encounter diagnosis)  Comment: awaiting labs - plans to stay home until she is improved, symptoms abating   Plan: COVID-19 Virus (Coronavirus) by PCR Nose,         Influenza A & B Antigen - Clinic Collect,         guaiFENesin-codeine (ROBITUSSIN AC) 100-10         MG/5ML solution                  BMI  Estimated body mass index is 45.89 kg/m  as calculated from the following:    Height as of this encounter: 1.664 m (5' 5.5\").    Weight as of this encounter: 127 kg (280 lb).             Helena Conley is a 34 year old, presenting for the following health issues:  Cough (Runy nose/Sore throat )        2/24/2025     8:01 AM   Additional Questions   Roomed by rosy tovar   Accompanied by blaise         2/24/2025     8:01 AM   Patient Reported Additional Medications   Patient reports taking the following new medications n/a     History of Present Illness       Reason for visit:  Covid testing  Symptom onset:  3-7 days ago  Symptoms include:  Couch, sore throat (going away), tired, and runny nose (went away)  Symptom intensity:  Mild  Symptom progression:  Improving  Had these symptoms before:  Yes  Has tried/received treatment for these symptoms:  No  What makes it worse:  Nit taking inhaler and not drinking water  What makes it better:  Rest, water, and meds   She is taking medications regularly.     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      Objective    BP " "118/72 (BP Location: Right arm, Patient Position: Sitting, Cuff Size: Adult Regular)   Pulse 101   Temp 97.5  F (36.4  C) (Temporal)   Resp 21   Ht 1.664 m (5' 5.5\")   Wt 127 kg (280 lb)   SpO2 97%   BMI 45.89 kg/m    Body mass index is 45.89 kg/m .  Physical Exam   GENERAL: alert and mild distress  NECK: no adenopathy, no asymmetry, masses, or scars  RESP: lungs clear to auscultation - no rales, rhonchi or wheezes intermittent cough no dyspnea  CV: regular rate and rhythm, normal S1 S2, no S3 or S4, no murmur, click or rub, no peripheral edema     Awaiting lab results        Signed Electronically by: Jesika Gutierrez MD    "

## 2025-02-24 NOTE — PATIENT INSTRUCTIONS
Stuffy nose:  Nasal saline OTC every 2-4 hours to clean out sinuses  Humidifier at bedside at night and steam face before bed  Vicks can help too  Avoid Afrin - only use for 3 nights if needed    Cough:  Moist steam before bed and in morning  Mucinex DM is great - loosens phlegm and quiets cough - take with lots of water  Try Tessalon perles for cough - if this is not helping I can send in another cough agent (robitussin with codeine)    Nasal/sinus pressure:  Pseudoephedrine/sudafed during the day 30 mg morning and noon   Flonase/Fluticasone nasal spray and nasal lavage with netti pot use buffered saline  Mucinex DM might also really help  Lots of steam and moist air to avoid dryness, humidifier in bedroom

## 2025-02-24 NOTE — LETTER
February 24, 2025      Yael Jonhson  [address]  [place] [address]        To Whom It May Concern:    Yael Johnson  was seen on 2/24/2025.  Please excuse her absence from Thursday 2/20 through this week.  She should be able to return when feeling better.  She has had a viral URI that is contagious. .        Sincerely,        Jesika Gutierrez MD    Electronically signed

## 2025-02-25 LAB — SARS-COV-2 RNA RESP QL NAA+PROBE: NEGATIVE

## 2025-02-25 NOTE — TELEPHONE ENCOUNTER
SN/IVÁN ([facility]),  Please see below [employer] message and advise.  OV completed 2/24/25  Appears Covid result not visible to patient via [email or web address]  Can provide negative result update to patient and review at home care advice - any other recommendations at this time?  Thanks,  Francoise TOUSSAINT RN

## 2025-02-26 NOTE — RESULT ENCOUNTER NOTE
Hello,    All the tests for COVID flu A and B were negative.  You likely had a different viral infection as long as it is improving I would still follow the same advice that I gave you.  Let me know if you have questions.    Jesika Gutierrez MD

## 2025-04-09 ENCOUNTER — OFFICE VISIT (OUTPATIENT)
Dept: MIDWIFE SERVICES | Facility: CLINIC | Age: 35
End: 2025-04-09
Payer: COMMERCIAL

## 2025-04-09 VITALS
DIASTOLIC BLOOD PRESSURE: 79 MMHG | HEART RATE: 70 BPM | SYSTOLIC BLOOD PRESSURE: 128 MMHG | HEIGHT: 65 IN | BODY MASS INDEX: 47.98 KG/M2 | WEIGHT: 288 LBS

## 2025-04-09 DIAGNOSIS — Z00.00 ROUTINE HEALTH MAINTENANCE: ICD-10-CM

## 2025-04-09 DIAGNOSIS — Z11.3 ROUTINE SCREENING FOR STI (SEXUALLY TRANSMITTED INFECTION): Primary | ICD-10-CM

## 2025-04-09 DIAGNOSIS — E66.01 OBESITY, CLASS III, BMI 40-49.9 (MORBID OBESITY) (H): ICD-10-CM

## 2025-04-09 DIAGNOSIS — Z83.3 FAMILY HISTORY OF DIABETES MELLITUS: ICD-10-CM

## 2025-04-09 LAB
ALBUMIN SERPL BCG-MCNC: 4.3 G/DL (ref 3.5–5.2)
ALP SERPL-CCNC: 73 U/L (ref 40–150)
ALT SERPL W P-5'-P-CCNC: 11 U/L (ref 0–50)
ANION GAP SERPL CALCULATED.3IONS-SCNC: 11 MMOL/L (ref 7–15)
AST SERPL W P-5'-P-CCNC: 19 U/L (ref 0–45)
BILIRUB SERPL-MCNC: 0.2 MG/DL
BUN SERPL-MCNC: 16.7 MG/DL (ref 6–20)
CALCIUM SERPL-MCNC: 9.5 MG/DL (ref 8.8–10.4)
CHLORIDE SERPL-SCNC: 105 MMOL/L (ref 98–107)
CHOLEST SERPL-MCNC: 122 MG/DL
CLUE CELLS: ABNORMAL
CREAT SERPL-MCNC: 0.83 MG/DL (ref 0.51–0.95)
EGFRCR SERPLBLD CKD-EPI 2021: >90 ML/MIN/1.73M2
ERYTHROCYTE [DISTWIDTH] IN BLOOD BY AUTOMATED COUNT: 14.8 % (ref 10–15)
EST. AVERAGE GLUCOSE BLD GHB EST-MCNC: 108 MG/DL
FASTING STATUS PATIENT QL REPORTED: NO
FASTING STATUS PATIENT QL REPORTED: NO
GLUCOSE SERPL-MCNC: 83 MG/DL (ref 70–99)
HBA1C MFR BLD: 5.4 % (ref 0–5.6)
HCO3 SERPL-SCNC: 23 MMOL/L (ref 22–29)
HCT VFR BLD AUTO: 40.5 % (ref 35–47)
HDLC SERPL-MCNC: 36 MG/DL
HGB BLD-MCNC: 13.2 G/DL (ref 11.7–15.7)
LDLC SERPL CALC-MCNC: 78 MG/DL
MCH RBC QN AUTO: 27 PG (ref 26.5–33)
MCHC RBC AUTO-ENTMCNC: 32.6 G/DL (ref 31.5–36.5)
MCV RBC AUTO: 83 FL (ref 78–100)
NONHDLC SERPL-MCNC: 86 MG/DL
PLATELET # BLD AUTO: 217 10E3/UL (ref 150–450)
POTASSIUM SERPL-SCNC: 4.3 MMOL/L (ref 3.4–5.3)
PROT SERPL-MCNC: 7.9 G/DL (ref 6.4–8.3)
RBC # BLD AUTO: 4.88 10E6/UL (ref 3.8–5.2)
SODIUM SERPL-SCNC: 139 MMOL/L (ref 135–145)
TRICHOMONAS, WET PREP: ABNORMAL
TRIGL SERPL-MCNC: 40 MG/DL
TSH SERPL DL<=0.005 MIU/L-ACNC: 1.32 UIU/ML (ref 0.3–4.2)
WBC # BLD AUTO: 10.5 10E3/UL (ref 4–11)
WBC'S/HIGH POWER FIELD, WET PREP: ABNORMAL
YEAST, WET PREP: ABNORMAL

## 2025-04-09 NOTE — PROGRESS NOTES
"SUBJECTIVE:  Yael Johnson is an 34 year old woman who presents with vaginal discharge that has been happening for the last 3 days. She reports a \"jelly-like\" bleeding type discharge that has been different than she has experienced before. Reports regular, predictable periods with LMP of 3/21. She reports she took plan B on 3/29. Discussed that that may be contributing to the bleeding she is experiencing. No birth control and reports no plans to be sexually active at this time. Known myoma with recent, unconcerning us-reviewed.     Asked if she may have PCOS, discussed dx criteria for PCOS and her recent gyn us and normal cycles would not qualify her in this dx. Patient agreed. Reports she is struggling to lose weight and thought maybe that contributed. Discussed getting routine health labs. She has an appointment with her PCP this summer to follow up with regarding weight and options.         Current Outpatient Medications   Medication Sig Dispense Refill    albuterol (PROAIR HFA/PROVENTIL HFA/VENTOLIN HFA) 108 (90 Base) MCG/ACT inhaler Inhale 2 puffs into the lungs every 6 hours as needed for shortness of breath, wheezing or cough. 18 g 0    fluticasone-salmeterol (ADVAIR) 100-50 MCG/ACT inhaler Inhale 1 puff into the lungs every 12 hours. 14 each 0    guaiFENesin-codeine (ROBITUSSIN AC) 100-10 MG/5ML solution Take 5-10 mLs by mouth every 6 hours as needed for cough. 118 mL 0     No current facility-administered medications for this visit.     Allergies   Allergen Reactions    Soy Allergy (Obsolete) Itching and Swelling    Cats     Copper Other (See Comments)     \"breakouts\"    Dogs        Social History     Tobacco Use    Smoking status: Never    Smokeless tobacco: Never   Substance Use Topics    Alcohol use: Yes     Comment: few times a month       OBJECTIVE:  /79   Pulse 70   Ht 1.651 m (5' 5\")   Wt 130.6 kg (288 lb)   LMP 03/21/2025   BMI 47.93 kg/m    Pelvic: External genitalia and vagina " normal. Bimanual and rectovaginal normal. Bleeding appeared to be end of menstrual cycle bleeding.     ASSESSMENT:  vaginitis    PLAN:  1. Routine screening for STI (sexually transmitted infection) (Primary)    - Wet preparation; Future  - Chlamydia trachomatis/Neisseria gonorrhoeae by PCR  - Wet preparation    2. Routine health maintenance  - Hemoglobin A1c; Future  - CBC with platelets; Future  - TSH with free T4 reflex; Future  - Lipid Profile (Chol, Trig, HDL, LDL calc); Future  - Comprehensive metabolic panel (BMP + Alb, Alk Phos, ALT, AST, Total. Bili, TP); Future  - CBC with platelets  - TSH with free T4 reflex  - Lipid Profile (Chol, Trig, HDL, LDL calc)  - Comprehensive metabolic panel (BMP + Alb, Alk Phos, ALT, AST, Total. Bili, TP)    Abnormal routine health serum labs: will follow up with PCP    Reva GARZA, TRUNGM, MPH

## 2025-04-10 LAB
C TRACH DNA SPEC QL PROBE+SIG AMP: NEGATIVE
N GONORRHOEA DNA SPEC QL NAA+PROBE: NEGATIVE
SPECIMEN TYPE: NORMAL

## 2025-04-14 ENCOUNTER — MYC MEDICAL ADVICE (OUTPATIENT)
Dept: MIDWIFE SERVICES | Facility: CLINIC | Age: 35
End: 2025-04-14
Payer: COMMERCIAL

## 2025-04-15 ENCOUNTER — TELEPHONE (OUTPATIENT)
Dept: FAMILY MEDICINE | Facility: CLINIC | Age: 35
End: 2025-04-15
Payer: COMMERCIAL

## 2025-04-15 DIAGNOSIS — J45.40 MODERATE PERSISTENT ASTHMA WITHOUT COMPLICATION: ICD-10-CM

## 2025-04-15 NOTE — TELEPHONE ENCOUNTER
Prior Authorization Retail Medication Request    Medication/Dose: albuterol (PROAIR HFA/PROVENTIL HFA/VENTOLIN HFA) 108 (90 Base) MCG/ACT inhaler   Diagnosis and ICD code (if different than what is on RX):    Moderate persistent asthma without complication          Insurance   Primary: [facility]   Insurance ID:  [id number]     Pharmacy Information (if different than what is on RX)  Name:  [id number] [place] [address] [address] AT [facility]   Phone:  [phone]  Fax: [phone]

## 2025-04-18 NOTE — TELEPHONE ENCOUNTER
Please refrain from closing out PA encounters until it has been addressed fully by the Retail Central PA Team. Status will be updated to Approved/Denied/Dismissed/PA Not Needed. Closing/signing the encounters results in missed submission to the patient's insurance as that encounter disappears from our PA Pool and does not get sent. Thank you!  Retail Pharmacy Prior Authorization Team   Phone: [phone]    [facility] KEY: (Key: [id number])    PA Initiation    Medication: ALBUTEROL SULFATE  (90 BASE) MCG/ACT IN AERS  Insurance Company: [employer] [place] - Phone [phone] Fax [phone]  Pharmacy Filling the Rx: [id number] - [place], [place] - [address] AT [address] [facility]  Filling Pharmacy Phone:    Filling Pharmacy Fax:    Start Date: 4/18/2025

## 2025-04-21 ENCOUNTER — MYC MEDICAL ADVICE (OUTPATIENT)
Dept: FAMILY MEDICINE | Facility: CLINIC | Age: 35
End: 2025-04-21
Payer: COMMERCIAL

## 2025-04-21 NOTE — TELEPHONE ENCOUNTER
Per call to the [facility]'s in [place], they requested PA for the Albuterol-Budesonide (AIRSUPRA) 90-80 MCG/ACT AERO and NOT the albuterol (PROAIR HFA/PROVENTIL HFA/VENTOLIN HFA) 108 (90 Base) MCG/ACT inhaler.      Please call the patient to confirm this as the AIRSUPRA was discontinued on the patient's chart 08/07/2024 and she was changed to ADVAIR.    Per the pharmacy, the patient requested the AIRSUPRA and that needs a PA.

## 2025-04-21 NOTE — TELEPHONE ENCOUNTER
"Retail Pharmacy Prior Authorization Team   Phone: [phone]      PRIOR AUTHORIZATION DENIED    Medication: ALBUTEROL SULFATE  (90 BASE) MCG/ACT IN AERS  Insurance Company: [employer] [place] - Phone [phone] Fax [phone]  Denial Date: 4/18/2025  Denial Reason(s): INSURANCE PREFERS SPECIFIC INHALER NDC OF A GENERIC OR VENTOLIN HFA BRAND.      Appeal Information: To send an appeal to the patient's insurance, please provide a letter of medical necessity for the requested medication that was denied.  Please use the denial rationale from the patient's insurance to write the letter.  Once it is completed, please have it available under the \"letters tab\" in the patient's chart and route directly back to me: SURJIT HERNANDEZ and I can send the appeal to the patient's insurance.     Patient Notified: No. The [employer] [facility] PA Team does not notify of the denial outcomes as the patient often will ask what their provider will prescribe in place of the denied medication, or additional information in regards to other therapies they can take in place of the denied medication.  This is not something we can advise on in our department.    "

## 2025-04-22 RX ORDER — ALBUTEROL SULFATE AND BUDESONIDE 90; 80 UG/1; UG/1
1 AEROSOL, METERED RESPIRATORY (INHALATION) 2 TIMES DAILY
Qty: 10.7 G | Refills: 11 | Status: SHIPPED | OUTPATIENT
Start: 2025-04-22

## 2025-04-22 NOTE — TELEPHONE ENCOUNTER
"Covering provider -- patient is requesting a rescue inhaler. Seems that airspura may be covered but the just albuterol is not?    \"I used Airsupra for a rescue inhaler but I will use any rescue inhaler that Dr. Pelayo would send orders for. Thanks! I just like having a rescue inhaler in my purse.\"    Samina Gill RN  [facility]    "

## 2025-04-22 NOTE — TELEPHONE ENCOUNTER
Writer responded via [employer].  GUILLE GilbertN, RN-BC  [facility]

## 2025-04-22 NOTE — TELEPHONE ENCOUNTER
Writer responded via [employer].  GUILLE GilbertN, RN-BC  [facility]

## 2025-04-28 ENCOUNTER — PATIENT OUTREACH (OUTPATIENT)
Dept: CARE COORDINATION | Facility: CLINIC | Age: 35
End: 2025-04-28
Payer: COMMERCIAL

## 2025-04-30 ENCOUNTER — PATIENT OUTREACH (OUTPATIENT)
Dept: CARE COORDINATION | Facility: CLINIC | Age: 35
End: 2025-04-30
Payer: COMMERCIAL

## 2025-05-02 ENCOUNTER — MYC MEDICAL ADVICE (OUTPATIENT)
Dept: FAMILY MEDICINE | Facility: CLINIC | Age: 35
End: 2025-05-02
Payer: COMMERCIAL

## 2025-05-03 ENCOUNTER — HOSPITAL ENCOUNTER (EMERGENCY)
Facility: CLINIC | Age: 35
Discharge: LEFT BEFORE DISCHARGE | End: 2025-05-03
Attending: STUDENT IN AN ORGANIZED HEALTH CARE EDUCATION/TRAINING PROGRAM | Admitting: STUDENT IN AN ORGANIZED HEALTH CARE EDUCATION/TRAINING PROGRAM
Payer: COMMERCIAL

## 2025-05-03 VITALS
SYSTOLIC BLOOD PRESSURE: 123 MMHG | OXYGEN SATURATION: 100 % | HEART RATE: 78 BPM | TEMPERATURE: 97.5 F | BODY MASS INDEX: 48.26 KG/M2 | RESPIRATION RATE: 20 BRPM | HEIGHT: 65 IN | WEIGHT: 289.68 LBS | DIASTOLIC BLOOD PRESSURE: 86 MMHG

## 2025-05-03 DIAGNOSIS — K92.1 BLOOD IN STOOL: ICD-10-CM

## 2025-05-03 PROCEDURE — 99283 EMERGENCY DEPT VISIT LOW MDM: CPT

## 2025-05-03 PROCEDURE — 93005 ELECTROCARDIOGRAM TRACING: CPT

## 2025-05-03 ASSESSMENT — COLUMBIA-SUICIDE SEVERITY RATING SCALE - C-SSRS
2. HAVE YOU ACTUALLY HAD ANY THOUGHTS OF KILLING YOURSELF IN THE PAST MONTH?: NO
1. IN THE PAST MONTH, HAVE YOU WISHED YOU WERE DEAD OR WISHED YOU COULD GO TO SLEEP AND NOT WAKE UP?: NO
6. HAVE YOU EVER DONE ANYTHING, STARTED TO DO ANYTHING, OR PREPARED TO DO ANYTHING TO END YOUR LIFE?: NO

## 2025-05-03 ASSESSMENT — ACTIVITIES OF DAILY LIVING (ADL)
ADLS_ACUITY_SCORE: 41
ADLS_ACUITY_SCORE: 41

## 2025-05-03 NOTE — ED NOTES
Rectal exam chaperoned, performed by Dr. Olmstead, patient tolerated well. Litzy MCDERMOTT Son, RN

## 2025-05-03 NOTE — ED PROVIDER NOTES
Emergency Department Note      History of Present Illness     Chief Complaint   Rectal Bleeding      HPI   Yael Johnson is a 34 year old female presents with a few concerns.  Her biggest concern is that she has noticed increasing discoloration when stooling.  At first she noticed some maroon-colored blood with wiping after BM starting a week ago.  Over the last day she noticed that there was some red tinge liquid adjacent to her stool.  There is not clots of blood in the stool.  She has been drinking a lot of beet juice and she wonders if that is contributing.  No abdominal pain.  Not lightheaded or fatigue.  She otherwise feels well. No pain in the rectum.  No fever.  No nausea, vomiting, or difficulty eating.  She also says that she has had intermittent pinching sensation in her chest for very long time on the left-hand side does not currently there.  Not short of breath.  She also noted some irritation in her calf for well over a month but now it is resolved.  She has also noted a feeling of swelling of different areas of her body's from her arms to her legs to her earlobes that will come and go but is currently gone.  Never had a colonoscopy.  No family history of colon cancer at a young age or inflammatory bowel disease.    Independent Historian   None    Review of External Notes   April 25, 2025-office note noting reported hives.    Past Medical History     Medical History and Problem List   Past Medical History:   Diagnosis Date    Abnormal uterine bleeding (AUB) 11/28/2019    Asthma     Enlarged tonsils     History of edema 10/23/2020    Leg fatigue 10/23/2020    Low ferritin 04/11/2019    Palpitations 04/11/2019    Subluxation of left patella, sequela 12/20/2019    Uterine polyp 11/28/2019       Medications   albuterol (PROAIR HFA/PROVENTIL HFA/VENTOLIN HFA) 108 (90 Base) MCG/ACT inhaler  Albuterol-Budesonide (AIRSUPRA) 90-80 MCG/ACT AERO  cetirizine (ZYRTEC) 10 MG tablet  fluticasone-salmeterol  "(ADVAIR) 100-50 MCG/ACT inhaler  guaiFENesin-codeine (ROBITUSSIN AC) 100-10 MG/5ML solution        Surgical History   Past Surgical History:   Procedure Laterality Date    BIOPSY  2018    GYN SURGERY  2019    Vaginal polyop    US BREAST BIOPSY LT  2017    benign, still has a lump       Physical Exam     Patient Vitals for the past 24 hrs:   BP Temp Temp src Pulse Resp SpO2 Height Weight   05/03/25 1552 123/86 97.5  F (36.4  C) Temporal 78 20 100 % 1.651 m (5' 5\") 131.4 kg (289 lb 11 oz)     Physical Exam  GENERAL: Patient well-appearing  HEAD: Atraumatic.  NECK: No rigidity  Eyes: Eyelid conjunctiva pink.  CV: RRR, no murmurs, rubs or gallops  PULM: CTAB with good aeration; no retractions, rales, rhonchi, or wheezing  ABD: Soft, nontender, nondistended, no guarding  DERM: No rash. Skin warm and dry  EXTREMITY: Moving all extremities without difficulty.    Rectal: Female chaperone present-no blood.  No masses.  No melena.  No tenderness.  No lesions.    Diagnostics     Lab Results   Labs Ordered and Resulted from Time of ED Arrival to Time of ED Departure - No data to display    Imaging   No orders to display       EKG   ECG interpreted by me.  Time 1556  NSR at 79. No ST elevation or depression. Normal intervals. Normal axis.   No evidence of WPW, Brugada, HOCM, ARVD, ASD, or Wellen's.    Independent Interpretation   None    ED Course      Medications Administered   Medications - No data to display    Procedures   Procedures     Discussion of Management   None    ED Course        Additional Documentation  None    Medical Decision Making / Diagnosis     CMS Diagnoses: None    MIPS       None    MDM   Yael KO Alex is a 34 year old female   Presenting with a few concerns.  Her biggest concern is red discoloration liquid in her stool.  Rectal exam not showing any blood.  Vitals are reassuring.  She has no systemic symptoms of anemia.  I do not think she requires a labs or emergent imaging.  I recommend she follows " up with her doctor if it persists then they can consider further workup if needed.  She also has concerns for swelling in different parts of her body but it is now fully resolved.  She also mentions some irritation in her calf that is resolved.  I do not think she requires an ultrasound.  Exam not consistent with DVT.  She also has a pinching sensation in her chest.  ECG is unremarkable and it is currently gone.  I do not think she requires a cardiac workup.  I have evaluated the patient for acute medical emergencies and have clinically decided no further acute medical interventions are required. Reassessed multiple times and well appearing. Patient stable for discharge. All questions answered. Given strict return precautions. Patient content with plan. The differential diagnosis and treatment modalities were discussed thoroughly with the patient. Recommended PCP follow-up routinely.      Disposition   The patient was discharged.     Diagnosis     ICD-10-CM    1. Blood in stool  K92.1            Discharge Medications   Discharge Medication List as of 5/3/2025  5:29 PM            MD Ishan Landrum Kevin, MD  05/03/25 0136

## 2025-05-03 NOTE — ED TRIAGE NOTES
"Pt presents with multiple complaints. Reports 1 wk ago episode of maroon appearing blood when wiping after a BM. Also happened yesterday appearing maroon with mucus.  pt reports she has started having beet juice weekly. Pt also reports L leg calf pain behind knee x 1 mos. Intermittent \"pinching in chest\" for a \"long time.\" Denies pain in triage. A & Ox4.      Triage Assessment (Adult)       Row Name 05/03/25 1548          Triage Assessment    Airway WDL WDL        Respiratory WDL    Respiratory WDL WDL        Cognitive/Neuro/Behavioral WDL    Cognitive/Neuro/Behavioral WDL WDL                     "

## 2025-05-05 LAB
ATRIAL RATE - MUSE: 79 BPM
DIASTOLIC BLOOD PRESSURE - MUSE: NORMAL MMHG
INTERPRETATION ECG - MUSE: NORMAL
P AXIS - MUSE: 43 DEGREES
PR INTERVAL - MUSE: 162 MS
QRS DURATION - MUSE: 100 MS
QT - MUSE: 374 MS
QTC - MUSE: 428 MS
R AXIS - MUSE: -18 DEGREES
SYSTOLIC BLOOD PRESSURE - MUSE: NORMAL MMHG
T AXIS - MUSE: 49 DEGREES
VENTRICULAR RATE- MUSE: 79 BPM

## 2025-05-05 NOTE — TELEPHONE ENCOUNTER
Patient was seen in ED for this on 5/3 and largely unremarkable workup. Advised to follow up with PCP routinely. Patient scheduled for 5/12 at [facility].    Writer replied to patient via [employer].    GUILLE CavazosN, PHN, AMB-BC (she/her)  [facility] RN

## 2025-05-12 ENCOUNTER — TELEPHONE (OUTPATIENT)
Dept: FAMILY MEDICINE | Facility: CLINIC | Age: 35
End: 2025-05-12

## 2025-05-12 ENCOUNTER — OFFICE VISIT (OUTPATIENT)
Dept: FAMILY MEDICINE | Facility: CLINIC | Age: 35
End: 2025-05-12
Payer: COMMERCIAL

## 2025-05-12 VITALS
TEMPERATURE: 98.2 F | DIASTOLIC BLOOD PRESSURE: 64 MMHG | WEIGHT: 282.7 LBS | HEART RATE: 84 BPM | RESPIRATION RATE: 20 BRPM | SYSTOLIC BLOOD PRESSURE: 118 MMHG | BODY MASS INDEX: 47.1 KG/M2 | HEIGHT: 65 IN | OXYGEN SATURATION: 95 %

## 2025-05-12 DIAGNOSIS — Z11.1 SCREENING FOR TUBERCULOSIS: ICD-10-CM

## 2025-05-12 DIAGNOSIS — E22.1 HYPERPROLACTINEMIA: ICD-10-CM

## 2025-05-12 DIAGNOSIS — R94.31 ABNORMAL ELECTROCARDIOGRAM: Primary | ICD-10-CM

## 2025-05-12 DIAGNOSIS — R63.5 EXCESSIVE WEIGHT GAIN: ICD-10-CM

## 2025-05-12 DIAGNOSIS — Z00.00 ROUTINE GENERAL MEDICAL EXAMINATION AT A HEALTH CARE FACILITY: Primary | ICD-10-CM

## 2025-05-12 LAB
ESTRADIOL SERPL-MCNC: 622 PG/ML
FSH SERPL IRP2-ACNC: 1.9 MIU/ML
LH SERPL-ACNC: 11.8 MIU/ML
PROGEST SERPL-MCNC: 0.4 NG/ML
PROLACTIN SERPL 3RD IS-MCNC: 41 NG/ML (ref 5–23)
T3 SERPL-MCNC: 106 NG/DL (ref 85–202)
T4 FREE SERPL-MCNC: 1.21 NG/DL (ref 0.9–1.7)
TSH SERPL DL<=0.005 MIU/L-ACNC: 1.01 UIU/ML (ref 0.3–4.2)

## 2025-05-12 PROCEDURE — 36415 COLL VENOUS BLD VENIPUNCTURE: CPT

## 2025-05-12 PROCEDURE — 84443 ASSAY THYROID STIM HORMONE: CPT

## 2025-05-12 PROCEDURE — 99395 PREV VISIT EST AGE 18-39: CPT | Mod: 25

## 2025-05-12 PROCEDURE — 83001 ASSAY OF GONADOTROPIN (FSH): CPT

## 2025-05-12 PROCEDURE — 84146 ASSAY OF PROLACTIN: CPT

## 2025-05-12 PROCEDURE — 99213 OFFICE O/P EST LOW 20 MIN: CPT | Mod: 25

## 2025-05-12 PROCEDURE — 90471 IMMUNIZATION ADMIN: CPT

## 2025-05-12 PROCEDURE — 83002 ASSAY OF GONADOTROPIN (LH): CPT

## 2025-05-12 PROCEDURE — 84144 ASSAY OF PROGESTERONE: CPT

## 2025-05-12 PROCEDURE — 3078F DIAST BP <80 MM HG: CPT

## 2025-05-12 PROCEDURE — 90677 PCV20 VACCINE IM: CPT

## 2025-05-12 PROCEDURE — 3074F SYST BP LT 130 MM HG: CPT

## 2025-05-12 PROCEDURE — 84439 ASSAY OF FREE THYROXINE: CPT

## 2025-05-12 PROCEDURE — 82670 ASSAY OF TOTAL ESTRADIOL: CPT

## 2025-05-12 PROCEDURE — 84480 ASSAY TRIIODOTHYRONINE (T3): CPT

## 2025-05-12 PROCEDURE — 86481 TB AG RESPONSE T-CELL SUSP: CPT

## 2025-05-12 RX ORDER — AZELAIC ACID 0.15 G/G
GEL TOPICAL 2 TIMES DAILY
COMMUNITY
Start: 2025-05-02

## 2025-05-12 RX ORDER — SPIRONOLACTONE 50 MG/1
50 TABLET, FILM COATED ORAL DAILY
COMMUNITY
Start: 2025-05-02

## 2025-05-12 RX ORDER — BENZOYL PEROXIDE 50 MG/G
EMULSION TOPICAL AT BEDTIME
COMMUNITY
Start: 2025-05-06

## 2025-05-12 RX ORDER — MEGESTROL ACETATE 40 MG/1
40 TABLET ORAL 2 TIMES DAILY
COMMUNITY
Start: 2024-09-18 | End: 2025-05-12

## 2025-05-12 RX ORDER — METRONIDAZOLE TOPICAL 7.5 MG/G
GEL TOPICAL 2 TIMES DAILY
COMMUNITY
Start: 2025-05-02

## 2025-05-12 RX ORDER — TRETINOIN 0.25 MG/G
CREAM TOPICAL AT BEDTIME
COMMUNITY
Start: 2025-05-02

## 2025-05-12 RX ORDER — DOXYCYCLINE 100 MG/1
100 CAPSULE ORAL 2 TIMES DAILY
COMMUNITY
Start: 2025-05-02

## 2025-05-12 NOTE — PROGRESS NOTES
Preventive Care Visit  [facility]  Sara Contreras MD, Family Medicine  May 12, 2025      Assessment & Plan     Routine general medical examination at a health care facility  -Lipid panel obtained 25.  -Glu (83) obtained 25 with CMP  - A1c (5.4) obtained 25  - Pap smear: Obtained results pending.  Last Pap in 23, NILM & HPV neg  -BMI 46.82 with weight 282 lb.    -Nothing for birth control.   - STD testing declined today   - Flu and covid vaccine given. TDAP and HPV vaccine UTD  - Tobacco use? Does not smoke or vape  - Alcohol use? One drink every couple of weeks  - Pneumococcal 20 Valent Conjugate (Prevnar 20)  - Review vaccines; pneumococcal vaccine due due to asthma.  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  - Order TB test, which is a blood draw.  - Quantiferon-TB Gold Plus    Excessive weight gain  [facility], possibly related to hormonal imbalance. Her current weight is 282 lbs, with a history of weight fluctuations. Acne has been addressed by a dermatologist. Hormonal evaluation is ordered to assess potential premenopausal status even though I discussed with patient that hormone testing is not necessary for diagnosis. Her LMP is  - is on cycle day 23 today. (Luteal phase)  - Order hormonal labs including thyroid levels, prolactin, FSH, LH, progesterone, and estradiol.  - Patient is on cycle day 23 as of May 12, 2025.  - Consideration of estrogen patch or birth control pills for hormone balance if premenopausal status is confirmed, however another visit will be required to discuss this option with patient if she desires treatment  - TSH  - T4 free  - T3 total  - Estradiol  - Progesterone  - Luteinizing Hormone  - Follicle stimulating hormone  - Prolactin    Assessment  "& Plan  Routine General Medical Examination:    - Continue routine health maintenance.  - No STD testing requested.    BMI  Estimated body mass index is 46.82 kg/m  as calculated from the following:    Height as of this encounter: 1.655 m (5' 5.16\").    Weight as of this encounter: 128.2 kg (282 lb 11.2 oz).   Weight management plan: Discussed healthy diet and exercise guidelines      Subjective   Yael is a 34 year old, presenting for the following:  Physical (Was seen in ED at [facility] 5/3 for pain in calf and blood in stool; workup was negative; Struggles with weight loss and would like hormones checked, and thyroid, would like TB test for work )        5/12/2025     2:23 PM   Additional Questions   Roomed by ERON PYLE          Healthy Habits:     Taking medications regularly:  1  History of Present Illness       Reason for visit:  Annual preventative visit She is missing 1 dose(s) of medications per week.    Routine annual physical:   Yael underwent a general physical examination, which revealed no abnormalities. Her blood pressure is normal at 118/64. She is not currently on any medications for birth control.  She does not smoke or vape and drinks alcohol infrequently, about one drink every couple of weeks. Yael does not have a family history of breast, colon, or lung cancer. Her mother has high blood pressure, diabetes, bipolar disorder, arthritis, and obesity. Her brother has bipolar disorder, and her grandmother has diabetes, cardiovascular disease, and high blood pressure. She does not have any of these conditions herself.    Vaccination and Health Screening:  She has received the HPV vaccine and pneumococcal vaccine but requires an updated pneumonia vaccine due to her asthma. Her previous health screenings did not indicate diabetes, high cholesterol, or high blood pressure.    Hormonal Imbalance Concerns:  Yael reports that she struggles with weight loss and acne on one side of her face, leading her " to suspect a possible hormonal imbalance. She has been prescribed megestrol in the past to stop her period for a vacation, but she is not currently on any birth control or hormone medication. Her menstrual cycle is regular, with the last period starting on April 20, 2025. She has recently started an online weight loss program with injections and has experienced some weight loss.    Advance Care Planning    Discussed advance care planning with patient; however, patient declined at this time.        1/29/2023   General Health   Feel stress (tense, anxious, or unable to sleep) Rather much         8/8/2023   Nutrition   Three or more servings of calcium each day? No   Diet: regular (no restrictions)         8/8/2023   Exercise   Frequency of exercise: None         1/29/2023   Social Factors   Frequency of gathering with friends or relatives Once a week         8/8/2023   Dental   Dentist two times every year? No         Today's PHQ-9 Score:       2/24/2025     7:06 AM   PHQ-9 SCORE   PHQ-9 Total Score MyChart 5 (Mild depression)   PHQ-9 Total Score 5        Patient-reported           8/8/2023   Substance Use   Alcohol more than 3/day or more than 7/wk No     Social History     Tobacco Use    Smoking status: Never     Passive exposure: Never    Smokeless tobacco: Never   Vaping Use    Vaping status: Never Used   Substance Use Topics    Alcohol use: Yes     Comment: few times a month    Drug use: No          Mammogram Screening - Patient under 40 years of age: Routine Mammogram Screening not recommended.       History of abnormal Pap smear: No - age 30-64 HPV with reflex Pap every 5 years recommended        9/20/2019    12:00 AM 10/7/2016    12:00 AM   PAP / HPV   PAP (Historical)  NIL    PAP-ABSTRACT See Scanned Document              No data to display                 Reviewed and updated as needed this visit by Provider                    Past Medical History:   Diagnosis Date    Abnormal uterine bleeding (AUB)  2019    Asthma     Enlarged tonsils     History of edema 10/23/2020    Leg fatigue 10/23/2020    Low ferritin 2019    Palpitations 2019    Subluxation of left patella, sequela 2019    Uterine polyp 2019     Past Surgical History:   Procedure Laterality Date    BIOPSY  2018    GYN SURGERY  2019    Vaginal polyop    US BREAST BIOPSY LT  2017    benign, still has a lump     OB History    Para Term  AB Living   0 0 0 0 0 0   SAB IAB Ectopic Multiple Live Births   0 0 0 0 0     BP Readings from Last 3 Encounters:   25 118/64   25 123/86   25 128/79    Wt Readings from Last 3 Encounters:   25 128.2 kg (282 lb 11.2 oz)   25 131.4 kg (289 lb 11 oz)   25 130.6 kg (288 lb)                  Current Outpatient Medications   Medication Sig Dispense Refill    Albuterol-Budesonide (AIRSUPRA) 90-80 MCG/ACT AERO Inhale 1 puff into the lungs 2 times daily. 10.7 g 11    azelaic acid (FINACIA) 15 % external gel Apply topically 2 times daily.      BP WASH 5 % external liquid Apply topically at bedtime.      doxycycline monohydrate (MONODOX) 100 MG capsule Take 100 mg by mouth 2 times daily.      fluticasone-salmeterol (ADVAIR) 100-50 MCG/ACT inhaler Inhale 1 puff into the lungs every 12 hours. 14 each 0    metroNIDAZOLE (METROGEL) 0.75 % external gel Apply topically 2 times daily.      spironolactone (ALDACTONE) 50 MG tablet Take 50 mg by mouth daily.      tretinoin (RETIN-A) 0.025 % external cream Apply topically at bedtime.      cetirizine (ZYRTEC) 10 MG tablet Take 1 tablet (10 mg) by mouth daily. (Patient not taking: Reported on 2025) 30 tablet 0     Recent Labs   Lab Test 25  1523 25  1518 24  1401 23  1245 22  0618 22  1223 20  1109 19  1419   A1C  --  5.4  --  5.6  --  5.6  --   --    LDL  --  78  --  77  --  59 70  --    HDL  --  36*  --  34*  --  36* 31*  --    TRIG  --  40  --  40  --  38 25  --   "  ALT  --  11 13 10   < > 21 21 19   CR  --  0.83 0.80 0.75   < > 0.81 0.73 0.90   GFRESTIMATED  --  >90 >90 >90   < > >90 >90 88   GFRESTBLACK  --   --   --   --   --   --  >90 >90   POTASSIUM  --  4.3 3.5 4.2   < > 3.8 3.9 3.7   TSH 1.01 1.32  --  0.83   < > 1.02 0.98  --     < > = values in this interval not displayed.         Objective    Exam  /64   Pulse 84   Temp 98.2  F (36.8  C) (Oral)   Resp 20   Ht 1.655 m (5' 5.16\")   Wt 128.2 kg (282 lb 11.2 oz)   LMP 04/20/2025 (Approximate)   SpO2 95%   BMI 46.82 kg/m     Estimated body mass index is 46.82 kg/m  as calculated from the following:    Height as of this encounter: 1.655 m (5' 5.16\").    Weight as of this encounter: 128.2 kg (282 lb 11.2 oz).    Physical Exam  GENERAL: alert and no distress  EYES: Eyes grossly normal to inspection, PERRL and conjunctivae and sclerae normal  HENT: ear canals and TM's normal, nose and mouth without ulcers or lesions  NECK: no adenopathy, no asymmetry, masses, or scars  RESP: lungs clear to auscultation - no rales, rhonchi or wheezes  CV: regular rate and rhythm, normal S1 S2, no S3 or S4, no murmur, click or rub, no peripheral edema  ABDOMEN: soft, nontender, no hepatosplenomegaly, no masses and bowel sounds normal  MS: no gross musculoskeletal defects noted, trace edema in legs bilaterally  SKIN: no suspicious lesions or rashes  NEURO: Normal strength and tone, mentation intact and speech normal  PSYCH: mentation appears normal, affect normal/bright    In addition to the preventive visit, 40  minutes of the appointment were spent evaluating and developing a treatment plan for her additional concern(s), abnormal hormone levels, weight gain and hyperprolactinemia       Signed Electronically by: Sara Contreras MD    "

## 2025-05-12 NOTE — PATIENT INSTRUCTIONS
Patient Education   Preventive Care Advice   This is general advice given by our system to help you stay healthy. However, your care team may have specific advice just for you. Please talk to your care team about your preventive care needs.  Nutrition  Eat 5 or more servings of fruits and vegetables each day.  Try wheat bread, brown rice and whole grain pasta (instead of white bread, rice, and pasta).  Get enough calcium and vitamin D. Check the label on foods and aim for 100% of the RDA (recommended daily allowance).  Lifestyle  Exercise at least 150 minutes each week  (30 minutes a day, 5 days a week).  Do muscle strengthening activities 2 days a week. These help control your weight and prevent disease.  No smoking.  Wear sunscreen to prevent skin cancer.  Have a dental exam and cleaning every 6 months.  Yearly exams  See your health care team every year to talk about:  Any changes in your health.  Any medicines your care team has prescribed.  Preventive care, family planning, and ways to prevent chronic diseases.  Shots (vaccines)   HPV shots (up to age 26), if you've never had them before.  Hepatitis B shots (up to age 59), if you've never had them before.  COVID-19 shot: Get this shot when it's due.  Flu shot: Get a flu shot every year.  Tetanus shot: Get a tetanus shot every 10 years.  Pneumococcal, hepatitis A, and RSV shots: Ask your care team if you need these based on your risk.  Shingles shot (for age 50 and up)  General health tests  Diabetes screening:  Starting at age 35, Get screened for diabetes at least every 3 years.  If you are younger than age 35, ask your care team if you should be screened for diabetes.  Cholesterol test: At age 39, start having a cholesterol test every 5 years, or more often if advised.  Bone density scan (DEXA): At age 50, ask your care team if you should have this scan for osteoporosis (brittle bones).  Hepatitis C: Get tested at least once in your life.  STIs (sexually  transmitted infections)  Before age 24: Ask your care team if you should be screened for STIs.  After age 24: Get screened for STIs if you're at risk. You are at risk for STIs (including HIV) if:  You are sexually active with more than one person.  You don't use condoms every time.  You or a partner was diagnosed with a sexually transmitted infection.  If you are at risk for HIV, ask about PrEP medicine to prevent HIV.  Get tested for HIV at least once in your life, whether you are at risk for HIV or not.  Cancer screening tests  Cervical cancer screening: If you have a cervix, begin getting regular cervical cancer screening tests starting at age 21.  Breast cancer scan (mammogram): If you've ever had breasts, begin having regular mammograms starting at age 40. This is a scan to check for breast cancer.  Colon cancer screening: It is important to start screening for colon cancer at age 45.  Have a colonoscopy test every 10 years (or more often if you're at risk) Or, ask your provider about stool tests like a FIT test every year or Cologuard test every 3 years.  To learn more about your testing options, visit:   .  For help making a decision, visit:   https://bit.ly/ja77131.  Prostate cancer screening test: If you have a prostate, ask your care team if a prostate cancer screening test (PSA) at age 55 is right for you.  Lung cancer screening: If you are a current or former smoker ages 50 to 80, ask your care team if ongoing lung cancer screenings are right for you.  For informational purposes only. Not to replace the advice of your health care provider. Copyright   2023 [employer]. All rights reserved. Clinically reviewed by the [facility] Transitions Program. [employer] [id number] - REV 01/24.

## 2025-05-13 ENCOUNTER — RESULTS FOLLOW-UP (OUTPATIENT)
Dept: FAMILY MEDICINE | Facility: CLINIC | Age: 35
End: 2025-05-13

## 2025-05-13 ENCOUNTER — MYC MEDICAL ADVICE (OUTPATIENT)
Dept: FAMILY MEDICINE | Facility: CLINIC | Age: 35
End: 2025-05-13
Payer: COMMERCIAL

## 2025-05-13 LAB
QUANTIFERON MITOGEN: 10 IU/ML
QUANTIFERON NIL TUBE: 0.04 IU/ML
QUANTIFERON TB1 TUBE: 0.06 IU/ML
QUANTIFERON TB2 TUBE: 0.06

## 2025-05-13 NOTE — TELEPHONE ENCOUNTER
Covering clinician - see below patient request for cards referral. EKG showed septal infarct age undetermined and was otherwise largely unremarkable, and any symptoms had resolved prior to ED discharge. Appreciate review and advisement.    GUILLE CavazosN, PHN, AMB-BC (she/her)  [facility] RN

## 2025-05-13 NOTE — TELEPHONE ENCOUNTER
Order/Referral Request    Who is requesting: patient    Orders being requested: referral for Cardiology     Reason service is needed/diagnosis: had an abnormal EKG, that read she had a septal infarct    When are orders needed by: ASAP    Has this been discussed with Provider: No    Does patient have a preference on a Group/Provider/Facility? No preference     Does patient have an appointment scheduled?: No    Where to send orders: Place orders within Epic    Could we send this information to you in [employer] or would you prefer to receive a phone call?:   Patient would like to be contacted via [employer]

## 2025-05-14 ENCOUNTER — PATIENT OUTREACH (OUTPATIENT)
Dept: CARE COORDINATION | Facility: CLINIC | Age: 35
End: 2025-05-14
Payer: COMMERCIAL

## 2025-05-14 LAB
GAMMA INTERFERON BACKGROUND BLD IA-ACNC: 0.04 IU/ML
M TB IFN-G BLD-IMP: NEGATIVE
M TB IFN-G CD4+ BCKGRND COR BLD-ACNC: 9.96 IU/ML
MITOGEN IGNF BCKGRD COR BLD-ACNC: 0.02 IU/ML
MITOGEN IGNF BCKGRD COR BLD-ACNC: 0.02 IU/ML

## 2025-05-17 ENCOUNTER — MYC MEDICAL ADVICE (OUTPATIENT)
Dept: FAMILY MEDICINE | Facility: CLINIC | Age: 35
End: 2025-05-17
Payer: COMMERCIAL

## 2025-05-27 ENCOUNTER — VIRTUAL VISIT (OUTPATIENT)
Dept: FAMILY MEDICINE | Facility: CLINIC | Age: 35
End: 2025-05-27
Payer: COMMERCIAL

## 2025-05-27 DIAGNOSIS — Z02.89 ENCOUNTER FOR REVIEW OF FORM WITH PATIENT: Primary | ICD-10-CM

## 2025-05-27 PROCEDURE — 98006 SYNCH AUDIO-VIDEO EST MOD 30: CPT

## 2025-05-27 NOTE — PROGRESS NOTES
Yael is a 34 year old who is being evaluated via a billable video visit.    How would you like to obtain your AVS? MyChart  If the video visit is dropped, the invitation should be resent by: Text to cell phone: [phone]  Will anyone else be joining your video visit? No      Assessment & Plan     Encounter for review of form with patient   Patient needs a form filled out indicting that she can perform the physical requirements of the job, including lifting 50 lbs beyond her feet throughout the shift. She works as a CNA.  -I reviewed all of patient's past medical history and deemed that she is  physically fit to go back to work as a CNA  -patient still needs to sign the form so she can come pick it up from the  after Tuesday    Spent 30mins doing chart review, history and exam, patient education, lab review, result management, documentation and further activities per the note.    Subjective   Yael is a 34 year old, presenting for the following health issues:  Paperwork (Needs paperwork completed for work )        5/27/2025     5:36 PM   Additional Questions   Roomed by LASHANDA VELASQUEZ         5/27/2025   Forms   Any forms needing to be completed Yes     History of Present Illness       Reason for visit:  Paperwork    She eats 2-3 servings of fruits and vegetables daily.She consumes 1 sweetened beverage(s) daily.She exercises with enough effort to increase her heart rate 30 to 60 minutes per day.  She exercises with enough effort to increase her heart rate 3 or less days per week.   She is taking medications regularly.  Form to fill   Yael Johnson, a 34-year-old female, here to get her form filled out for her to go back to being a CNA at her job. She states that every year she needs to have a form filled out by her doctor evaluating her physical health and her past medical problem and then assessing her ability to go back to work.  She attended an annual physical visit a few weeks ago for 5/12. She has a  history of asthma, managed with an Advair inhaler, with no recent exacerbations or hospitalizations. She recently discovered a silent septal infarct, age undetermined, with no symptoms or medications related to heart disease. She reports seasonal allergies, managed with Zyrtec, and is allergic to dogs, cats, and soybean. Diagnosed with hormonal acne, she is under treatment with acne wash, creams, spironolactone, and doxycycline, prescribed by her dermatologist for inflammation. She reported a recent weight loss from 282 lbs to 276 lbs. She wears both glasses and contacts interchangeably, with her last vision test in July of the previous year, however couldn't remember her vision acuity.      Objective           Vitals:  No vitals were obtained today due to virtual visit.    Physical Exam   GENERAL: alert and no distress  EYES: Eyes grossly normal to inspection.  No discharge or erythema, or obvious scleral/conjunctival abnormalities.  RESP: No audible wheeze, cough, or visible cyanosis.    SKIN: Visible skin clear. No significant rash, abnormal pigmentation or lesions.  NEURO: Cranial nerves grossly intact.  Mentation and speech appropriate for age.  PSYCH: Appropriate affect, tone, and pace of words          Video-Visit Details    Type of service:  Video Visit   Originating Location (pt. Location): Home    Distant Location (provider location):  On-site  Platform used for Video Visit: Jean Paul  Signed Electronically by: Sara Contreras MD

## 2025-06-13 ENCOUNTER — HOSPITAL ENCOUNTER (OUTPATIENT)
Dept: MRI IMAGING | Facility: CLINIC | Age: 35
Discharge: HOME | End: 2025-06-13
Payer: COMMERCIAL

## 2025-06-13 DIAGNOSIS — E22.1 HYPERPROLACTINEMIA: ICD-10-CM

## 2025-06-13 PROCEDURE — A9585 GADOBUTROL INJECTION: HCPCS

## 2025-06-13 PROCEDURE — 255N000002 HC RX 255 OP 636

## 2025-06-13 PROCEDURE — 70553 MRI BRAIN STEM W/O & W/DYE: CPT

## 2025-06-13 RX ORDER — GADOBUTROL 604.72 MG/ML
0.1 INJECTION INTRAVENOUS ONCE
Status: COMPLETED | OUTPATIENT
Start: 2025-06-13 | End: 2025-06-13

## 2025-06-13 RX ADMIN — GADOBUTROL 12 ML: 604.72 INJECTION INTRAVENOUS at 09:20

## 2025-07-24 ENCOUNTER — OFFICE VISIT (OUTPATIENT)
Dept: CARDIOLOGY | Facility: CLINIC | Age: 35
End: 2025-07-24
Attending: PHYSICIAN ASSISTANT
Payer: COMMERCIAL

## 2025-07-24 VITALS
WEIGHT: 271.2 LBS | DIASTOLIC BLOOD PRESSURE: 70 MMHG | HEIGHT: 65 IN | BODY MASS INDEX: 45.18 KG/M2 | OXYGEN SATURATION: 96 % | SYSTOLIC BLOOD PRESSURE: 105 MMHG | HEART RATE: 80 BPM

## 2025-07-24 DIAGNOSIS — R94.31 ABNORMAL ELECTROCARDIOGRAM: ICD-10-CM

## 2025-07-24 NOTE — PROGRESS NOTES
HPI and Plan:   Yael Johnson is a 34 year old female who presents with abnormal ECG.  She has underlying morbid obesity and a history of asthma she was seen in the emergency department for rectal bleeding in May and she had also complained of some pinching sensation in her chest therefore they did perform an ECG I reviewed that ECG in the computer read as septal infarct in V1 and V2 there is negative deflection and no R wave.  We did repeat the ECG today, and the ECG today appears normal with normal R wave progression.  The previous ECG findings are likely related to lead placement.  Her chest pain symptoms are very atypical as a pinching sensation at rest that lasts about 5 minutes and then goes away and is pretty rare happens every couple months.  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  Exam today is unremarkable    Summary    1.  Abnormal ECG-likely due to lead placement, repeat ECG today appears normal    2.  Cardiovascular risk factors including morbid obesity and HDL deficiency-recommend regular aerobic exercise for weight reduction and raising HDL    I am happy to see her back at any time for any of her future cardiovascular needs, please feel free to contact me with any questions you have regards to her care         Today's clinic visit entailed:  Review of external notes as documented elsewhere in note  Review of the result(s) of each unique test - ECG, lipids    Provider  Link to [facility] Help Grid     The level of medical decision making during this visit was of moderate complexity.    Orders Placed This Encounter   Procedures    EKG 12-lead complete w/read - Clinics (performed today)     No orders of the defined types were placed in this encounter.    There are no discontinued  "medications.      Encounter Diagnosis   Name Primary?    Abnormal electrocardiogram        CURRENT MEDICATIONS:  Current Outpatient Medications   Medication Sig Dispense Refill    Albuterol-Budesonide (AIRSUPRA) 90-80 MCG/ACT AERO Inhale 1 puff into the lungs 2 times daily. 10.7 g 11    azelaic acid (FINACIA) 15 % external gel Apply topically 2 times daily.      BP WASH 5 % external liquid Apply topically at bedtime.      cetirizine (ZYRTEC) 10 MG tablet Take 1 tablet (10 mg) by mouth daily. 30 tablet 0    fluticasone-salmeterol (ADVAIR) 100-50 MCG/ACT inhaler Inhale 1 puff into the lungs every 12 hours. 14 each 0    metroNIDAZOLE (METROGEL) 0.75 % external gel Apply topically 2 times daily.      spironolactone (ALDACTONE) 50 MG tablet Take 50 mg by mouth daily.      tretinoin (RETIN-A) 0.025 % external cream Apply topically at bedtime.      doxycycline monohydrate (MONODOX) 100 MG capsule Take 100 mg by mouth 2 times daily. (Patient not taking: Reported on 7/24/2025)         ALLERGIES     Allergies   Allergen Reactions    Soy Allergy (Obsolete) Itching and Swelling    Cats     Copper Other (See Comments)     \"breakouts\"    Dogs        PAST MEDICAL HISTORY:  Past Medical History:   Diagnosis Date    Abnormal uterine bleeding (AUB) 11/28/2019    Asthma     Enlarged tonsils     History of edema 10/23/2020    Leg fatigue 10/23/2020    Low ferritin 04/11/2019    Palpitations 04/11/2019    Subluxation of left patella, sequela 12/20/2019    Uterine polyp 11/28/2019       PAST SURGICAL HISTORY:  Past Surgical History:   Procedure Laterality Date    BIOPSY  2018    GYN SURGERY  2019    Vaginal polyop    US BREAST BIOPSY LT  2017    benign, still has a lump       FAMILY HISTORY:  Family History   Problem Relation Age of Onset    Hypertension Mother     Diabetes Mother         type 2    Arthritis Mother     Bipolar Disorder Mother         per mom    Obesity Mother     Diabetes Maternal Grandmother         type 2    " Cerebrovascular Disease Maternal Grandmother     Hypertension Maternal Grandmother     Cardiovascular Paternal Grandfather         Lung    Diabetes Paternal Grandfather             Bipolar Disorder Brother        SOCIAL HISTORY:  Social History     Socioeconomic History    Marital status: Single     Spouse name: None    Number of children: None    Years of education: None    Highest education level: None   Tobacco Use    Smoking status: Never     Passive exposure: Never    Smokeless tobacco: Never   Vaping Use    Vaping status: Never Used   Substance and Sexual Activity    Alcohol use: Yes     Comment: few times a month    Drug use: No    Sexual activity: Yes     Partners: Male     Birth control/protection: Pull-out method     Comment: Longterm boyfriend of 2 years   Other Topics Concern    Parent/sibling w/ CABG, MI or angioplasty before 65F 55M? No   Social History Narrative    2024: New job, new insurance, living currently with dad and stepmom in [place] currently does not have a car        2023: moved to [place], lives with boyfriend, quit working at [employer] after 5 years yesterday.  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  Mom lives in [place].  1 brother lives in [facility] and a sister lives in [place] and has 2 children ages 16 and 2 years of age.    Moved to [place] 3/2023 & living with boyfriend, mom lives in [place] & moms  passed away recently. She recently got a car & drove here today        2022: to work nights [employer] service, studying to be a nurse, to start work at [employer] from mental health, lives with two roomamtes, back with boyfriend of 2 yrs        2020: working form home,         2019: lives with room mates, independantly, no pets         Care giver, sitting job, lives with family,      Social Drivers of  Health     Financial Resource Strain: Unknown (1/29/2023)    Overall Financial Resource Strain (CARDIA)     Difficulty of Paying Living Expenses: Patient declined   Food Insecurity: Unknown (1/29/2023)    Hunger Vital Sign     Worried About Running Out of Food in the Last Year: Patient declined     Ran Out of Food in the Last Year: Patient declined   Transportation Needs: No Transportation Needs (1/29/2023)    PRAPARE - Transportation     Lack of Transportation (Medical): No     Lack of Transportation (Non-Medical): No   Physical Activity: Insufficiently Active (1/29/2023)    Exercise Vital Sign     Days of Exercise per Week: 1 day     Minutes of Exercise per Session: 100 min   Stress: Stress Concern Present (1/29/2023)    [place] [employer] - Occupational Stress Questionnaire     Feeling of Stress : Rather much    Received from [facility] & [facility]    Social Connections   Interpersonal Safety: Low Risk  (2/24/2025)    Interpersonal Safety     Do you feel physically and emotionally safe where you currently live?: Yes     Within the past 12 months, have you been hit, slapped, kicked or otherwise physically hurt by someone?: No     Within the past 12 months, have you been humiliated or emotionally abused in other ways by your partner or ex-partner?: No   Housing Stability: Low Risk  (1/29/2023)    Housing Stability Vital Sign     Unable to Pay for Housing in the Last Year: No     Number of Places Lived in the Last Year: 1     Unstable Housing in the Last Year: No       Review of Systems:  Skin:        Eyes:       ENT:       Respiratory:  Positive for wheezing  Cardiovascular:  syncope or near-syncope, chest pain, fatigue, lightheadedness, dizziness Positive for, edema  Gastroenterology:      Genitourinary:       Musculoskeletal:       Neurologic:       Psychiatric:       Heme/Lymph/Imm:       Endocrine:         Physical Exam:  Vitals: /70   Pulse 80   Ht 1.651 m  "(5' 5\")   Wt 123 kg (271 lb 3.2 oz)   LMP 05/23/2025 (Exact Date)   SpO2 96%   BMI 45.13 kg/m      Constitutional:  cooperative obese      Skin:  warm and dry to the touch          Head:  normocephalic        Eyes:  pupils equal and round        Lymph:      ENT:  dentition good        Neck:  no carotid bruit        Respiratory:  clear to auscultation, normal symmetry         Cardiac: regular rhythm, no murmurs, gallops or rubs detected                pulses full and equal                                        GI:  abdomen soft        Extremities and Muscular Skeletal:  no deformities, clubbing, cyanosis, erythema observed, no edema              Neurological:  no gross motor deficits, affect appropriate        Psych:  Alert and Oriented x 3        Recent Lab Results:  LIPID RESULTS:  Lab Results   Component Value Date    CHOL 122 04/09/2025    CHOL 106 11/12/2020    HDL 36 (L) 04/09/2025    HDL 31 (L) 11/12/2020    LDL 78 04/09/2025    LDL 70 11/12/2020    TRIG 40 04/09/2025    TRIG 25 11/12/2020       LIVER ENZYME RESULTS:  Lab Results   Component Value Date    AST 19 04/09/2025    AST 13 11/12/2020    ALT 11 04/09/2025    ALT 21 11/12/2020       CBC RESULTS:  Lab Results   Component Value Date    WBC 10.5 04/09/2025    WBC 7.9 11/12/2020    RBC 4.88 04/09/2025    RBC 4.87 11/12/2020    HGB 13.2 04/09/2025    HGB 13.0 11/12/2020    HCT 40.5 04/09/2025    HCT 39.8 11/12/2020    MCV 83 04/09/2025    MCV 82 11/12/2020    MCH 27.0 04/09/2025    MCH 26.7 11/12/2020    MCHC 32.6 04/09/2025    MCHC 32.7 11/12/2020    RDW 14.8 04/09/2025    RDW 14.4 11/12/2020     04/09/2025     11/12/2020       BMP RESULTS:  Lab Results   Component Value Date     04/09/2025     11/12/2020    POTASSIUM 4.3 04/09/2025    POTASSIUM 3.6 05/30/2022    POTASSIUM 3.9 11/12/2020    CHLORIDE 105 04/09/2025    CHLORIDE 109 05/30/2022    CHLORIDE 108 11/12/2020    CO2 23 04/09/2025    CO2 22 05/30/2022    CO2 23 " 11/12/2020    ANIONGAP 11 04/09/2025    ANIONGAP 8 05/30/2022    ANIONGAP 6 11/12/2020    GLC 83 04/09/2025     (H) 05/30/2022    GLC 89 11/12/2020    BUN 16.7 04/09/2025    BUN 17 05/30/2022    BUN 14 11/12/2020    CR 0.83 04/09/2025    CR 0.73 11/12/2020    GFRESTIMATED >90 04/09/2025    GFRESTIMATED >90 11/12/2020    GFRESTBLACK >90 11/12/2020    RODNEY 9.5 04/09/2025    RODNEY 8.9 11/12/2020        A1C RESULTS:  Lab Results   Component Value Date    A1C 5.4 04/09/2025    A1C 5.3 03/08/2019       INR RESULTS:  Lab Results   Component Value Date    INR 1.0 12/10/2010           CC  Stephen Bowen PA-C  [address], [facility] [address]  [place] [address]

## 2025-07-24 NOTE — LETTER
7/24/2025    Angela Pelayo MD  [address]  [place] [address]    RE: Yael KO Johnson       Dear Colleague,     [facility] the [facility].  HPI and Plan:   Yael Johnson is a 34 year old female who presents with abnormal ECG.  She has underlying morbid obesity and a history of asthma she was seen in the emergency department for rectal bleeding in May and she had also complained of some pinching sensation in her chest therefore they did perform an ECG I reviewed that ECG in the computer read as septal infarct in V1 and V2 there is negative deflection and no R wave.  We did repeat the ECG today, and the ECG today appears normal with normal R wave progression.  The previous ECG findings are likely related to lead placement.  Her chest pain symptoms are very atypical as a pinching sensation at rest that lasts about 5 minutes and then goes away and is pretty rare happens every couple months.  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  Exam today is unremarkable    Summary    1.  Abnormal ECG-likely due to lead placement, repeat ECG today appears normal    2.  Cardiovascular risk factors including morbid obesity and HDL deficiency-recommend regular aerobic exercise for weight reduction and raising HDL    I am happy to see her back at any time for any of her future cardiovascular needs, please feel free to contact me with any questions you have regards to her care         Today's clinic visit entailed:  Review of external notes as documented elsewhere in note  Review of the result(s) of each unique test - ECG, lipids    Provider  Link to [facility] Help Grid     The level of medical decision making during this visit was of moderate complexity.    Orders  "Placed This Encounter   Procedures     EKG 12-lead complete w/read - Clinics (performed today)     No orders of the defined types were placed in this encounter.    There are no discontinued medications.      Encounter Diagnosis   Name Primary?     Abnormal electrocardiogram        CURRENT MEDICATIONS:  Current Outpatient Medications   Medication Sig Dispense Refill     Albuterol-Budesonide (AIRSUPRA) 90-80 MCG/ACT AERO Inhale 1 puff into the lungs 2 times daily. 10.7 g 11     azelaic acid (FINACIA) 15 % external gel Apply topically 2 times daily.       BP WASH 5 % external liquid Apply topically at bedtime.       cetirizine (ZYRTEC) 10 MG tablet Take 1 tablet (10 mg) by mouth daily. 30 tablet 0     fluticasone-salmeterol (ADVAIR) 100-50 MCG/ACT inhaler Inhale 1 puff into the lungs every 12 hours. 14 each 0     metroNIDAZOLE (METROGEL) 0.75 % external gel Apply topically 2 times daily.       spironolactone (ALDACTONE) 50 MG tablet Take 50 mg by mouth daily.       tretinoin (RETIN-A) 0.025 % external cream Apply topically at bedtime.       doxycycline monohydrate (MONODOX) 100 MG capsule Take 100 mg by mouth 2 times daily. (Patient not taking: Reported on 7/24/2025)         ALLERGIES     Allergies   Allergen Reactions     Soy Allergy (Obsolete) Itching and Swelling     Cats      Copper Other (See Comments)     \"breakouts\"     Dogs        PAST MEDICAL HISTORY:  Past Medical History:   Diagnosis Date     Abnormal uterine bleeding (AUB) 11/28/2019     Asthma      Enlarged tonsils      History of edema 10/23/2020     Leg fatigue 10/23/2020     Low ferritin 04/11/2019     Palpitations 04/11/2019     Subluxation of left patella, sequela 12/20/2019     Uterine polyp 11/28/2019       PAST SURGICAL HISTORY:  Past Surgical History:   Procedure Laterality Date     BIOPSY  2018     GYN SURGERY  2019    Vaginal polyop     US BREAST BIOPSY LT  2017    benign, still has a lump       FAMILY HISTORY:  Family History   Problem Relation " Age of Onset     Hypertension Mother      Diabetes Mother         type 2     Arthritis Mother      Bipolar Disorder Mother         per mom     Obesity Mother      Diabetes Maternal Grandmother         type 2     Cerebrovascular Disease Maternal Grandmother      Hypertension Maternal Grandmother      Cardiovascular Paternal Grandfather         Lung     Diabetes Paternal Grandfather              Bipolar Disorder Brother        SOCIAL HISTORY:  Social History     Socioeconomic History     Marital status: Single     Spouse name: None     Number of children: None     Years of education: None     Highest education level: None   Tobacco Use     Smoking status: Never     Passive exposure: Never     Smokeless tobacco: Never   Vaping Use     Vaping status: Never Used   Substance and Sexual Activity     Alcohol use: Yes     Comment: few times a month     Drug use: No     Sexual activity: Yes     Partners: Male     Birth control/protection: Pull-out method     Comment: Longterm boyfriend of 2 years   Other Topics Concern     Parent/sibling w/ CABG, MI or angioplasty before 65F 55M? No   Social History Narrative    2024: New job, new insurance, living currently with dad and stepmom in [place] currently does not have a car        2023: moved to [place], lives with boyfriend, quit working at [employer] after 5 years yesterday.  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  Mom lives in [place].  1 brother lives in [facility] and a sister lives in [place] and has 2 children ages 16 and 2 years of age.    Moved to [place] 3/2023 & living with boyfriend, mom lives in [place] & moms  passed away recently. She recently got a car & drove here today        2022: to work nights [employer] service, studying to be a nurse, to start work at [employer] from mental  health, lives with two roomamtes, back with boyfriend of 2 yrs        11/2020: working form home,         2019: lives with room mates, independantly, no pets         Care giver, sitting job, lives with family,      Social Drivers of Health     Financial Resource Strain: Unknown (1/29/2023)    Overall Financial Resource Strain (CARDIA)      Difficulty of Paying Living Expenses: Patient declined   Food Insecurity: Unknown (1/29/2023)    Hunger Vital Sign      Worried About Running Out of Food in the Last Year: Patient declined      Ran Out of Food in the Last Year: Patient declined   Transportation Needs: No Transportation Needs (1/29/2023)    PRAPARE - Transportation      Lack of Transportation (Medical): No      Lack of Transportation (Non-Medical): No   Physical Activity: Insufficiently Active (1/29/2023)    Exercise Vital Sign      Days of Exercise per Week: 1 day      Minutes of Exercise per Session: 100 min   Stress: Stress Concern Present (1/29/2023)    [place] [employer] - Occupational Stress Questionnaire      Feeling of Stress : Rather much    [facility]    Social Connections   Interpersonal Safety: Low Risk  (2/24/2025)    Interpersonal Safety      Do you feel physically and emotionally safe where you currently live?: Yes      Within the past 12 months, have you been hit, slapped, kicked or otherwise physically hurt by someone?: No      Within the past 12 months, have you been humiliated or emotionally abused in other ways by your partner or ex-partner?: No   Housing Stability: Low Risk  (1/29/2023)    Housing Stability Vital Sign      Unable to Pay for Housing in the Last Year: No      Number of Places Lived in the Last Year: 1      Unstable Housing in the Last Year: No       Review of Systems:  Skin:        Eyes:       ENT:       Respiratory:  Positive for wheezing  Cardiovascular:  syncope or near-syncope, chest pain, fatigue,  "lightheadedness, dizziness Positive for, edema  Gastroenterology:      Genitourinary:       Musculoskeletal:       Neurologic:       Psychiatric:       Heme/Lymph/Imm:       Endocrine:         Physical Exam:  Vitals: /70   Pulse 80   Ht 1.651 m (5' 5\")   Wt 123 kg (271 lb 3.2 oz)   LMP 05/23/2025 (Exact Date)   SpO2 96%   BMI 45.13 kg/m      Constitutional:  cooperative obese      Skin:  warm and dry to the touch          Head:  normocephalic        Eyes:  pupils equal and round        Lymph:      ENT:  dentition good        Neck:  no carotid bruit        Respiratory:  clear to auscultation, normal symmetry         Cardiac: regular rhythm, no murmurs, gallops or rubs detected                pulses full and equal                                        GI:  abdomen soft        Extremities and Muscular Skeletal:  no deformities, clubbing, cyanosis, erythema observed, no edema              Neurological:  no gross motor deficits, affect appropriate        Psych:  Alert and Oriented x 3        Recent Lab Results:  LIPID RESULTS:  Lab Results   Component Value Date    CHOL 122 04/09/2025    CHOL 106 11/12/2020    HDL 36 (L) 04/09/2025    HDL 31 (L) 11/12/2020    LDL 78 04/09/2025    LDL 70 11/12/2020    TRIG 40 04/09/2025    TRIG 25 11/12/2020       LIVER ENZYME RESULTS:  Lab Results   Component Value Date    AST 19 04/09/2025    AST 13 11/12/2020    ALT 11 04/09/2025    ALT 21 11/12/2020       CBC RESULTS:  Lab Results   Component Value Date    WBC 10.5 04/09/2025    WBC 7.9 11/12/2020    RBC 4.88 04/09/2025    RBC 4.87 11/12/2020    HGB 13.2 04/09/2025    HGB 13.0 11/12/2020    HCT 40.5 04/09/2025    HCT 39.8 11/12/2020    MCV 83 04/09/2025    MCV 82 11/12/2020    MCH 27.0 04/09/2025    MCH 26.7 11/12/2020    MCHC 32.6 04/09/2025    MCHC 32.7 11/12/2020    RDW 14.8 04/09/2025    RDW 14.4 11/12/2020     04/09/2025     11/12/2020       BMP RESULTS:  Lab Results   Component Value Date     " 04/09/2025     11/12/2020    POTASSIUM 4.3 04/09/2025    POTASSIUM 3.6 05/30/2022    POTASSIUM 3.9 11/12/2020    CHLORIDE 105 04/09/2025    CHLORIDE 109 05/30/2022    CHLORIDE 108 11/12/2020    CO2 23 04/09/2025    CO2 22 05/30/2022    CO2 23 11/12/2020    ANIONGAP 11 04/09/2025    ANIONGAP 8 05/30/2022    ANIONGAP 6 11/12/2020    GLC 83 04/09/2025     (H) 05/30/2022    GLC 89 11/12/2020    BUN 16.7 04/09/2025    BUN 17 05/30/2022    BUN 14 11/12/2020    CR 0.83 04/09/2025    CR 0.73 11/12/2020    GFRESTIMATED >90 04/09/2025    GFRESTIMATED >90 11/12/2020    GFRESTBLACK >90 11/12/2020    RODNEY 9.5 04/09/2025    RODNEY 8.9 11/12/2020        A1C RESULTS:  Lab Results   Component Value Date    A1C 5.4 04/09/2025    A1C 5.3 03/08/2019       INR RESULTS:  Lab Results   Component Value Date    INR 1.0 12/10/2010           CC  Stephen Bowen PA-C  [address],  [place] [address]                  Thank you for allowing me to participate in the care of your patient.      Sincerely,     Nina Posey DO     [facility]  cc:   Stephen Bowen PA-C  [address],  [place] [address]

## 2025-09-02 ENCOUNTER — VIRTUAL VISIT (OUTPATIENT)
Dept: FAMILY MEDICINE | Facility: CLINIC | Age: 35
End: 2025-09-02
Payer: COMMERCIAL

## 2025-09-02 DIAGNOSIS — F33.1 MODERATE EPISODE OF RECURRENT MAJOR DEPRESSIVE DISORDER (H): Primary | ICD-10-CM

## 2025-09-02 PROCEDURE — 98006 SYNCH AUDIO-VIDEO EST MOD 30: CPT

## 2025-09-02 RX ORDER — CLINDAMYCIN PHOSPHATE 10 UG/ML
LOTION TOPICAL
COMMUNITY
Start: 2025-05-14

## 2025-09-02 ASSESSMENT — ANXIETY QUESTIONNAIRES
5. BEING SO RESTLESS THAT IT IS HARD TO SIT STILL: MORE THAN HALF THE DAYS
IF YOU CHECKED OFF ANY PROBLEMS ON THIS QUESTIONNAIRE, HOW DIFFICULT HAVE THESE PROBLEMS MADE IT FOR YOU TO DO YOUR WORK, TAKE CARE OF THINGS AT HOME, OR GET ALONG WITH OTHER PEOPLE: SOMEWHAT DIFFICULT
2. NOT BEING ABLE TO STOP OR CONTROL WORRYING: MORE THAN HALF THE DAYS
IF YOU CHECKED OFF ANY PROBLEMS ON THIS QUESTIONNAIRE, HOW DIFFICULT HAVE THESE PROBLEMS MADE IT FOR YOU TO DO YOUR WORK, TAKE CARE OF THINGS AT HOME, OR GET ALONG WITH OTHER PEOPLE: SOMEWHAT DIFFICULT
GAD7 TOTAL SCORE: 15
GAD7 TOTAL SCORE: 15
7. FEELING AFRAID AS IF SOMETHING AWFUL MIGHT HAPPEN: MORE THAN HALF THE DAYS
2. NOT BEING ABLE TO STOP OR CONTROL WORRYING: MORE THAN HALF THE DAYS
5. BEING SO RESTLESS THAT IT IS HARD TO SIT STILL: MORE THAN HALF THE DAYS
3. WORRYING TOO MUCH ABOUT DIFFERENT THINGS: NEARLY EVERY DAY
GAD7 TOTAL SCORE: 15
8. IF YOU CHECKED OFF ANY PROBLEMS, HOW DIFFICULT HAVE THESE MADE IT FOR YOU TO DO YOUR WORK, TAKE CARE OF THINGS AT HOME, OR GET ALONG WITH OTHER PEOPLE?: SOMEWHAT DIFFICULT
4. TROUBLE RELAXING: MORE THAN HALF THE DAYS
GAD7 TOTAL SCORE: 15
7. FEELING AFRAID AS IF SOMETHING AWFUL MIGHT HAPPEN: MORE THAN HALF THE DAYS
7. FEELING AFRAID AS IF SOMETHING AWFUL MIGHT HAPPEN: MORE THAN HALF THE DAYS
6. BECOMING EASILY ANNOYED OR IRRITABLE: MORE THAN HALF THE DAYS
6. BECOMING EASILY ANNOYED OR IRRITABLE: SEVERAL DAYS
3. WORRYING TOO MUCH ABOUT DIFFERENT THINGS: NEARLY EVERY DAY
1. FEELING NERVOUS, ANXIOUS, OR ON EDGE: NEARLY EVERY DAY
1. FEELING NERVOUS, ANXIOUS, OR ON EDGE: NEARLY EVERY DAY

## 2025-09-02 ASSESSMENT — PATIENT HEALTH QUESTIONNAIRE - PHQ9
SUM OF ALL RESPONSES TO PHQ QUESTIONS 1-9: 14
5. POOR APPETITE OR OVEREATING: SEVERAL DAYS
10. IF YOU CHECKED OFF ANY PROBLEMS, HOW DIFFICULT HAVE THESE PROBLEMS MADE IT FOR YOU TO DO YOUR WORK, TAKE CARE OF THINGS AT HOME, OR GET ALONG WITH OTHER PEOPLE: SOMEWHAT DIFFICULT
SUM OF ALL RESPONSES TO PHQ QUESTIONS 1-9: 14